# Patient Record
Sex: FEMALE | Race: WHITE | NOT HISPANIC OR LATINO | Employment: FULL TIME | URBAN - METROPOLITAN AREA
[De-identification: names, ages, dates, MRNs, and addresses within clinical notes are randomized per-mention and may not be internally consistent; named-entity substitution may affect disease eponyms.]

---

## 2017-01-26 ENCOUNTER — TELEPHONE (OUTPATIENT)
Dept: UROLOGY | Facility: CLINIC | Age: 37
End: 2017-01-26

## 2017-01-26 NOTE — TELEPHONE ENCOUNTER
Patient states symptoms started approximately two days ago. States she just finished a round of Bactrim DS.    Appointment given for 1.27.17 at 9:30am.

## 2017-01-26 NOTE — TELEPHONE ENCOUNTER
----- Message from Naila Valentino sent at 1/26/2017 11:43 AM CST -----  Contact: Self [phone]  Patient is calling to see if she can see doctor today she is having blood in urine and pain. Please advice

## 2017-01-27 ENCOUNTER — OFFICE VISIT (OUTPATIENT)
Dept: UROLOGY | Facility: CLINIC | Age: 37
End: 2017-01-27
Payer: COMMERCIAL

## 2017-01-27 VITALS
SYSTOLIC BLOOD PRESSURE: 115 MMHG | HEART RATE: 101 BPM | BODY MASS INDEX: 37.56 KG/M2 | DIASTOLIC BLOOD PRESSURE: 74 MMHG | WEIGHT: 220 LBS | TEMPERATURE: 98 F | OXYGEN SATURATION: 99 % | HEIGHT: 64 IN

## 2017-01-27 DIAGNOSIS — R35.0 URINARY FREQUENCY: ICD-10-CM

## 2017-01-27 DIAGNOSIS — N13.1 HYDRONEPHROSIS CONCURRENT WITH AND DUE TO URETERAL STRICTURE: ICD-10-CM

## 2017-01-27 DIAGNOSIS — N30.01 ACUTE CYSTITIS WITH HEMATURIA: ICD-10-CM

## 2017-01-27 DIAGNOSIS — R30.0 DYSURIA: ICD-10-CM

## 2017-01-27 DIAGNOSIS — N30.10 INTERSTITIAL CYSTITIS: ICD-10-CM

## 2017-01-27 DIAGNOSIS — R10.9 FLANK PAIN: Primary | ICD-10-CM

## 2017-01-27 DIAGNOSIS — N13.5 URETERAL STRICTURE, LEFT: ICD-10-CM

## 2017-01-27 LAB
BACTERIA #/AREA URNS AUTO: ABNORMAL /HPF
BILIRUB UR QL STRIP: NEGATIVE
CLARITY UR REFRACT.AUTO: ABNORMAL
COLOR UR AUTO: ABNORMAL
GLUCOSE UR QL STRIP: NEGATIVE
HGB UR QL STRIP: ABNORMAL
HYALINE CASTS UR QL AUTO: 0 /LPF
KETONES UR QL STRIP: NEGATIVE
LEUKOCYTE ESTERASE UR QL STRIP: ABNORMAL
MICROSCOPIC COMMENT: ABNORMAL
NITRITE UR QL STRIP: NEGATIVE
NON-SQ EPI CELLS #/AREA URNS AUTO: 2 /HPF
PH UR STRIP: 7 [PH] (ref 5–8)
PROT UR QL STRIP: ABNORMAL
RBC #/AREA URNS AUTO: >100 /HPF (ref 0–4)
SP GR UR STRIP: 1.01 (ref 1–1.03)
SQUAMOUS #/AREA URNS AUTO: 8 /HPF
URN SPEC COLLECT METH UR: ABNORMAL
UROBILINOGEN UR STRIP-ACNC: NEGATIVE EU/DL
WBC #/AREA URNS AUTO: >100 /HPF (ref 0–5)
WBC CLUMPS UR QL AUTO: ABNORMAL

## 2017-01-27 PROCEDURE — 87086 URINE CULTURE/COLONY COUNT: CPT

## 2017-01-27 PROCEDURE — 99214 OFFICE O/P EST MOD 30 MIN: CPT | Mod: S$GLB,,, | Performed by: UROLOGY

## 2017-01-27 PROCEDURE — 81001 URINALYSIS AUTO W/SCOPE: CPT

## 2017-01-27 PROCEDURE — 1159F MED LIST DOCD IN RCRD: CPT | Mod: S$GLB,,, | Performed by: UROLOGY

## 2017-01-27 PROCEDURE — 99999 PR PBB SHADOW E&M-EST. PATIENT-LVL IV: CPT | Mod: PBBFAC,,, | Performed by: UROLOGY

## 2017-01-27 RX ORDER — OXYCODONE AND ACETAMINOPHEN 10; 325 MG/1; MG/1
1 TABLET ORAL EVERY 4 HOURS PRN
Qty: 50 TABLET | Refills: 0 | Status: SHIPPED | OUTPATIENT
Start: 2017-01-27 | End: 2017-02-17 | Stop reason: SDUPTHER

## 2017-01-27 RX ORDER — SULFAMETHOXAZOLE AND TRIMETHOPRIM 800; 160 MG/1; MG/1
1 TABLET ORAL 2 TIMES DAILY
Qty: 28 TABLET | Refills: 1 | Status: SHIPPED | OUTPATIENT
Start: 2017-01-27 | End: 2017-02-10

## 2017-01-27 NOTE — PROGRESS NOTES
Subjective:       Patient ID: Lesa Leyva is a 36 y.o. female.    Chief Complaint: Hematuria    HPI Comments: 37 yo WF with history of L ureteral stricture at level of pelvic brim from prior kidney stone requiring long term stenting.    Hematuria   This is a new problem. The current episode started 1 to 4 weeks ago. The problem has been gradually improving since onset. She describes the hematuria as gross hematuria. The hematuria occurs during the initial portion of her urinary stream. She reports clotting at the beginning of her urine stream. Her pain is at a severity of 7/10. The pain is moderate. She describes her urine color as dark red. Irritative symptoms include frequency (x10-12), nocturia (x3) and urgency. Associated symptoms include abdominal pain (Bladder pain / Spasm), chills, dysuria, flank pain (L>R), hesitancy and nausea. Pertinent negatives include no facial swelling, fever, genital pain, inability to urinate, vomiting or sore throat. She is sexually active. Her past medical history is significant for kidney stones and UTI. There is no history of hypertension,  trauma, recent infection, sickle cell disease, STDs or tobacco use.   Flank Pain   Associated symptoms include abdominal pain (Bladder pain / Spasm) and dysuria. Pertinent negatives include no chest pain, fever, headaches, pelvic pain or weakness.     Review of Systems   Constitutional: Positive for chills. Negative for activity change, appetite change, fatigue and fever.   HENT: Negative for congestion, ear pain, facial swelling, hearing loss, nosebleeds, sinus pressure, sore throat and trouble swallowing.    Eyes: Negative for pain and visual disturbance.   Respiratory: Negative for apnea, cough and shortness of breath.    Cardiovascular: Negative for chest pain and leg swelling.   Gastrointestinal: Positive for abdominal pain (Bladder pain / Spasm) and nausea. Negative for abdominal distention, anal bleeding, blood in stool,  constipation, diarrhea, rectal pain and vomiting.   Endocrine: Negative for cold intolerance, heat intolerance, polydipsia, polyphagia and polyuria.   Genitourinary: Positive for dysuria, flank pain (L>R), frequency (x10-12), hematuria, hesitancy, nocturia (x3) and urgency. Negative for decreased urine volume, difficulty urinating, dyspareunia, enuresis, genital sores, menstrual problem, pelvic pain, vaginal bleeding, vaginal discharge and vaginal pain.   Musculoskeletal: Negative for arthralgias and back pain.   Skin: Negative for color change, pallor and rash.   Allergic/Immunologic: Negative for environmental allergies, food allergies and immunocompromised state.   Neurological: Negative for dizziness, speech difficulty, weakness and headaches.   Hematological: Negative for adenopathy. Does not bruise/bleed easily.   Psychiatric/Behavioral: Negative.        Objective:      Physical Exam   Nursing note and vitals reviewed.  Constitutional: She is oriented to person, place, and time. She appears well-developed and well-nourished.   HENT:   Head: Normocephalic.   Nose: Nose normal.   Mouth/Throat: Oropharynx is clear and moist.   Eyes: Conjunctivae and EOM are normal. Pupils are equal, round, and reactive to light.   Neck: Normal range of motion. Neck supple.   Cardiovascular: Normal rate, regular rhythm, normal heart sounds and intact distal pulses.    Pulmonary/Chest: Effort normal and breath sounds normal.   Abdominal: Soft. Bowel sounds are normal.   Genitourinary: Vagina normal.   Musculoskeletal: Normal range of motion.   Neurological: She is alert and oriented to person, place, and time. She has normal reflexes.   Skin: Skin is warm and dry.     Psychiatric: She has a normal mood and affect. Her behavior is normal. Judgment and thought content normal.       Assessment:       1. Flank pain    2. Urinary frequency    3. Ureteral stricture, left    4. Interstitial cystitis    5. Acute cystitis with hematuria     6. Hydronephrosis concurrent with and due to ureteral stricture        Plan:       Patient Instructions   Bactrim DS  Twice daily for two weeks  U/A and Urine C+S  F/U 1 month

## 2017-01-27 NOTE — MR AVS SNAPSHOT
[facility]  [address] [place] [address] [address]  [place] [address]  Phone: [phone]  Fax: [phone]                  Lesa Leyva   2017 9:30 AM   Office Visit    Description:  Female : 1980   Provider:  Angel Hernandez MD   Department:  [facility]           Reason for Visit     Hematuria           Diagnoses this Visit        Comments    Flank pain    -  Primary     Urinary frequency         Ureteral stricture, left         Interstitial cystitis         Acute cystitis with hematuria         Hydronephrosis concurrent with and due to ureteral stricture         Dysuria                To Do List           Goals (5 Years of Data)     None      Follow-Up and Disposition     Return in about 4 weeks (around 2017).    Follow-up and Disposition History       These Medications        Disp Refills Start End    sulfamethoxazole-trimethoprim 800-160mg (BACTRIM DS) 800-160 mg Tab 28 tablet 1 2017 2/10/2017    Take 1 tablet by mouth 2 (two) times daily. - Oral    Pharmacy: [facility]/pharmacy #[id number] - [place], [place] - [address] [facility] Ph #: [phone]       oxycodone-acetaminophen (PERCOCET)  mg per tablet 50 tablet 0 2017     Take 1 tablet by mouth every 4 (four) hours as needed for Pain. - Oral    Pharmacy: [facility]/pharmacy #[id number] - [place], [place] - [address] [facility] Ph #: [phone]         [facility] On Call     [facility] On Call Nurse Care Line -  Assistance  Registered nurses in the [facility] provide clinical advisement, health education, appointment booking, and other advisory services.  Call for this free service at [phone].             Medications           Message regarding Medications     Verify the changes and/or additions to your medication regime listed below are the same as discussed with your clinician today.  If any of these changes or additions are incorrect, please notify your healthcare provider.        START taking these NEW  "medications        Refills    sulfamethoxazole-trimethoprim 800-160mg (BACTRIM DS) 800-160 mg Tab 1    Sig: Take 1 tablet by mouth 2 (two) times daily.    Class: Normal    Route: Oral           Verify that the below list of medications is an accurate representation of the medications you are currently taking.  If none reported, the list may be blank. If incorrect, please contact your healthcare provider. Carry this list with you in case of emergency.           Current Medications     amitriptyline (ELAVIL) 10 MG tablet Take 1 tablet by mouth every evening.    CELEBREX 200 mg capsule Take 1 capsule by mouth once daily.    duloxetine (CYMBALTA) 60 MG capsule Take 60 mg by mouth once daily.    hydrocodone-acetaminophen 10-325mg (NORCO)  mg Tab Take 1 tablet by mouth every 6 (six) hours as needed.    LYRICA 200 mg Cap Take 200 mg by mouth once daily.    ondansetron (ZOFRAN-ODT) 4 MG TbDL Take 1 tablet (4 mg total) by mouth every 6 (six) hours as needed (nausea or vomiting).    oxycodone-acetaminophen (PERCOCET)  mg per tablet Take 1 tablet by mouth every 4 (four) hours as needed for Pain.    URIBEL 118-10-40.8-36 mg Cap Take 1 capsule by mouth 4 (four) times daily as needed.    UROGESIC-BLUE 81.6-40.8-0.12 mg Tab Take 1 tablet by mouth every 6 (six) hours as needed.    hyoscyamine (LEVSIN/SL) 0.125 mg Subl Place 1 tablet (0.125 mg total) under the tongue every 6 (six) hours as needed (Bladder Spasm).    sulfamethoxazole-trimethoprim 800-160mg (BACTRIM DS) 800-160 mg Tab Take 1 tablet by mouth 2 (two) times daily.           Clinical Reference Information           Vital Signs - Last Recorded  Most recent update: 1/27/2017  9:55 AM by Lamberto Gonzalez MA    BP Pulse Temp Ht Wt LMP    115/74 101 98.3 °F (36.8 °C) 5' 4" (1.626 m) 99.8 kg (220 lb) 05/08/2015    SpO2 BMI             99% 37.76 kg/m2         Blood Pressure          Most Recent Value    BP  115/74      Allergies as of 1/27/2017     No Known Allergies "      Immunizations Administered on Date of Encounter - 1/27/2017     None      Orders Placed During Today's Visit      Normal Orders This Visit    Urinalysis     Urinalysis     Urine culture     Future Labs/Procedures Expected by Expires    Urine culture  1/27/2017 3/28/2018    Urinalysis  As directed 5/29/2017      [facility] Sign-Up     Activating your [facility] account is as easy as 1-2-3!     1) Visit [email or web address], select Sign Up Now, enter this activation code and your date of birth, then select Next.  [email or web address]  Expires: 3/13/2017 10:47 AM      2) Create a username and password to use when you visit [facility] in the future and select a security question in case you lose your password and select Next.    3) Enter your e-mail address and click Sign Up!    Additional Information  If you have questions, please e-mail [email or web address] or call [phone] to talk to our [facility] staff. Remember, [facility] is NOT to be used for urgent needs. For medical emergencies, dial 911.         Instructions    Bactrim DS  Twice daily for two weeks  U/A and Urine C+S  F/U 1 month

## 2017-01-28 LAB — BACTERIA UR CULT: NORMAL

## 2017-01-30 ENCOUNTER — TELEPHONE (OUTPATIENT)
Dept: UROLOGY | Facility: CLINIC | Age: 37
End: 2017-01-30

## 2017-01-30 NOTE — TELEPHONE ENCOUNTER
----- Message from Angel Hernandez MD sent at 1/30/2017  8:59 AM CST -----  Urine culture is negative, finish medicines.

## 2017-02-17 RX ORDER — OXYCODONE AND ACETAMINOPHEN 10; 325 MG/1; MG/1
1 TABLET ORAL EVERY 4 HOURS PRN
Qty: 50 TABLET | Refills: 0 | Status: SHIPPED | OUTPATIENT
Start: 2017-02-17 | End: 2017-03-17 | Stop reason: SDUPTHER

## 2017-02-17 NOTE — TELEPHONE ENCOUNTER
Patient's mother called requesting a refill for her Percocet. Last filled on 1.27.17, 50 dispensed, with no refills.

## 2017-02-21 ENCOUNTER — OFFICE VISIT (OUTPATIENT)
Dept: UROLOGY | Facility: CLINIC | Age: 37
End: 2017-02-21
Payer: COMMERCIAL

## 2017-02-21 VITALS
HEIGHT: 64 IN | TEMPERATURE: 98 F | WEIGHT: 228.5 LBS | SYSTOLIC BLOOD PRESSURE: 110 MMHG | RESPIRATION RATE: 20 BRPM | BODY MASS INDEX: 39.01 KG/M2 | DIASTOLIC BLOOD PRESSURE: 72 MMHG | HEART RATE: 88 BPM

## 2017-02-21 DIAGNOSIS — R10.9 FLANK PAIN: ICD-10-CM

## 2017-02-21 DIAGNOSIS — R31.9 HEMATURIA: ICD-10-CM

## 2017-02-21 DIAGNOSIS — R10.2 PELVIC PAIN IN FEMALE: ICD-10-CM

## 2017-02-21 DIAGNOSIS — R30.1 PAINFUL BLADDER SPASM: Primary | ICD-10-CM

## 2017-02-21 LAB
BACTERIA #/AREA URNS AUTO: ABNORMAL /HPF
BILIRUB UR QL STRIP: NEGATIVE
CLARITY UR REFRACT.AUTO: ABNORMAL
COLOR UR AUTO: ABNORMAL
GLUCOSE UR QL STRIP: NEGATIVE
HGB UR QL STRIP: ABNORMAL
HYALINE CASTS UR QL AUTO: 0 /LPF
KETONES UR QL STRIP: NEGATIVE
LEUKOCYTE ESTERASE UR QL STRIP: ABNORMAL
MICROSCOPIC COMMENT: ABNORMAL
NITRITE UR QL STRIP: NEGATIVE
PH UR STRIP: 6 [PH] (ref 5–8)
PROT UR QL STRIP: ABNORMAL
RBC #/AREA URNS AUTO: 28 /HPF (ref 0–4)
SP GR UR STRIP: 1.01 (ref 1–1.03)
SQUAMOUS #/AREA URNS AUTO: 1 /HPF
URN SPEC COLLECT METH UR: ABNORMAL
UROBILINOGEN UR STRIP-ACNC: NEGATIVE EU/DL
WBC #/AREA URNS AUTO: >100 /HPF (ref 0–5)
WBC CLUMPS UR QL AUTO: ABNORMAL

## 2017-02-21 PROCEDURE — 1160F RVW MEDS BY RX/DR IN RCRD: CPT | Mod: S$GLB,,, | Performed by: UROLOGY

## 2017-02-21 PROCEDURE — 87086 URINE CULTURE/COLONY COUNT: CPT

## 2017-02-21 PROCEDURE — 81001 URINALYSIS AUTO W/SCOPE: CPT

## 2017-02-21 PROCEDURE — 99999 PR PBB SHADOW E&M-EST. PATIENT-LVL III: CPT | Mod: PBBFAC,,, | Performed by: UROLOGY

## 2017-02-21 PROCEDURE — 99214 OFFICE O/P EST MOD 30 MIN: CPT | Mod: S$GLB,,, | Performed by: UROLOGY

## 2017-02-21 RX ORDER — SULFAMETHOXAZOLE AND TRIMETHOPRIM 800; 160 MG/1; MG/1
1 TABLET ORAL 2 TIMES DAILY
Qty: 28 TABLET | Refills: 1 | Status: SHIPPED | OUTPATIENT
Start: 2017-02-21 | End: 2017-03-21

## 2017-02-21 RX ORDER — HYOSCYAMINE SULFATE 0.12 MG/1
0.12 TABLET SUBLINGUAL EVERY 6 HOURS PRN
Qty: 120 TABLET | Refills: 11 | Status: SHIPPED | OUTPATIENT
Start: 2017-02-21 | End: 2020-01-15

## 2017-02-21 NOTE — MR AVS SNAPSHOT
[facility]  [address] [place] [address] [address]  [place] [address]  Phone: [phone]  Fax: [phone]                  Lesa Leyva   2017 1:00 PM   Office Visit    Description:  Female : 1980   Provider:  Angel Hernandez MD   Department:  [facility]           Reason for Visit     Follow-up           Diagnoses this Visit        Comments    Painful bladder spasm    -  Primary     Pelvic pain in female         Flank pain         Hematuria                To Do List           Goals (5 Years of Data)     None      Follow-Up and Disposition     Return in about 6 weeks (around 2017).    Follow-up and Disposition History       These Medications        Disp Refills Start End    sulfamethoxazole-trimethoprim 800-160mg (BACTRIM DS) 800-160 mg Tab 28 tablet 1 2017 3/21/2017    Take 1 tablet by mouth 2 (two) times daily. - Oral    Pharmacy: [facility]/pharmacy #[id number] - [place] - [address] [facility] Ph #: [phone]       hyoscyamine (LEVSIN/SL) 0.125 mg Subl 120 tablet 11 2017 3/23/2017    Place 1 tablet (0.125 mg total) under the tongue every 6 (six) hours as needed (Bladder Spasm). - Sublingual    Pharmacy: [facility]/pharmacy #[id number] - [place] - [address] [facility] Ph #: [phone]         [facility] On Call     [facility] On Call Nurse Care Line -  Assistance  Registered nurses in the [facility] provide clinical advisement, health education, appointment booking, and other advisory services.  Call for this free service at [phone].             Medications           Message regarding Medications     Verify the changes and/or additions to your medication regime listed below are the same as discussed with your clinician today.  If any of these changes or additions are incorrect, please notify your healthcare provider.        START taking these NEW medications        Refills    sulfamethoxazole-trimethoprim 800-160mg (BACTRIM DS) 800-160 mg Tab 1    Sig:  "Take 1 tablet by mouth 2 (two) times daily.    Class: Normal    Route: Oral      STOP taking these medications     ondansetron (ZOFRAN-ODT) 4 MG TbDL Take 1 tablet (4 mg total) by mouth every 6 (six) hours as needed (nausea or vomiting).    hydrocodone-acetaminophen 10-325mg (NORCO)  mg Tab Take 1 tablet by mouth every 6 (six) hours as needed.           Verify that the below list of medications is an accurate representation of the medications you are currently taking.  If none reported, the list may be blank. If incorrect, please contact your healthcare provider. Carry this list with you in case of emergency.           Current Medications     amitriptyline (ELAVIL) 10 MG tablet Take 1 tablet by mouth every evening.    CELEBREX 200 mg capsule Take 1 capsule by mouth once daily.    duloxetine (CYMBALTA) 60 MG capsule Take 60 mg by mouth once daily.    LYRICA 200 mg Cap Take 200 mg by mouth once daily.    oxycodone-acetaminophen (PERCOCET)  mg per tablet Take 1 tablet by mouth every 4 (four) hours as needed for Pain.    URIBEL 118-10-40.8-36 mg Cap Take 1 capsule by mouth 4 (four) times daily as needed.    hyoscyamine (LEVSIN/SL) 0.125 mg Subl Place 1 tablet (0.125 mg total) under the tongue every 6 (six) hours as needed (Bladder Spasm).    sulfamethoxazole-trimethoprim 800-160mg (BACTRIM DS) 800-160 mg Tab Take 1 tablet by mouth 2 (two) times daily.    UROGESIC-BLUE 81.6-40.8-0.12 mg Tab Take 1 tablet by mouth every 6 (six) hours as needed.           Clinical Reference Information           Your Vitals Were     BP Pulse Temp Resp Height Weight    110/72 88 98.3 °F (36.8 °C) 20 5' 4" (1.626 m) 103.7 kg (228 lb 8.1 oz)    Last Period BMI             05/08/2015 39.22 kg/m2         Blood Pressure          Most Recent Value    BP  110/72      Allergies as of 2/21/2017     No Known Allergies      Immunizations Administered on Date of Encounter - 2/21/2017     None      Orders Placed During Today's Visit      " Normal Orders This Visit    Urinalysis     Urine culture     X-Ray Abdomen AP 1 View     Future Labs/Procedures Expected by Expires    Urine culture  2/21/2017 4/22/2018    X-Ray Abdomen AP 1 View  2/21/2017 2/22/2018    Urinalysis  As directed 6/23/2017      [facility] Sign-Up     Activating your [facility] account is as easy as 1-2-3!     1) Visit [email or web address], select Sign Up Now, enter this activation code and your date of birth, then select Next.  [email or web address]  Expires: 3/13/2017 10:47 AM      2) Create a username and password to use when you visit [facility] in the future and select a security question in case you lose your password and select Next.    3) Enter your e-mail address and click Sign Up!    Additional Information  If you have questions, please e-mail [email or web address] or call [phone] to talk to our [facility] staff. Remember, [facility] is NOT to be used for urgent needs. For medical emergencies, dial 911.         Instructions    U/A, U C+S.  Bactrim DS twice daily for 4 weeks.  F/U 6 wks  Continue Levsin and Uribel.         Language Assistance Services     ATTENTION: Language assistance services are available, free of charge. Please call [phone].      ATENCIÓN: Si habla español, tiene a miramontes disposición servicios gratuitos de asistencia lingüística. Llame al [phone].     CHÚ Ý: N?u b?n nói Ti?ng Vi?t, có các d?ch v? h? tr? ngôn ng? mi?n phí dành cho b?n. G?i s? [phone].         [facility] complies with applicable Federal civil rights laws and does not discriminate on the basis of race, color, national origin, age, disability, or sex.

## 2017-02-21 NOTE — PROGRESS NOTES
Subjective:       Patient ID: Lesa Leyva is a 37 y.o. female.    Chief Complaint: Follow-up (Flank pain)    HPI Comments: 36 yo WF with left ureteral stricture. Now with bladder spasms and bilateral CVAT L>R.  Last U C+S was negative.  Gross hematuria 1-2 x daily.      Flank Pain   This is a new problem. The current episode started 1 to 4 weeks ago. The problem occurs constantly. The problem has been gradually worsening since onset. The pain is present in the costovertebral angle (Bilateral L>R). The quality of the pain is described as stabbing. The pain does not radiate. The pain is at a severity of 6/10. The pain is moderate. The pain is worse during the day. The symptoms are aggravated by urinating, bending, lying down and position. Associated symptoms include dysuria and pelvic pain. Pertinent negatives include no abdominal pain, bladder incontinence, bowel incontinence, chest pain, fever, headaches, leg pain, numbness, paresis, paresthesias, perianal numbness, tingling, weakness or weight loss. Risk factors include lack of exercise and sedentary lifestyle. She has tried analgesics and heat (Anibiotics, warm baths, hydration) for the symptoms. The treatment provided mild relief.   Pelvic Pain   The patient's primary symptoms include pelvic pain. The patient's pertinent negatives include no vaginal discharge. This is a recurrent problem. The current episode started 1 to 4 weeks ago. The problem occurs constantly. The problem has been gradually worsening. The pain is moderate. Affected Side: Central. She is not pregnant. Associated symptoms include back pain, discolored urine, dysuria, flank pain, frequency, hematuria and urgency. Pertinent negatives include no abdominal pain, anorexia, chills, constipation, diarrhea, fever, headaches, joint pain, joint swelling, nausea, painful intercourse, rash, sore throat or vomiting. The symptoms are aggravated by tactile pressure, activity and urinating. She has tried  antibiotics, oral narcotics and warm baths for the symptoms. The treatment provided mild relief. She is sexually active. No, her partner does not have an STD. She uses hysterectomy for contraception. Her past medical history is significant for a gynecological surgery. There is no history of an abdominal surgery, a  section, an ectopic pregnancy, endometriosis, herpes simplex, menorrhagia, metrorrhagia, miscarriage, ovarian cysts, perineal abscess, PID, an STD, a terminated pregnancy or vaginosis.   Urinary Frequency    This is a chronic problem. The current episode started 1 to 4 weeks ago. The problem occurs every urination. The problem has been waxing and waning. The quality of the pain is described as burning. The pain is at a severity of 9/10. The pain is severe. There has been no fever. She is sexually active. There is no history of pyelonephritis. Associated symptoms include flank pain, frequency, hematuria, hesitancy (Anxiety over voiding due to expected discomfort.), sweats and urgency. Pertinent negatives include no behavior changes, chills, discharge, nausea, possible pregnancy, vomiting, weight loss, bubble bath use, constipation, rash or withholding. She has tried antibiotics for the symptoms. The treatment provided moderate relief. Her past medical history is significant for recurrent UTIs and a urological procedure. There is no history of catheterization, diabetes insipidus, diabetes mellitus, genitourinary reflux, hypertension, kidney stones, a single kidney, STD or urinary stasis.     Review of Systems   Constitutional: Negative for activity change, appetite change, chills, fatigue, fever and weight loss.   HENT: Negative for congestion, ear pain, hearing loss, nosebleeds, sinus pressure, sore throat and trouble swallowing.    Eyes: Negative for pain and visual disturbance.   Respiratory: Negative for apnea, cough and shortness of breath.    Cardiovascular: Negative for chest pain and leg  swelling.   Gastrointestinal: Negative for abdominal distention, abdominal pain, anal bleeding, anorexia, blood in stool, bowel incontinence, constipation, diarrhea, nausea, rectal pain and vomiting.   Endocrine: Negative for cold intolerance, heat intolerance, polydipsia, polyphagia and polyuria.   Genitourinary: Positive for dysuria, flank pain, frequency, hematuria, hesitancy (Anxiety over voiding due to expected discomfort.), pelvic pain and urgency. Negative for bladder incontinence, decreased urine volume, difficulty urinating, dyspareunia, enuresis, genital sores, menorrhagia, menstrual problem, vaginal bleeding, vaginal discharge and vaginal pain.   Musculoskeletal: Positive for back pain. Negative for arthralgias and joint pain.   Skin: Negative for color change, pallor and rash.   Allergic/Immunologic: Negative for environmental allergies, food allergies and immunocompromised state.   Neurological: Negative for dizziness, tingling, speech difficulty, weakness, numbness, headaches and paresthesias.   Hematological: Negative for adenopathy. Does not bruise/bleed easily.   Psychiatric/Behavioral: Negative.        Objective:      Physical Exam   Nursing note and vitals reviewed.  Constitutional: She is oriented to person, place, and time. She appears well-developed and well-nourished.   HENT:   Head: Normocephalic.   Nose: Nose normal.   Mouth/Throat: Oropharynx is clear and moist.   Eyes: Conjunctivae and EOM are normal. Pupils are equal, round, and reactive to light.   Neck: Normal range of motion. Neck supple.   Cardiovascular: Normal rate, regular rhythm, normal heart sounds and intact distal pulses.    Pulmonary/Chest: Effort normal and breath sounds normal.   Abdominal: Soft. Bowel sounds are normal.   Genitourinary:   Genitourinary Comments: Bilateral CVAT Left>Right, Supra-pubic,Bladder tenderness.   Musculoskeletal: Normal range of motion.   Neurological: She is alert and oriented to person, place,  and time. She has normal reflexes.   Skin: Skin is warm and dry.     Psychiatric: She has a normal mood and affect. Her behavior is normal. Judgment and thought content normal.       Assessment:       1. Painful bladder spasm    2. Pelvic pain in female    3. Flank pain    4. Hematuria        Plan:       Patient Instructions   U/A, U C+S.  Bactrim DS twice daily for 4 weeks.  F/U 6 wks  Continue Levsin and Uribel.

## 2017-02-23 ENCOUNTER — TELEPHONE (OUTPATIENT)
Dept: UROLOGY | Facility: CLINIC | Age: 37
End: 2017-02-23

## 2017-02-23 LAB — BACTERIA UR CULT: NO GROWTH

## 2017-02-23 NOTE — TELEPHONE ENCOUNTER
----- Message from Naila Valentino sent at 2/23/2017  3:18 PM CST -----  Contact: Self [phone]  Patient is calling to talk to nurse concerning personal questions. Please advice

## 2017-02-23 NOTE — TELEPHONE ENCOUNTER
Dr. Hernandez,    Patient has gone over her alotted 10 day sick leave. She works in [employer]. Needs a letter to state length of care, reason for care, that has had a stent in place for last three years, that has a history of kidney stones and IC.  Patient would like this faxed to [phone]    Thanks,   Karrie

## 2017-02-24 NOTE — TELEPHONE ENCOUNTER
----- Message from Angel Hernandez MD sent at 2/23/2017  1:23 PM CST -----  Notify pt that urine shows possible UTI. Await C+S. Continue antibiotics.

## 2017-03-03 NOTE — TELEPHONE ENCOUNTER
Patient will need to send in a written document requesting what information to be put in her letter to her employer regarding her missed days from work or supply something in witting from her employer.

## 2017-03-09 ENCOUNTER — TELEPHONE (OUTPATIENT)
Dept: UROLOGY | Facility: CLINIC | Age: 37
End: 2017-03-09

## 2017-03-09 NOTE — TELEPHONE ENCOUNTER
----- Message from Alicia Israel sent at 3/9/2017  8:03 AM CST -----  No. [phone]   Did you receive the fax regarding why patient sees Dr. Hernandez yesterday.  What is the status of it.   Please call.

## 2017-03-09 NOTE — LETTER
March 9, 2017      Lesa Leyva   [address] [place] [address] [address] [place] [address]             [place] [facility]  [address] [place] [address] [address]  [place] [address]  Phone: [phone]  Fax: [phone] To [facility]:    Lesa Leyva is a longstanding patient in my Urology clinic. She has a history of Interstitial Cystitis, Kidney stones, Urinary Tract Infections and Kidney infections.     She has had surgical insertion of Ureteral stents for kidney stones. These stents do require frequent exchange.This began in 2009.     She is also treated for a Ureteral stricture. Surgical treatment includes dilation of the ureter.     I am considering other treatments over this summer to relieve some of her symptoms.                 Angel Hernandez MD

## 2017-03-17 ENCOUNTER — TELEPHONE (OUTPATIENT)
Dept: UROLOGY | Facility: CLINIC | Age: 37
End: 2017-03-17

## 2017-03-17 DIAGNOSIS — N13.5 URETERAL STRICTURE, LEFT: ICD-10-CM

## 2017-03-17 DIAGNOSIS — R10.9 FLANK PAIN: ICD-10-CM

## 2017-03-17 DIAGNOSIS — R35.0 URINARY FREQUENCY: ICD-10-CM

## 2017-03-17 DIAGNOSIS — R10.2 PELVIC PAIN IN FEMALE: Primary | ICD-10-CM

## 2017-03-17 RX ORDER — OXYCODONE AND ACETAMINOPHEN 10; 325 MG/1; MG/1
1 TABLET ORAL EVERY 4 HOURS PRN
Qty: 50 TABLET | Refills: 0 | Status: SHIPPED | OUTPATIENT
Start: 2017-03-17 | End: 2017-04-17 | Stop reason: SDUPTHER

## 2017-03-17 RX ORDER — METHENAMINE, SODIUM PHOSPHATE, MONOBASIC, MONOHYDRATE, PHENYL SALICYLATE, METHYLENE BLUE, AND HYOSCYAMINE SULFATE 118; 40.8; 36; 10; .12 MG/1; MG/1; MG/1; MG/1; MG/1
1 CAPSULE ORAL 4 TIMES DAILY PRN
Qty: 40 CAPSULE | Refills: 12 | Status: SHIPPED | OUTPATIENT
Start: 2017-03-17 | End: 2017-04-17 | Stop reason: SDUPTHER

## 2017-03-17 NOTE — TELEPHONE ENCOUNTER
----- Message from Diane Solorio sent at 3/17/2017  8:26 AM CDT -----  Contact: [phone]  Patient is requesting to  an order for urabell and pain meds. Patient will  the rx today

## 2017-04-17 NOTE — TELEPHONE ENCOUNTER
----- Message from Grayson Becker sent at 4/17/2017  8:50 AM CDT -----  Contact: SELF/[phone]  Patient called to advise that she needs a refill on her URIBEL 118-10-40.8-36 mg Cap and  oxycodone-acetaminophen (PERCOCET)  mg per tablet.  Please advise.

## 2017-04-18 RX ORDER — OXYCODONE AND ACETAMINOPHEN 10; 325 MG/1; MG/1
1 TABLET ORAL EVERY 4 HOURS PRN
Qty: 50 TABLET | Refills: 0 | Status: SHIPPED | OUTPATIENT
Start: 2017-04-18 | End: 2017-06-09 | Stop reason: SDUPTHER

## 2017-04-18 RX ORDER — METHENAMINE, SODIUM PHOSPHATE, MONOBASIC, MONOHYDRATE, PHENYL SALICYLATE, METHYLENE BLUE, AND HYOSCYAMINE SULFATE 118; 40.8; 36; 10; .12 MG/1; MG/1; MG/1; MG/1; MG/1
1 CAPSULE ORAL 4 TIMES DAILY PRN
Qty: 40 CAPSULE | Refills: 12 | Status: SHIPPED | OUTPATIENT
Start: 2017-04-18 | End: 2017-08-15 | Stop reason: SDUPTHER

## 2017-04-18 NOTE — TELEPHONE ENCOUNTER
----- Message from Naila Valentino sent at 4/18/2017 10:48 AM CDT -----  Contact: Self [phone]  Patient is calling to talk to nurse concerning her status of her prescription refill. Please advice

## 2017-05-04 ENCOUNTER — OFFICE VISIT (OUTPATIENT)
Dept: FAMILY MEDICINE | Facility: CLINIC | Age: 37
End: 2017-05-04
Payer: COMMERCIAL

## 2017-05-04 VITALS
HEIGHT: 64 IN | DIASTOLIC BLOOD PRESSURE: 74 MMHG | WEIGHT: 230.81 LBS | SYSTOLIC BLOOD PRESSURE: 120 MMHG | TEMPERATURE: 98 F | BODY MASS INDEX: 39.4 KG/M2 | HEART RATE: 106 BPM | OXYGEN SATURATION: 99 %

## 2017-05-04 DIAGNOSIS — I49.3 PVCS (PREMATURE VENTRICULAR CONTRACTIONS): ICD-10-CM

## 2017-05-04 DIAGNOSIS — F41.9 ANXIOUSNESS: ICD-10-CM

## 2017-05-04 DIAGNOSIS — R51.9 HEADACHE, UNSPECIFIED HEADACHE TYPE: ICD-10-CM

## 2017-05-04 DIAGNOSIS — R31.9 URINARY TRACT INFECTION WITH HEMATURIA, SITE UNSPECIFIED: ICD-10-CM

## 2017-05-04 DIAGNOSIS — R42 DIZZINESS: Primary | ICD-10-CM

## 2017-05-04 DIAGNOSIS — N39.0 URINARY TRACT INFECTION WITH HEMATURIA, SITE UNSPECIFIED: ICD-10-CM

## 2017-05-04 DIAGNOSIS — R53.83 FATIGUE, UNSPECIFIED TYPE: ICD-10-CM

## 2017-05-04 DIAGNOSIS — R00.0 TACHYCARDIA: ICD-10-CM

## 2017-05-04 LAB
BILIRUB SERPL-MCNC: NEGATIVE MG/DL
BLOOD URINE, POC: ABNORMAL
COLOR, POC UA: ABNORMAL
GLUCOSE UR QL STRIP: NEGATIVE
KETONES UR QL STRIP: NEGATIVE
LEUKOCYTE ESTERASE URINE, POC: ABNORMAL
NITRITE, POC UA: POSITIVE
PH, POC UA: 8
PROTEIN, POC: 100
SPECIFIC GRAVITY, POC UA: 1.02
UROBILINOGEN, POC UA: 0.2

## 2017-05-04 PROCEDURE — 99214 OFFICE O/P EST MOD 30 MIN: CPT | Mod: 25,S$GLB,, | Performed by: NURSE PRACTITIONER

## 2017-05-04 PROCEDURE — 93005 ELECTROCARDIOGRAM TRACING: CPT | Mod: S$GLB,,, | Performed by: NURSE PRACTITIONER

## 2017-05-04 PROCEDURE — 87086 URINE CULTURE/COLONY COUNT: CPT

## 2017-05-04 PROCEDURE — 81001 URINALYSIS AUTO W/SCOPE: CPT | Mod: S$GLB,,, | Performed by: NURSE PRACTITIONER

## 2017-05-04 PROCEDURE — 93010 ELECTROCARDIOGRAM REPORT: CPT | Mod: S$GLB,,, | Performed by: INTERNAL MEDICINE

## 2017-05-04 PROCEDURE — 1160F RVW MEDS BY RX/DR IN RCRD: CPT | Mod: S$GLB,,, | Performed by: NURSE PRACTITIONER

## 2017-05-04 RX ORDER — SULFAMETHOXAZOLE AND TRIMETHOPRIM 800; 160 MG/1; MG/1
1 TABLET ORAL 2 TIMES DAILY
Qty: 20 TABLET | Refills: 0 | Status: SHIPPED | OUTPATIENT
Start: 2017-05-04 | End: 2017-06-09 | Stop reason: ALTCHOICE

## 2017-05-04 NOTE — MR AVS SNAPSHOT
[facility]  [address]  [place] [address]  Phone: [phone]  Fax: [phone]                  Lesa Leyva   2017 3:00 PM   Office Visit    Description:  Female : 1980   Provider:  Miranda New NP   Department:  [facility]           Reason for Visit     Dizziness           Diagnoses this Visit        Comments    Dizziness    -  Primary     Headache, unspecified headache type         Fatigue, unspecified type         Tachycardia         Anxiousness         Urinary tract infection with hematuria, site unspecified         PVCs (premature ventricular contractions)                To Do List           Future Appointments        Provider Department Dept Phone    2017 3:30 PM Angel Hernandez MD [facility] [phone]      Goals (5 Years of Data)     None      Follow-Up and Disposition     Return in about 10 days (around 2017) for recheck.       These Medications        Disp Refills Start End    sulfamethoxazole-trimethoprim 800-160mg (BACTRIM DS) 800-160 mg Tab 20 tablet 0 2017     Take 1 tablet by mouth 2 (two) times daily. - Oral    Pharmacy: [facility]/pharmacy #[id number] - [place] - [address] [facility] #: [phone]         [facility]     [facility] -  Assistance  Unless otherwise directed by your provider, please contact [facility], our nurse care line that is available for  assistance.     Registered nurses in the [facility] provide: appointment scheduling, clinical advisement, health education, and other advisory services.  Call: [phone] (toll free)               Medications           Message regarding Medications     Verify the changes and/or additions to your medication regime listed below are the same as discussed with your clinician today.  If any of these changes or additions are incorrect, please notify your healthcare provider.        START taking these NEW  "medications        Refills    sulfamethoxazole-trimethoprim 800-160mg (BACTRIM DS) 800-160 mg Tab 0    Sig: Take 1 tablet by mouth 2 (two) times daily.    Class: Normal    Route: Oral      STOP taking these medications     UROGESIC-BLUE 81.6-40.8-0.12 mg Tab Take 1 tablet by mouth every 6 (six) hours as needed.           Verify that the below list of medications is an accurate representation of the medications you are currently taking.  If none reported, the list may be blank. If incorrect, please contact your healthcare provider. Carry this list with you in case of emergency.           Current Medications     amitriptyline (ELAVIL) 10 MG tablet Take 1 tablet by mouth every evening.    CELEBREX 200 mg capsule Take 1 capsule by mouth once daily.    duloxetine (CYMBALTA) 60 MG capsule Take 60 mg by mouth once daily.    hyoscyamine (LEVSIN/SL) 0.125 mg Subl Place 1 tablet (0.125 mg total) under the tongue every 6 (six) hours as needed (Bladder Spasm).    LYRICA 200 mg Cap Take 200 mg by mouth once daily.    oxycodone-acetaminophen (PERCOCET)  mg per tablet Take 1 tablet by mouth every 4 (four) hours as needed for Pain.    URIBEL 118-10-40.8-36 mg Cap Take 1 capsule by mouth 4 (four) times daily as needed.    sulfamethoxazole-trimethoprim 800-160mg (BACTRIM DS) 800-160 mg Tab Take 1 tablet by mouth 2 (two) times daily.           Clinical Reference Information           Your Vitals Were     BP Pulse Temp Height Weight Last Period    120/74 (BP Location: Left arm, Patient Position: Sitting, BP Method: Manual) 106 98.1 °F (36.7 °C) (Oral) 5' 4" (1.626 m) 104.7 kg (230 lb 13.2 oz) 05/08/2015    SpO2 BMI             99% 39.62 kg/m2         Blood Pressure          Most Recent Value    BP  120/74      Allergies as of 5/4/2017     No Known Allergies      Immunizations Administered on Date of Encounter - 5/4/2017     None      Orders Placed During Today's Visit      Normal Orders This Visit    IN OFFICE EKG 12-LEAD (to " Muse)     POCT urinalysis, dipstick or tablet reag     Urine culture     Future Labs/Procedures Expected by Expires    CBC auto differential  5/4/2017 7/3/2018    Comprehensive metabolic panel  5/4/2017 7/3/2018    TSH  5/4/2017 7/3/2018         5/4/2017  4:10 PM - Hoda Becker MA      Component Results     Component    Color, UA    DK YELLOW    Spec Grav UA    1.020    pH, UA    8.0    WBC, UA    LARGE    Nitrite, UA    POSITIVE    Protein    100    Glucose, UA    NEGATIVE    Ketones, UA    NEGATIVE    Urobilinogen, UA    0.2    Bilirubin    NEGATIVE    Blood, UA    LARGE            [facility] Sign-Up     Activating your [facility] account is as easy as 1-2-3!     1) Visit [email or web address], select Sign Up Now, enter this activation code and your date of birth, then select Next.  [email or web address]  Expires: 6/18/2017  4:49 PM      2) Create a username and password to use when you visit [facility] in the future and select a security question in case you lose your password and select Next.    3) Enter your e-mail address and click Sign Up!    Additional Information  If you have questions, please e-mail [email or web address] or call [phone] to talk to our [facility] staff. Remember, [facility] is NOT to be used for urgent needs. For medical emergencies, dial 911.         Instructions      Premature Ventricular Contractions  Premature ventricular contractions (PVCs) are a type of arrhythmia (irregular heartbeat). They are common and can affect people of all ages. PVCs are almost never dangerous. But if other heart problems are present, PVCs can cause serious health issues. This sheet tells you more about PVCs and how they are treated.  Understanding Your Hearts Electrical System     During a normal heartbeat, electrical signals start at the SA node and are sent through the walls of the hearts chambers.   To understand how PVCs occur, it helps to first understand how your heart works. Your heart is a muscle that  pumps blood throughout the body. It is made up of 4 chambers: 2 atria and 2 ventricles. Electrical signals are sent to these chambers, making them contract (squeeze) in a certain order. This rhythm, which pumps blood through and out of your heart, is your heartbeat. The process begins in the sinoatrial (SA) or sinus node. This is the heart's natural pacemaker:  · The SA node. This group of cells in the right atrium sends a signal to both atria, telling them to contract. When the atria contract, blood is pumped into the ventricles.   · The AV node. This is another group of cells in the right atrium. It receives the signal from the SA node after it passes through the atria. The AV node transmits the electrical signal from the atria to the ventricles.   What Happens During a PVC?  During a PVC, an abnormal signal disrupts the normal heartbeat. This signal comes from the ventricle instead of the SA node. The signal causes the ventricles to contract too soon, and the heart skips the next normal beat. This results in an irregular heartbeat.  What Are the Symptoms of PVCs?  Sometimes PVCs cause no symptoms at all. Other times, a patient may feel palpitations (irregular heartbeats). These can feel like skipped beats, or flopping in the chest. If PVCs are frequent, other symptoms can occur. These include tiredness, feeling faint, or shortness of breath. They also include fullness or pressure in the neck, and chest pain. These symptoms occur because less oxygen is delivered to the body. This is because PVCs make the heart pump blood less effectively.  What Causes PVCs?  In some cases, no cause of PVCs is found. When a cause is found, it is either chemical or structural:  · Chemical. Changes in the bodys chemistry can prompt PVCs. For instance, raised levels of certain hormones, such as adrenaline or thyroid, can cause PVCs. Consuming substances such as alcohol and caffeine can also cause them.  · Structural. This  involves existing problems in the heart and/or cardiovascular system. Coronary artery disease (CAD) is 1 type of problem that can be related to PVCs. Others are heart failure and heart valve problems.   How Are PVCs Diagnosed?  The doctor will take your medical history and ask you to describe your symptoms. Youll also have a physical exam. And certain tests may be done. These include:  · Electrocardiogram (ECG or EKG). This test records the electrical activity of your heart. During an ECG, small pads (electrodes) are placed on your chest, arms, and legs. Wires connect the pads to a machine, which records your hearts electrical signals.  · Heart monitor. There are 2 types of external heart monitors:  ¨ Holter monitor. This monitor can be worn for 1 to 7 days. It provides a constant recording of heart activity. After the test is done, your health care provider analyzes the recording.  ¨ Event monitors. These monitors can be used for 3 to 4 weeks. One kind is a memory loop recorder. This monitor records constantly, but stores the recording only when you press a button. The other kind is a credit card-sized recorder. This monitor is turned on only during an episode. With both types, you send the recordings of symptoms to your health care provider over the phone.  How Are PVCs Treated?  Treatment depends on whether structural heart problems are present. It is also determined by the severity of symptoms:  · If you have no other heart problems and your symptoms are not bothersome, treatment may not be needed. If it is needed, treatment can involve:  ¨ Lifestyle changes. Your doctor may suggest that you exercise and limit caffeine and alcohol. If you smoke, youll be advised to quit.  ¨ Medications. Two types of medication can help with PVCs. Beta-blockers and calcium channel blockers both can lower the heart rate and reduce blood pressure.  · If you have structural heart problems, youll likely be referred to a doctor  who specializes in heart rhythm problems. This may be a cardiologist or an electrophysiologist. An electrophysiologist is a cardiologist who specializes in the electrical system of the heart. You will need a referral because for you, PVCs can be a bigger threat to your health. Depending on the nature of your heart disease, you may need treatment for an underlying heart problem. In severe cases, an ICD (implantable cardioverter defibrillator) may be implanted. This is done to treat the underlying heart disease. It can help normalize the heart rhythm.  Date Last Reviewed: 3/7/2014  © 7569-5841 [employer]. [address] [place] [address], [place] [address]. All rights reserved. This information is not intended as a substitute for professional medical care. Always follow your healthcare professional's instructions.      Understanding Premature Ventricular Contractions (PVCs)  Premature ventricular contractions (PVCs) are a type of abnormal heartbeat (arrhythmia). They are very common. They can occur in people of all ages from time to time. They usually cause only mild symptoms.  How PVCs happen    Your heart has 4 chambers: 2 upper atria and 2 lower ventricles. Normally, a special group of cells begins the signal to start your heartbeat. These cells are in the sinoatrial (SA) node in the right atrium. The signal quickly travels down your hearts conducting system. It travels to the left and right ventricle. As it travels, the signal triggers nearby parts of your heart to contract. This allows your heart to squeeze in a coordinated way.  During a premature ventricular contraction, the signal to start your heartbeat instead comes from one of the ventricles. This signal is premature, meaning it happens before the SA node has had a chance to fire. The signal spreads through the rest of your heart, causing a heartbeat. If this happens very soon after the previous heartbeat, your heart will push out very little blood.  This causes a feeling of a pause between beats. If it happens a little later, your heart pushes out an almost normal amount of blood. This leads to a feeling of an extra heartbeat. So, the heart has a premature heartbeat in between normal heartbeats.  What causes PVCs?  Certain things can help set off a premature signal in the ventricles. These include:  · Reduced blood flow to your heart  · Scarring after a heart attack (myocardial infarction)  · Electrolyte problems, such as low sodium or potassium levels  · Increased adrenaline, such as with anxiety  · Certain medicines, like digoxin  Many heart conditions raise the risk for PVCs. These include:  · Mitral valve prolapse  · High blood pressure  · Heart attack  · Coronary heart disease  · cardiomyopathy  · Hypertrophic cardiomyopathy  · Congenital heart disease  They often happen in people without any heart disease. However, PVCs are somewhat more common in people with some kind of heart disease.  Symptoms of PVCs  Most people with occasional PVCs dont have symptoms. You are also more likely to have symptoms if you have PVCs often. When symptoms do happen, they are usually minor. Symptoms may include:  · An awareness of the heart beating  · A fluttering or flip-flop feeling in your chest  · Feeling of a skipped or extra heartbeat  · Dizziness and near-fainting  · A pulsing sensation in the neck  PVCs may cause more severe symptoms if you have another heart problem, such as heart failure.  Diagnosing PVCs  Your healthcare provider will ask about your health history and give you a physical exam. An electrocardiogram (ECG) is the main test for diagnosis. This test allows your provider to look at the signal of your heartbeat for a brief time. Any PVCs that occur during this time will show up on the ECG. In some cases, your healthcare provider might advise ECG monitoring over a day or more, up to 30 days. This can help to catch PVCs that dont happen often. This  is done with a monitor you wear night and day for the test period.  These may be the only tests your healthcare provider will need. You may need more testing if you have PVCs often, or many in a row. Your provider may look at other causes, including possible heart problems. These tests might include:  · Echocardiography, to look at your hearts structure and function  · Cardiac stress testing, to see how your heart responds to exercise and to evaluate blood flow through your heart  · Blood tests, to check potassium and thyroid levels  Date Last Reviewed: 2/17/2015  © 3624-8021 [employer]. [address] [place] [address]. All rights reserved. This information is not intended as a substitute for professional medical care. Always follow your healthcare professional's instructions.        Treatment for Premature Ventricular Contractions (PVCs)  Premature ventricular contractions (PVCs) are a type of abnormal heartbeat (arrhythmia). They are very common. They can occur in people of all ages from time to time. They usually cause only mild symptoms.  Types of treatment   Most people with PVCs dont need any treatment. If you are treated for another problem with your heart, your PVCs may decrease. For example, you might take a medicine to lower your blood pressure. This may lower your rate of PVCs.  In some cases, specific treatment may be done to help prevent PVCs. These are used only if you have symptoms from PVCs. Choices include:  · Medicines called beta-blockers  · Other medicines to help prevent arrhythmias  · Catheter ablation, a procedure to destroy the cells in the heart causing the abnormal beats  Living with PVCs  Your healthcare provider may give your more instructions about how to manage your PVCs, such as:  · Eating a heart-healthy diet  · Getting enough exercise  · Maintaining a healthy weight  · Not consuming too much alcohol or caffeine, which can trigger PVCs  · Avoiding too much  stress and fatigue, which can also trigger PVCs  · Getting treatment for your other health conditions, such as high blood pressure  · Making sure to keep all your medical appointments  When to call your healthcare provider  Call your healthcare provider right away if you have any of these:  · Symptoms that get worse over time  · Severe symptoms such as chest pain, near-fainting, or sudden shortness of breath   Date Last Reviewed: 8/10/2015  © 4535-9347 [employer]. [address] [place] [address], [place] [address]. All rights reserved. This information is not intended as a substitute for professional medical care. Always follow your healthcare professional's instructions.               Language Assistance Services     ATTENTION: Language assistance services are available, free of charge. Please call [phone].      ATENCIÓN: Si lissa blankenship, tiene a miramontes disposición servicios gratuitos de asistencia lingüística. Llame al [phone].     CHÚ Ý: N?u b?n nói Ti?ng Vi?t, có các d?ch v? h? tr? ngôn ng? mi?n phí dành cho b?n. G?i s? [phone].         [facility] complies with applicable Federal civil rights laws and does not discriminate on the basis of race, color, national origin, age, disability, or sex.

## 2017-05-04 NOTE — PATIENT INSTRUCTIONS
Premature Ventricular Contractions  Premature ventricular contractions (PVCs) are a type of arrhythmia (irregular heartbeat). They are common and can affect people of all ages. PVCs are almost never dangerous. But if other heart problems are present, PVCs can cause serious health issues. This sheet tells you more about PVCs and how they are treated.  Understanding Your Hearts Electrical System     During a normal heartbeat, electrical signals start at the SA node and are sent through the walls of the hearts chambers.   To understand how PVCs occur, it helps to first understand how your heart works. Your heart is a muscle that pumps blood throughout the body. It is made up of 4 chambers: 2 atria and 2 ventricles. Electrical signals are sent to these chambers, making them contract (squeeze) in a certain order. This rhythm, which pumps blood through and out of your heart, is your heartbeat. The process begins in the sinoatrial (SA) or sinus node. This is the heart's natural pacemaker:  · The SA node. This group of cells in the right atrium sends a signal to both atria, telling them to contract. When the atria contract, blood is pumped into the ventricles.   · The AV node. This is another group of cells in the right atrium. It receives the signal from the SA node after it passes through the atria. The AV node transmits the electrical signal from the atria to the ventricles.   What Happens During a PVC?  During a PVC, an abnormal signal disrupts the normal heartbeat. This signal comes from the ventricle instead of the SA node. The signal causes the ventricles to contract too soon, and the heart skips the next normal beat. This results in an irregular heartbeat.  What Are the Symptoms of PVCs?  Sometimes PVCs cause no symptoms at all. Other times, a patient may feel palpitations (irregular heartbeats). These can feel like skipped beats, or flopping in the chest. If PVCs are frequent, other symptoms can occur.  These include tiredness, feeling faint, or shortness of breath. They also include fullness or pressure in the neck, and chest pain. These symptoms occur because less oxygen is delivered to the body. This is because PVCs make the heart pump blood less effectively.  What Causes PVCs?  In some cases, no cause of PVCs is found. When a cause is found, it is either chemical or structural:  · Chemical. Changes in the bodys chemistry can prompt PVCs. For instance, raised levels of certain hormones, such as adrenaline or thyroid, can cause PVCs. Consuming substances such as alcohol and caffeine can also cause them.  · Structural. This involves existing problems in the heart and/or cardiovascular system. Coronary artery disease (CAD) is 1 type of problem that can be related to PVCs. Others are heart failure and heart valve problems.   How Are PVCs Diagnosed?  The doctor will take your medical history and ask you to describe your symptoms. Youll also have a physical exam. And certain tests may be done. These include:  · Electrocardiogram (ECG or EKG). This test records the electrical activity of your heart. During an ECG, small pads (electrodes) are placed on your chest, arms, and legs. Wires connect the pads to a machine, which records your hearts electrical signals.  · Heart monitor. There are 2 types of external heart monitors:  ¨ Holter monitor. This monitor can be worn for 1 to 7 days. It provides a constant recording of heart activity. After the test is done, your health care provider analyzes the recording.  ¨ Event monitors. These monitors can be used for 3 to 4 weeks. One kind is a memory loop recorder. This monitor records constantly, but stores the recording only when you press a button. The other kind is a credit card-sized recorder. This monitor is turned on only during an episode. With both types, you send the recordings of symptoms to your health care provider over the phone.  How Are PVCs Treated?  Treatment  depends on whether structural heart problems are present. It is also determined by the severity of symptoms:  · If you have no other heart problems and your symptoms are not bothersome, treatment may not be needed. If it is needed, treatment can involve:  ¨ Lifestyle changes. Your doctor may suggest that you exercise and limit caffeine and alcohol. If you smoke, youll be advised to quit.  ¨ Medications. Two types of medication can help with PVCs. Beta-blockers and calcium channel blockers both can lower the heart rate and reduce blood pressure.  · If you have structural heart problems, youll likely be referred to a doctor who specializes in heart rhythm problems. This may be a cardiologist or an electrophysiologist. An electrophysiologist is a cardiologist who specializes in the electrical system of the heart. You will need a referral because for you, PVCs can be a bigger threat to your health. Depending on the nature of your heart disease, you may need treatment for an underlying heart problem. In severe cases, an ICD (implantable cardioverter defibrillator) may be implanted. This is done to treat the underlying heart disease. It can help normalize the heart rhythm.  Date Last Reviewed: 3/7/2014  © 0076-0224 [employer]. [address] [place] [address]. All rights reserved. This information is not intended as a substitute for professional medical care. Always follow your healthcare professional's instructions.      Understanding Premature Ventricular Contractions (PVCs)  Premature ventricular contractions (PVCs) are a type of abnormal heartbeat (arrhythmia). They are very common. They can occur in people of all ages from time to time. They usually cause only mild symptoms.  How PVCs happen    Your heart has 4 chambers: 2 upper atria and 2 lower ventricles. Normally, a special group of cells begins the signal to start your heartbeat. These cells are in the sinoatrial (SA) node in the right  atrium. The signal quickly travels down your hearts conducting system. It travels to the left and right ventricle. As it travels, the signal triggers nearby parts of your heart to contract. This allows your heart to squeeze in a coordinated way.  During a premature ventricular contraction, the signal to start your heartbeat instead comes from one of the ventricles. This signal is premature, meaning it happens before the SA node has had a chance to fire. The signal spreads through the rest of your heart, causing a heartbeat. If this happens very soon after the previous heartbeat, your heart will push out very little blood. This causes a feeling of a pause between beats. If it happens a little later, your heart pushes out an almost normal amount of blood. This leads to a feeling of an extra heartbeat. So, the heart has a premature heartbeat in between normal heartbeats.  What causes PVCs?  Certain things can help set off a premature signal in the ventricles. These include:  · Reduced blood flow to your heart  · Scarring after a heart attack (myocardial infarction)  · Electrolyte problems, such as low sodium or potassium levels  · Increased adrenaline, such as with anxiety  · Certain medicines, like digoxin  Many heart conditions raise the risk for PVCs. These include:  · Mitral valve prolapse  · High blood pressure  · Heart attack  · Coronary heart disease  · cardiomyopathy  · Hypertrophic cardiomyopathy  · Congenital heart disease  They often happen in people without any heart disease. However, PVCs are somewhat more common in people with some kind of heart disease.  Symptoms of PVCs  Most people with occasional PVCs dont have symptoms. You are also more likely to have symptoms if you have PVCs often. When symptoms do happen, they are usually minor. Symptoms may include:  · An awareness of the heart beating  · A fluttering or flip-flop feeling in your chest  · Feeling of a skipped or extra  heartbeat  · Dizziness and near-fainting  · A pulsing sensation in the neck  PVCs may cause more severe symptoms if you have another heart problem, such as heart failure.  Diagnosing PVCs  Your healthcare provider will ask about your health history and give you a physical exam. An electrocardiogram (ECG) is the main test for diagnosis. This test allows your provider to look at the signal of your heartbeat for a brief time. Any PVCs that occur during this time will show up on the ECG. In some cases, your healthcare provider might advise ECG monitoring over a day or more, up to 30 days. This can help to catch PVCs that dont happen often. This is done with a monitor you wear night and day for the test period.  These may be the only tests your healthcare provider will need. You may need more testing if you have PVCs often, or many in a row. Your provider may look at other causes, including possible heart problems. These tests might include:  · Echocardiography, to look at your hearts structure and function  · Cardiac stress testing, to see how your heart responds to exercise and to evaluate blood flow through your heart  · Blood tests, to check potassium and thyroid levels  Date Last Reviewed: 2/17/2015  © 0341-4919 [employer]. [address] [place] [address], [place] [address]. All rights reserved. This information is not intended as a substitute for professional medical care. Always follow your healthcare professional's instructions.        Treatment for Premature Ventricular Contractions (PVCs)  Premature ventricular contractions (PVCs) are a type of abnormal heartbeat (arrhythmia). They are very common. They can occur in people of all ages from time to time. They usually cause only mild symptoms.  Types of treatment   Most people with PVCs dont need any treatment. If you are treated for another problem with your heart, your PVCs may decrease. For example, you might take a medicine to lower your blood  pressure. This may lower your rate of PVCs.  In some cases, specific treatment may be done to help prevent PVCs. These are used only if you have symptoms from PVCs. Choices include:  · Medicines called beta-blockers  · Other medicines to help prevent arrhythmias  · Catheter ablation, a procedure to destroy the cells in the heart causing the abnormal beats  Living with PVCs  Your healthcare provider may give your more instructions about how to manage your PVCs, such as:  · Eating a heart-healthy diet  · Getting enough exercise  · Maintaining a healthy weight  · Not consuming too much alcohol or caffeine, which can trigger PVCs  · Avoiding too much stress and fatigue, which can also trigger PVCs  · Getting treatment for your other health conditions, such as high blood pressure  · Making sure to keep all your medical appointments  When to call your healthcare provider  Call your healthcare provider right away if you have any of these:  · Symptoms that get worse over time  · Severe symptoms such as chest pain, near-fainting, or sudden shortness of breath   Date Last Reviewed: 8/10/2015  © 8213-8966 [employer]. [address] [place] [address], [place] [address]. All rights reserved. This information is not intended as a substitute for professional medical care. Always follow your healthcare professional's instructions.

## 2017-05-04 NOTE — PROGRESS NOTES
"Subjective:       Patient ID: Lesa Leyva is a 37 y.o. female.    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  After 10 minutes, the symptoms did not resolve so she went see the school nurse.  She reports her blood pressure was 140/100 and she just doesn't feel right.  She has no history of Hypertension and her blood pressure is stable now.  /74 (BP Location: Left arm, Patient Position: Sitting, BP Method: Manual)  Pulse 106  Temp 98.1 °F (36.7 °C) (Oral)   Ht 5' 4" (1.626 m)  Wt 104.7 kg (230 lb 13.2 oz)  LMP 05/08/2015  SpO2 99%  BMI 39.62 kg/m2    She reports the dizziness lasted around 20 minutes and then resolved but then had a throbbing headache 7/10 to frontal region that lasted an hour and has now resolved down to a dull ache 3/10.  Took Vanquish around 11AM.      She reports she did eat around 6:30 AM - a blueberry muffin.  She denies any other symptoms.  No fever, no sore throat, no URI s/s, no urinary symptoms.  Patient does have a history of frequent UTIs and interstitial cystitic but denies urinary symptoms.  She is mildly tachycardic 104 during exam.  She has not had any dizziness or black spots since this morning.      When patient asks what she thinks this may be from, patient asked me if this could be caused by stress.  Reports under a lot of stress due to being around end of school year.  Reports feeling anxious.          Previous Medications    AMITRIPTYLINE (ELAVIL) 10 MG TABLET    Take 1 tablet by mouth every evening.    CELEBREX 200 MG CAPSULE "    Take 1 capsule by mouth once daily.    DULOXETINE (CYMBALTA) 60 MG CAPSULE    Take 60 mg by mouth once daily.    HYOSCYAMINE (LEVSIN/SL) 0.125 MG SUBL    Place 1 tablet (0.125 mg total) under the tongue every 6 (six) hours as needed (Bladder Spasm).    LYRICA 200 MG CAP    Take 200 mg by mouth once daily.    OXYCODONE-ACETAMINOPHEN (PERCOCET)  MG PER TABLET    Take 1 tablet by mouth every 4 (four) hours as needed for Pain.    URIBEL 118-10-40.8-36 MG CAP    Take 1 capsule by mouth 4 (four) times daily as needed.       Past Medical History:   Diagnosis Date    Anxiety     Cystitis     Depression     Neuropathy of both feet     Neuropathy of hand     Personal history of kidney stones 4/2012    has had several kidney stones and has bladder spasms       Past Surgical History:   Procedure Laterality Date    BLADDER SUSPENSION  2013    bladder lifts    CYSTOSCOPY W/ URETERAL STENT PLACEMENT Left 3/2015    HYSTERECTOMY      Partial - still has ovaries    KNEE SURGERY Left     lithotripsy  4/2012    OVARIAN CYST SURGERY  2006       Family History   Problem Relation Age of Onset    No Known Problems Father     Hypertension Mother     Seizures Mother     Fibromyalgia Mother     No Known Problems Sister     Seizures Brother     Seizures Brother     No Known Problems Sister     No Known Problems Sister     Cancer Neg Hx     Heart disease Neg Hx        Social History     Social History    Marital status:      Spouse name: N/A    Number of children: N/A    Years of education: N/A     Occupational History    teacher [employer]     Social History Main Topics    Smoking status: Never Smoker    Smokeless tobacco: None    Alcohol use No    Drug use: No    Sexual activity: Yes     Partners: Male     Other Topics Concern    None     Social History Narrative    Lives with her  and 3 boys in [place]. 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   "      Review of Systems   Constitutional: Negative for appetite change, chills, fatigue, fever and unexpected weight change.   HENT: Negative for congestion, ear pain, mouth sores, nosebleeds, postnasal drip, rhinorrhea, sinus pressure, sneezing, sore throat, trouble swallowing and voice change.    Eyes: Negative for photophobia, pain, discharge, redness, itching and visual disturbance.   Respiratory: Negative for cough, chest tightness and shortness of breath.    Cardiovascular: Negative for chest pain, palpitations and leg swelling.   Gastrointestinal: Negative for abdominal pain, blood in stool, constipation, diarrhea, nausea and vomiting.   Genitourinary: Negative for dysuria, frequency, hematuria and urgency.   Musculoskeletal: Negative for arthralgias, back pain, joint swelling and myalgias.   Skin: Negative for color change and rash.   Allergic/Immunologic: Negative for immunocompromised state.   Neurological: Positive for dizziness, light-headedness and headaches. Negative for seizures, syncope and weakness.   Hematological: Negative for adenopathy. Does not bruise/bleed easily.   Psychiatric/Behavioral: Negative for agitation, dysphoric mood, sleep disturbance and suicidal ideas. The patient is nervous/anxious.          Objective:     Vitals:    05/04/17 1513   BP: 120/74   BP Location: Left arm   Patient Position: Sitting   BP Method: Manual   Pulse: 106   Temp: 98.1 °F (36.7 °C)   TempSrc: Oral   SpO2: 99%   Weight: 104.7 kg (230 lb 13.2 oz)   Height: 5' 4" (1.626 m)          Physical Exam   Constitutional: She is oriented to person, place, and time. She appears well-developed. No distress.   + obesity with Body mass index is 39.62 kg/(m^2).     HENT:   Head: Normocephalic and atraumatic.   Right Ear: External ear normal.   Left Ear: External ear normal.   Nose: Nose normal.   Mouth/Throat: Oropharynx is clear and moist. No oropharyngeal exudate.   Negative Rajani Hallpike   Eyes: EOM are normal. Pupils are " equal, round, and reactive to light.   Neck: Normal range of motion. Neck supple. No tracheal deviation present. No thyromegaly present.   Cardiovascular: Regular rhythm and normal heart sounds.  Tachycardia present.    No murmur heard.  Intermittent premature beat   Pulmonary/Chest: Effort normal and breath sounds normal. No respiratory distress.   Abdominal: Soft. She exhibits no distension.   Musculoskeletal: Normal range of motion. She exhibits no edema.   Lymphadenopathy:     She has no cervical adenopathy.   Neurological: She is alert and oriented to person, place, and time. She has normal strength. She is not disoriented. No cranial nerve deficit or sensory deficit. She displays a negative Romberg sign. Coordination and gait normal.   Skin: Skin is warm and dry. No rash noted. She is not diaphoretic.   Psychiatric: She has a normal mood and affect.       Office Visit on 05/04/2017   Component Date Value Ref Range Status    Color, UA 05/04/2017 DK YELLOW   Final    Spec Grav UA 05/04/2017 1.020   Final    pH, UA 05/04/2017 8.0   Final    WBC, UA 05/04/2017 LARGE   Final    Nitrite, UA 05/04/2017 POSITIVE   Final    Protein 05/04/2017 100   Final    Glucose, UA 05/04/2017 NEGATIVE   Final    Ketones, UA 05/04/2017 NEGATIVE   Final    Urobilinogen, UA 05/04/2017 0.2   Final    Bilirubin 05/04/2017 NEGATIVE   Final    Blood, UA 05/04/2017 LARGE   Final       Assessment:         ICD-10-CM ICD-9-CM   1. Dizziness R42 780.4   2. Headache, unspecified headache type R51 784.0   3. Fatigue, unspecified type R53.83 780.79   4. Tachycardia R00.0 785.0   5. Anxiousness F41.9 300.00   6. Urinary tract infection with hematuria, site unspecified N39.0 599.0    R31.9    7. PVCs (premature ventricular contractions) I49.3 427.69       Plan:       Dizziness  -  Mild tachycardia with premature beat:  EKG shows normal sinus rhythm with PVCs.  -  + UTI  -  Will get labs - CBC, CMP and TSH tomorrow morning.  -     IN  OFFICE EKG 12-LEAD (to [facility])  -     POCT urinalysis, dipstick or tablet reag  -     CBC auto differential; Future; Expected date: 5/4/17  -     Comprehensive metabolic panel; Future; Expected date: 5/4/17  -     TSH; Future; Expected date: 5/4/17    Headache, unspecified headache type  -  Mostly resolved with Vanquish    Fatigue, unspecified type  -     POCT urinalysis, dipstick or tablet reag    Tachycardia  -  Labs in AM  -     CBC auto differential; Future; Expected date: 5/4/17  -     Comprehensive metabolic panel; Future; Expected date: 5/4/17  -     TSH; Future; Expected date: 5/4/17    Anxiousness    Urinary tract infection with hematuria, site unspecified  -  Urine culture and start Bactrim as prescribed.  -     Urine culture  -     sulfamethoxazole-trimethoprim 800-160mg (BACTRIM DS) 800-160 mg Tab; Take 1 tablet by mouth 2 (two) times daily.  Dispense: 20 tablet; Refill: 0    PVCs (premature ventricular contractions)    Return in about 10 days (around 5/14/2017) for recheck.     Patient's Medications   New Prescriptions    SULFAMETHOXAZOLE-TRIMETHOPRIM 800-160MG (BACTRIM DS) 800-160 MG TAB    Take 1 tablet by mouth 2 (two) times daily.   Previous Medications    AMITRIPTYLINE (ELAVIL) 10 MG TABLET    Take 1 tablet by mouth every evening.    CELEBREX 200 MG CAPSULE    Take 1 capsule by mouth once daily.    DULOXETINE (CYMBALTA) 60 MG CAPSULE    Take 60 mg by mouth once daily.    HYOSCYAMINE (LEVSIN/SL) 0.125 MG SUBL    Place 1 tablet (0.125 mg total) under the tongue every 6 (six) hours as needed (Bladder Spasm).    LYRICA 200 MG CAP    Take 200 mg by mouth once daily.    OXYCODONE-ACETAMINOPHEN (PERCOCET)  MG PER TABLET    Take 1 tablet by mouth every 4 (four) hours as needed for Pain.    URIBEL 118-10-40.8-36 MG CAP    Take 1 capsule by mouth 4 (four) times daily as needed.   Modified Medications    No medications on file   Discontinued Medications    UROGESIC-BLUE 81.6-40.8-0.12 MG TAB    Take 1  tablet by mouth every 6 (six) hours as needed.

## 2017-05-06 LAB
BACTERIA UR CULT: NORMAL
BACTERIA UR CULT: NORMAL

## 2017-06-09 ENCOUNTER — OFFICE VISIT (OUTPATIENT)
Dept: UROLOGY | Facility: CLINIC | Age: 37
End: 2017-06-09
Payer: COMMERCIAL

## 2017-06-09 VITALS
SYSTOLIC BLOOD PRESSURE: 120 MMHG | TEMPERATURE: 99 F | HEIGHT: 64 IN | BODY MASS INDEX: 37.92 KG/M2 | HEART RATE: 88 BPM | WEIGHT: 222.13 LBS | DIASTOLIC BLOOD PRESSURE: 78 MMHG

## 2017-06-09 DIAGNOSIS — N13.1 HYDRONEPHROSIS CONCURRENT WITH AND DUE TO URETERAL STRICTURE: ICD-10-CM

## 2017-06-09 DIAGNOSIS — R35.0 URINARY FREQUENCY: ICD-10-CM

## 2017-06-09 DIAGNOSIS — N13.5 URETERAL STRICTURE, LEFT: Primary | ICD-10-CM

## 2017-06-09 DIAGNOSIS — N30.01 ACUTE CYSTITIS WITH HEMATURIA: ICD-10-CM

## 2017-06-09 PROCEDURE — 99214 OFFICE O/P EST MOD 30 MIN: CPT | Mod: S$GLB,,, | Performed by: UROLOGY

## 2017-06-09 PROCEDURE — 99999 PR PBB SHADOW E&M-EST. PATIENT-LVL III: CPT | Mod: PBBFAC,,, | Performed by: UROLOGY

## 2017-06-09 RX ORDER — CIPROFLOXACIN 500 MG/1
500 TABLET ORAL 2 TIMES DAILY
Qty: 28 TABLET | Refills: 0 | Status: SHIPPED | OUTPATIENT
Start: 2017-06-09 | End: 2017-06-23

## 2017-06-09 RX ORDER — OXYCODONE AND ACETAMINOPHEN 10; 325 MG/1; MG/1
1 TABLET ORAL EVERY 4 HOURS PRN
Qty: 50 TABLET | Refills: 0 | Status: SHIPPED | OUTPATIENT
Start: 2017-06-09 | End: 2017-08-15 | Stop reason: SDUPTHER

## 2017-06-09 RX ORDER — DULOXETIN HYDROCHLORIDE 30 MG/1
30 CAPSULE, DELAYED RELEASE ORAL 2 TIMES DAILY
Refills: 5 | COMMUNITY
Start: 2017-05-05 | End: 2017-06-09 | Stop reason: SDUPTHER

## 2017-06-09 RX ORDER — HYDROCODONE BITARTRATE AND ACETAMINOPHEN 10; 325 MG/1; MG/1
1 TABLET ORAL EVERY 6 HOURS PRN
Refills: 0 | COMMUNITY
Start: 2017-05-31 | End: 2017-10-24 | Stop reason: SDUPTHER

## 2017-06-09 NOTE — PATIENT INSTRUCTIONS
Cystoscopy and ureteroscopy at [facility] on 6/19/17.  Lab work at [facility] prior to procedure.

## 2017-06-09 NOTE — PROGRESS NOTES
Subjective:       Patient ID: Lesa Leyva is a 37 y.o. female.    Chief Complaint: Follow-up    36 yo WF with 7 year history of left ureteral stricture due to IUP. Pt has been treated with chronic stenting but wishes to have stent out because of recurrent UTI and Chronic pain/discomfort.      Other   This is a chronic problem. The current episode started more than 1 year ago. The problem occurs constantly. The problem has been unchanged. Associated symptoms include abdominal pain (left flank and LLQ pain with bladder spasms) and urinary symptoms (frequency, urgency, dysuria and bladder spasms.). Pertinent negatives include no anorexia, arthralgias, change in bowel habit, chest pain, chills, congestion, coughing, diaphoresis, fatigue, fever, headaches, joint swelling, myalgias, nausea, neck pain, numbness, rash, sore throat, swollen glands, vertigo, visual change, vomiting or weakness. The symptoms are aggravated by twisting and exertion (laying flat). She has tried oral narcotics, position changes and NSAIDs (stent changes) for the symptoms. The treatment provided mild relief.     Review of Systems   Constitutional: Negative for activity change, appetite change, chills, diaphoresis, fatigue and fever.   HENT: Negative for congestion, ear pain, hearing loss, nosebleeds, sinus pressure, sore throat and trouble swallowing.    Eyes: Negative for pain and visual disturbance.   Respiratory: Negative for apnea, cough and shortness of breath.    Cardiovascular: Negative for chest pain and leg swelling.   Gastrointestinal: Positive for abdominal pain (left flank and LLQ pain with bladder spasms). Negative for abdominal distention, anal bleeding, anorexia, blood in stool, change in bowel habit, constipation, diarrhea, nausea, rectal pain and vomiting.   Endocrine: Negative for cold intolerance, heat intolerance, polydipsia, polyphagia and polyuria.   Genitourinary: Positive for dyspareunia, dysuria, flank pain,  hematuria, pelvic pain and urgency. Negative for decreased urine volume, difficulty urinating, enuresis, frequency, genital sores, menstrual problem, vaginal bleeding, vaginal discharge and vaginal pain.   Musculoskeletal: Positive for back pain. Negative for arthralgias, joint swelling, myalgias and neck pain.   Skin: Negative for color change, pallor and rash.   Allergic/Immunologic: Negative for environmental allergies, food allergies and immunocompromised state.   Neurological: Negative for dizziness, vertigo, speech difficulty, weakness, numbness and headaches.   Hematological: Negative for adenopathy. Does not bruise/bleed easily.   Psychiatric/Behavioral: Negative.        Objective:      Physical Exam   Nursing note and vitals reviewed.  Constitutional: She is oriented to person, place, and time. She appears well-developed and well-nourished.   HENT:   Head: Normocephalic.   Nose: Nose normal.   Mouth/Throat: Oropharynx is clear and moist.   Eyes: Conjunctivae and EOM are normal. Pupils are equal, round, and reactive to light.   Neck: Normal range of motion. Neck supple.   Cardiovascular: Normal rate, regular rhythm, normal heart sounds and intact distal pulses.    Pulmonary/Chest: Effort normal and breath sounds normal.   Abdominal: Soft. Bowel sounds are normal. She exhibits no distension and no mass. There is tenderness (LLQ). There is no rebound and no guarding.   Genitourinary:   Genitourinary Comments: Left CVAT   Musculoskeletal: Normal range of motion.   Neurological: She is alert and oriented to person, place, and time. She has normal reflexes.   Skin: Skin is warm and dry.     Psychiatric: She has a normal mood and affect. Her behavior is normal. Judgment and thought content normal.       Assessment:       1. Ureteral stricture, left    2. Hydronephrosis concurrent with and due to ureteral stricture    3. Urinary frequency    4. Acute cystitis with hematuria        Plan:       Patient Instructions    Cystoscopy and ureteroscopy at hospitals on 6/19/17.  Lab work at [facility] prior to procedure.

## 2017-06-09 NOTE — ADDENDUM NOTE
Encounter addended by: Angel Hernandez MD on: 6/9/2017  4:52 PM<BR>    Actions taken:  activity accessed

## 2017-06-14 ENCOUNTER — TELEPHONE (OUTPATIENT)
Dept: INTERNAL MEDICINE | Facility: CLINIC | Age: 37
End: 2017-06-14

## 2017-06-14 ENCOUNTER — LAB VISIT (OUTPATIENT)
Dept: LAB | Facility: HOSPITAL | Age: 37
End: 2017-06-14
Attending: UROLOGY
Payer: COMMERCIAL

## 2017-06-14 DIAGNOSIS — N13.5 URETERAL STRICTURE, LEFT: ICD-10-CM

## 2017-06-14 DIAGNOSIS — N13.1 HYDRONEPHROSIS CONCURRENT WITH AND DUE TO URETERAL STRICTURE: ICD-10-CM

## 2017-06-14 DIAGNOSIS — R35.0 URINARY FREQUENCY: ICD-10-CM

## 2017-06-14 DIAGNOSIS — N30.01 ACUTE CYSTITIS WITH HEMATURIA: ICD-10-CM

## 2017-06-14 LAB
ANION GAP SERPL CALC-SCNC: 11 MMOL/L
BASOPHILS # BLD AUTO: 0.03 K/UL
BASOPHILS NFR BLD: 0.4 %
BUN SERPL-MCNC: 15 MG/DL
CALCIUM SERPL-MCNC: 9.6 MG/DL
CHLORIDE SERPL-SCNC: 103 MMOL/L
CO2 SERPL-SCNC: 30 MMOL/L
CREAT SERPL-MCNC: 0.67 MG/DL
DIFFERENTIAL METHOD: ABNORMAL
EOSINOPHIL # BLD AUTO: 0.1 K/UL
EOSINOPHIL NFR BLD: 1.1 %
ERYTHROCYTE [DISTWIDTH] IN BLOOD BY AUTOMATED COUNT: 14.9 %
EST. GFR  (AFRICAN AMERICAN): >60 ML/MIN/1.73 M^2
EST. GFR  (NON AFRICAN AMERICAN): >60 ML/MIN/1.73 M^2
GLUCOSE SERPL-MCNC: 87 MG/DL
HCT VFR BLD AUTO: 38.1 %
HGB BLD-MCNC: 12.1 G/DL
LYMPHOCYTES # BLD AUTO: 2.2 K/UL
LYMPHOCYTES NFR BLD: 29.2 %
MCH RBC QN AUTO: 26.4 PG
MCHC RBC AUTO-ENTMCNC: 31.8 %
MCV RBC AUTO: 83 FL
MONOCYTES # BLD AUTO: 0.8 K/UL
MONOCYTES NFR BLD: 10.9 %
NEUTROPHILS # BLD AUTO: 4.4 K/UL
NEUTROPHILS NFR BLD: 57.5 %
PLATELET # BLD AUTO: 274 K/UL
PMV BLD AUTO: 12.7 FL
POTASSIUM SERPL-SCNC: 3.9 MMOL/L
RBC # BLD AUTO: 4.59 M/UL
SODIUM SERPL-SCNC: 144 MMOL/L
WBC # BLD AUTO: 7.58 K/UL

## 2017-06-14 PROCEDURE — 80048 BASIC METABOLIC PNL TOTAL CA: CPT | Mod: PO

## 2017-06-14 PROCEDURE — 36415 COLL VENOUS BLD VENIPUNCTURE: CPT | Mod: PO

## 2017-06-14 PROCEDURE — 85025 COMPLETE CBC W/AUTO DIFF WBC: CPT | Mod: PO

## 2017-06-14 NOTE — TELEPHONE ENCOUNTER
----- Message from Sherron Garcia sent at 6/14/2017 11:49 AM CDT -----  Contact: Miracle/[phone]  He thinks he might of pulled a muscle in his chest and he would like to be seen today.  He has had the chest pain for about 3 weeks; she does not believe it is his heart; so she doesn't think he should go to the ER.

## 2017-07-18 ENCOUNTER — OFFICE VISIT (OUTPATIENT)
Dept: UROLOGY | Facility: CLINIC | Age: 37
End: 2017-07-18
Payer: COMMERCIAL

## 2017-07-18 VITALS
RESPIRATION RATE: 18 BRPM | DIASTOLIC BLOOD PRESSURE: 78 MMHG | HEIGHT: 64 IN | OXYGEN SATURATION: 98 % | WEIGHT: 226.5 LBS | TEMPERATURE: 98 F | BODY MASS INDEX: 38.67 KG/M2 | SYSTOLIC BLOOD PRESSURE: 124 MMHG | HEART RATE: 123 BPM

## 2017-07-18 DIAGNOSIS — R11.0 NAUSEA: ICD-10-CM

## 2017-07-18 DIAGNOSIS — R10.9 LEFT FLANK PAIN: Primary | ICD-10-CM

## 2017-07-18 PROCEDURE — 99213 OFFICE O/P EST LOW 20 MIN: CPT | Mod: S$GLB,,, | Performed by: UROLOGY

## 2017-07-18 PROCEDURE — 99999 PR PBB SHADOW E&M-EST. PATIENT-LVL III: CPT | Mod: PBBFAC,,, | Performed by: UROLOGY

## 2017-07-18 NOTE — PATIENT INSTRUCTIONS
Report to Dr. Lemus today for admission and evaluation.  Plan to admit for intractable pain and get CT Urogram and then treat as necessary.

## 2017-07-18 NOTE — PROGRESS NOTES
Subjective:       Patient ID: Lesa Leyva is a 37 y.o. female.    Chief Complaint: Follow-up (stent removed)    36 yo WF with left flank pain since stent removed on 7/14/17.      Flank Pain   This is a chronic problem. The current episode started more than 1 year ago. The problem occurs constantly. The problem is unchanged. The pain is present in the costovertebral angle. The pain is at a severity of 10/10. The pain is severe. The pain is the same all the time. The symptoms are aggravated by urinating. Associated symptoms include dysuria. Pertinent negatives include no abdominal pain, bladder incontinence, bowel incontinence, chest pain, fever, headaches, leg pain, numbness, paresis, paresthesias, pelvic pain, perianal numbness, tingling, weakness or weight loss. Risk factors include lack of exercise, obesity and sedentary lifestyle (history of left ureteral stricture). She has tried NSAIDs (long-term stenting in past) for the symptoms. The treatment provided significant relief.     Review of Systems   Constitutional: Negative for activity change, appetite change, chills, fatigue, fever and weight loss.   HENT: Negative for congestion, ear pain, hearing loss, nosebleeds, sinus pressure, sore throat and trouble swallowing.    Eyes: Negative for pain and visual disturbance.   Respiratory: Negative for apnea, cough and shortness of breath.    Cardiovascular: Negative for chest pain and leg swelling.   Gastrointestinal: Negative for abdominal distention, abdominal pain, anal bleeding, blood in stool, bowel incontinence, constipation, diarrhea, nausea, rectal pain and vomiting.   Endocrine: Negative for cold intolerance, heat intolerance, polydipsia, polyphagia and polyuria.   Genitourinary: Positive for dysuria and flank pain. Negative for bladder incontinence, decreased urine volume, difficulty urinating, dyspareunia, enuresis, frequency, genital sores, hematuria, menstrual problem, pelvic pain, urgency, vaginal  bleeding, vaginal discharge and vaginal pain.   Musculoskeletal: Negative for arthralgias and back pain.   Skin: Negative for color change, pallor and rash.   Allergic/Immunologic: Negative for environmental allergies, food allergies and immunocompromised state.   Neurological: Negative for dizziness, tingling, speech difficulty, weakness, numbness, headaches and paresthesias.   Hematological: Negative for adenopathy. Does not bruise/bleed easily.   Psychiatric/Behavioral: Negative.        Objective:      Physical Exam   Nursing note and vitals reviewed.  Constitutional: She is oriented to person, place, and time. She appears well-developed and well-nourished.   HENT:   Head: Normocephalic.   Nose: Nose normal.   Mouth/Throat: Oropharynx is clear and moist.   Eyes: Conjunctivae and EOM are normal. Pupils are equal, round, and reactive to light.   Neck: Normal range of motion. Neck supple.   Cardiovascular: Normal rate, regular rhythm, normal heart sounds and intact distal pulses.    Pulmonary/Chest: Effort normal and breath sounds normal.   Abdominal: Soft. Bowel sounds are normal.   Genitourinary:   Genitourinary Comments: Severe Left CVAT   Musculoskeletal: Normal range of motion.   Neurological: She is alert and oriented to person, place, and time. She has normal reflexes.   Skin: Skin is warm and dry.     Psychiatric: She has a normal mood and affect. Her behavior is normal. Judgment and thought content normal.       Assessment:       1. Left flank pain    2. Nausea        Plan:       Patient Instructions   Report to Dr. Lemus today for admission and evaluation.  Plan to admit for intractable pain and get CT Urogram and then treat as necessary.

## 2017-07-31 ENCOUNTER — TELEPHONE (OUTPATIENT)
Dept: UROLOGY | Facility: CLINIC | Age: 37
End: 2017-07-31

## 2017-08-15 ENCOUNTER — TELEPHONE (OUTPATIENT)
Dept: UROLOGY | Facility: CLINIC | Age: 37
End: 2017-08-15

## 2017-08-15 ENCOUNTER — OFFICE VISIT (OUTPATIENT)
Dept: UROLOGY | Facility: CLINIC | Age: 37
End: 2017-08-15
Payer: COMMERCIAL

## 2017-08-15 VITALS
HEART RATE: 69 BPM | BODY MASS INDEX: 38.58 KG/M2 | SYSTOLIC BLOOD PRESSURE: 138 MMHG | HEIGHT: 64 IN | DIASTOLIC BLOOD PRESSURE: 69 MMHG | WEIGHT: 226 LBS

## 2017-08-15 DIAGNOSIS — N12 PYELONEPHRITIS: ICD-10-CM

## 2017-08-15 DIAGNOSIS — N39.0 URINARY TRACT INFECTION WITHOUT HEMATURIA, SITE UNSPECIFIED: Primary | ICD-10-CM

## 2017-08-15 DIAGNOSIS — R50.9 FEVER AND CHILLS: ICD-10-CM

## 2017-08-15 DIAGNOSIS — R10.9 FLANK PAIN: ICD-10-CM

## 2017-08-15 DIAGNOSIS — R35.0 URINARY FREQUENCY: ICD-10-CM

## 2017-08-15 DIAGNOSIS — R11.0 NAUSEA: ICD-10-CM

## 2017-08-15 DIAGNOSIS — R30.0 DYSURIA: ICD-10-CM

## 2017-08-15 LAB
BACTERIA #/AREA URNS AUTO: ABNORMAL /HPF
BILIRUB UR QL STRIP: NEGATIVE
CLARITY UR REFRACT.AUTO: ABNORMAL
COLOR UR AUTO: YELLOW
GLUCOSE UR QL STRIP: NEGATIVE
HGB UR QL STRIP: ABNORMAL
HYALINE CASTS UR QL AUTO: 0 /LPF
KETONES UR QL STRIP: ABNORMAL
LEUKOCYTE ESTERASE UR QL STRIP: ABNORMAL
MICROSCOPIC COMMENT: ABNORMAL
NITRITE UR QL STRIP: POSITIVE
PH UR STRIP: 7 [PH] (ref 5–8)
PROT UR QL STRIP: ABNORMAL
RBC #/AREA URNS AUTO: 51 /HPF (ref 0–4)
SP GR UR STRIP: 1.01 (ref 1–1.03)
SQUAMOUS #/AREA URNS AUTO: 2 /HPF
URN SPEC COLLECT METH UR: ABNORMAL
UROBILINOGEN UR STRIP-ACNC: NEGATIVE EU/DL
WBC #/AREA URNS AUTO: >100 /HPF (ref 0–5)
WBC CLUMPS UR QL AUTO: ABNORMAL

## 2017-08-15 PROCEDURE — 81001 URINALYSIS AUTO W/SCOPE: CPT

## 2017-08-15 PROCEDURE — 3008F BODY MASS INDEX DOCD: CPT | Mod: S$GLB,,, | Performed by: UROLOGY

## 2017-08-15 PROCEDURE — 87088 URINE BACTERIA CULTURE: CPT

## 2017-08-15 PROCEDURE — 99214 OFFICE O/P EST MOD 30 MIN: CPT | Mod: S$GLB,,, | Performed by: UROLOGY

## 2017-08-15 PROCEDURE — 87186 SC STD MICRODIL/AGAR DIL: CPT

## 2017-08-15 PROCEDURE — 87086 URINE CULTURE/COLONY COUNT: CPT

## 2017-08-15 PROCEDURE — 87077 CULTURE AEROBIC IDENTIFY: CPT

## 2017-08-15 PROCEDURE — 99999 PR PBB SHADOW E&M-EST. PATIENT-LVL IV: CPT | Mod: PBBFAC,,, | Performed by: UROLOGY

## 2017-08-15 RX ORDER — CIPROFLOXACIN 250 MG/1
250 TABLET, FILM COATED ORAL 2 TIMES DAILY
Refills: 0 | COMMUNITY
Start: 2017-08-04 | End: 2017-09-12 | Stop reason: HOSPADM

## 2017-08-15 RX ORDER — PHENAZOPYRIDINE HYDROCHLORIDE 200 MG/1
200 TABLET, FILM COATED ORAL 3 TIMES DAILY
Refills: 0 | COMMUNITY
Start: 2017-08-04 | End: 2017-12-05 | Stop reason: HOSPADM

## 2017-08-15 RX ORDER — TAMSULOSIN HYDROCHLORIDE 0.4 MG/1
1 CAPSULE ORAL NIGHTLY
Refills: 3 | COMMUNITY
Start: 2017-06-30 | End: 2017-12-05 | Stop reason: HOSPADM

## 2017-08-15 RX ORDER — OXYCODONE HYDROCHLORIDE 5 MG/1
5 TABLET ORAL
Refills: 0 | COMMUNITY
Start: 2017-07-14 | End: 2017-12-05 | Stop reason: HOSPADM

## 2017-08-15 RX ORDER — OXYCODONE AND ACETAMINOPHEN 10; 325 MG/1; MG/1
1 TABLET ORAL EVERY 4 HOURS PRN
Qty: 50 TABLET | Refills: 0 | Status: SHIPPED | OUTPATIENT
Start: 2017-08-15 | End: 2017-09-05 | Stop reason: SDUPTHER

## 2017-08-15 RX ORDER — CIPROFLOXACIN 500 MG/1
500 TABLET ORAL 2 TIMES DAILY
Qty: 14 TABLET | Refills: 1 | Status: SHIPPED | OUTPATIENT
Start: 2017-08-15 | End: 2017-08-22

## 2017-08-15 RX ORDER — DULOXETIN HYDROCHLORIDE 30 MG/1
30 CAPSULE, DELAYED RELEASE ORAL 2 TIMES DAILY
Refills: 5 | COMMUNITY
Start: 2017-08-08 | End: 2017-09-12 | Stop reason: HOSPADM

## 2017-08-15 RX ORDER — METHENAMINE, SODIUM PHOSPHATE, MONOBASIC, MONOHYDRATE, PHENYL SALICYLATE, METHYLENE BLUE, AND HYOSCYAMINE SULFATE 118; 40.8; 36; 10; .12 MG/1; MG/1; MG/1; MG/1; MG/1
1 CAPSULE ORAL 4 TIMES DAILY PRN
Qty: 40 CAPSULE | Refills: 12 | Status: SHIPPED | OUTPATIENT
Start: 2017-08-15 | End: 2018-03-26 | Stop reason: SDUPTHER

## 2017-08-15 NOTE — TELEPHONE ENCOUNTER
----- Message from Eulalia Cole sent at 8/15/2017  8:03 AM CDT -----  Contact: [phone]  Pt called stating she has a fever of 102 and she has a bladder infection . Pt its requesting an appointment for today . Please advise

## 2017-08-15 NOTE — PATIENT INSTRUCTIONS
Treat new onset UTI symptoms and symptoms of pyelonephritis with antibiotics and pain meds as needed.  Check U/A and Urine C+S.  F/U 1 month to arrange stent change ([employer]) at [facility]

## 2017-08-15 NOTE — PROGRESS NOTES
Subjective:       Patient ID: Lesa Leyva is a 37 y.o. female.    Chief Complaint: Urinary Tract Infection    37 year old WF with history of Left Ureteral stricture recently evaluated under a second opinion by Dr. Nobles. Stent replaced 11 days ago. Pt developed bilateral flank pain and fever of 102.1 degrees and shaking chills over night. Present for emergent evaluation and treatment of possible bilateral pyelonephritis.      Urinary Tract Infection    This is a new problem. The current episode started acute onset. The problem occurs every urination. The problem has been rapidly worsening. The quality of the pain is described as burning and stabbing. The pain is at a severity of 8/10. The pain is severe. The maximum temperature recorded prior to her arrival was 102 - 102.9 F. The fever has been present for less than 1 day. She is sexually active. There is a history of pyelonephritis. Associated symptoms include chills, flank pain (bilateral), frequency, hematuria, sweats and urgency. Pertinent negatives include no behavior changes, discharge, hesitancy, nausea, possible pregnancy, vomiting, weight loss, bubble bath use, constipation, rash or withholding. She has tried nothing for the symptoms. Her past medical history is significant for kidney stones and recurrent UTIs. There is no history of catheterization, diabetes insipidus, diabetes mellitus, genitourinary reflux, hypertension, a single kidney, STD, urinary stasis or a urological procedure.     Review of Systems   Constitutional: Positive for chills. Negative for activity change, appetite change, fatigue, fever and weight loss.   HENT: Negative for congestion, ear pain, hearing loss, nosebleeds, sinus pressure, sore throat and trouble swallowing.    Eyes: Negative for pain and visual disturbance.   Respiratory: Negative for apnea, cough and shortness of breath.    Cardiovascular: Negative for chest pain and leg swelling.   Gastrointestinal:  Negative for abdominal distention, abdominal pain, anal bleeding, blood in stool, constipation, diarrhea, nausea, rectal pain and vomiting.   Endocrine: Negative for cold intolerance, heat intolerance, polydipsia, polyphagia and polyuria.   Genitourinary: Positive for flank pain (bilateral), frequency, hematuria and urgency. Negative for decreased urine volume, difficulty urinating, dyspareunia, dysuria, enuresis, genital sores, hesitancy, menstrual problem, pelvic pain, vaginal bleeding, vaginal discharge and vaginal pain.   Musculoskeletal: Negative for arthralgias and back pain.   Skin: Negative for color change, pallor and rash.   Allergic/Immunologic: Negative for environmental allergies, food allergies and immunocompromised state.   Neurological: Negative for dizziness, speech difficulty, weakness and headaches.   Hematological: Negative for adenopathy. Does not bruise/bleed easily.   Psychiatric/Behavioral: Negative.        Objective:      Physical Exam   Nursing note and vitals reviewed.  Constitutional: She is oriented to person, place, and time. She appears well-developed and well-nourished.   HENT:   Head: Normocephalic.   Nose: Nose normal.   Mouth/Throat: Oropharynx is clear and moist.   Eyes: Conjunctivae and EOM are normal. Pupils are equal, round, and reactive to light.   Neck: Normal range of motion. Neck supple.   Cardiovascular: Normal rate, regular rhythm, normal heart sounds and intact distal pulses.    Pulmonary/Chest: Effort normal and breath sounds normal.   Abdominal: Soft. Bowel sounds are normal.   Genitourinary: Vagina normal.   Genitourinary Comments: Bilateral CVAT and bladder tenderness.   Musculoskeletal: Normal range of motion.   Neurological: She is alert and oriented to person, place, and time. She has normal reflexes.   Skin: Skin is warm and dry.     Psychiatric: She has a normal mood and affect. Her behavior is normal. Judgment and thought content normal.       Assessment:        1. Urinary tract infection without hematuria, site unspecified    2. Flank pain    3. Urinary frequency    4. Dysuria    5. Fever and chills    6. Nausea        Plan:       Patient Instructions   Treat new onset UTI symptoms and symptoms of pyelonephritis with antibiotics and pain meds as needed.  Check U/A and Urine C+S.  F/U 1 month to arrange stent change ([employer]) at [facility]

## 2017-08-18 LAB — BACTERIA UR CULT: NORMAL

## 2017-08-22 ENCOUNTER — TELEPHONE (OUTPATIENT)
Dept: UROLOGY | Facility: CLINIC | Age: 37
End: 2017-08-22

## 2017-08-22 NOTE — TELEPHONE ENCOUNTER
----- Message from Ronna Holcomb sent at 8/22/2017  9:13 AM CDT -----  Contact: [phone]/self  Pt states she's returning your call.  Please advise

## 2017-09-05 RX ORDER — OXYCODONE AND ACETAMINOPHEN 10; 325 MG/1; MG/1
1 TABLET ORAL EVERY 4 HOURS PRN
Qty: 50 TABLET | Refills: 0 | Status: SHIPPED | OUTPATIENT
Start: 2017-09-05 | End: 2017-09-29 | Stop reason: SDUPTHER

## 2017-09-05 NOTE — TELEPHONE ENCOUNTER
----- Message from Grayson Becker sent at 9/5/2017  8:16 AM CDT -----  Contact: self/[phone]  Patient called to advise that her pain is getting worse and she needs a prescription for her pain medication.  Also, she needs to know if you will be scheduling her stent replacement sooner.    Please call and advise.    oxycodone-acetaminophen (PERCOCET)  mg per tablet

## 2017-09-06 ENCOUNTER — TELEPHONE (OUTPATIENT)
Dept: UROLOGY | Facility: CLINIC | Age: 37
End: 2017-09-06

## 2017-09-06 NOTE — TELEPHONE ENCOUNTER
----- Message from Grayson Becker sent at 9/6/2017 12:06 PM CDT -----  Contact: self/[phone]  Patient states this is her 2nd call in regard to her current pain and she has not received a call back.    Please call and advise.

## 2017-09-12 ENCOUNTER — OFFICE VISIT (OUTPATIENT)
Dept: UROLOGY | Facility: CLINIC | Age: 37
End: 2017-09-12
Payer: COMMERCIAL

## 2017-09-12 VITALS — HEIGHT: 64 IN | BODY MASS INDEX: 38.58 KG/M2 | WEIGHT: 226 LBS

## 2017-09-12 DIAGNOSIS — N39.0 URINARY TRACT INFECTION WITHOUT HEMATURIA, SITE UNSPECIFIED: Primary | ICD-10-CM

## 2017-09-12 DIAGNOSIS — R35.0 URINARY FREQUENCY: ICD-10-CM

## 2017-09-12 DIAGNOSIS — N13.5 URETERAL STRICTURE, LEFT: ICD-10-CM

## 2017-09-12 DIAGNOSIS — R10.9 LEFT FLANK PAIN: ICD-10-CM

## 2017-09-12 LAB
BACTERIA #/AREA URNS AUTO: ABNORMAL /HPF
BILIRUB UR QL STRIP: NEGATIVE
CLARITY UR REFRACT.AUTO: ABNORMAL
COLOR UR AUTO: YELLOW
GLUCOSE UR QL STRIP: NEGATIVE
HGB UR QL STRIP: ABNORMAL
HYALINE CASTS UR QL AUTO: 0 /LPF
KETONES UR QL STRIP: NEGATIVE
LEUKOCYTE ESTERASE UR QL STRIP: ABNORMAL
MICROSCOPIC COMMENT: ABNORMAL
NITRITE UR QL STRIP: POSITIVE
PH UR STRIP: 5 [PH] (ref 5–8)
PROT UR QL STRIP: ABNORMAL
RBC #/AREA URNS AUTO: >100 /HPF (ref 0–4)
SP GR UR STRIP: 1.01 (ref 1–1.03)
SQUAMOUS #/AREA URNS AUTO: 1 /HPF
URN SPEC COLLECT METH UR: ABNORMAL
UROBILINOGEN UR STRIP-ACNC: NEGATIVE EU/DL
WBC #/AREA URNS AUTO: >100 /HPF (ref 0–5)

## 2017-09-12 PROCEDURE — 3008F BODY MASS INDEX DOCD: CPT | Mod: S$GLB,,, | Performed by: UROLOGY

## 2017-09-12 PROCEDURE — 99999 PR PBB SHADOW E&M-EST. PATIENT-LVL III: CPT | Mod: PBBFAC,,, | Performed by: UROLOGY

## 2017-09-12 PROCEDURE — 87186 SC STD MICRODIL/AGAR DIL: CPT

## 2017-09-12 PROCEDURE — 99214 OFFICE O/P EST MOD 30 MIN: CPT | Mod: S$GLB,,, | Performed by: UROLOGY

## 2017-09-12 PROCEDURE — 87086 URINE CULTURE/COLONY COUNT: CPT

## 2017-09-12 PROCEDURE — 87088 URINE BACTERIA CULTURE: CPT

## 2017-09-12 PROCEDURE — 87077 CULTURE AEROBIC IDENTIFY: CPT

## 2017-09-12 PROCEDURE — 81001 URINALYSIS AUTO W/SCOPE: CPT

## 2017-09-12 RX ORDER — AMOXICILLIN AND CLAVULANATE POTASSIUM 875; 125 MG/1; MG/1
1 TABLET, FILM COATED ORAL EVERY 12 HOURS
Qty: 20 TABLET | Refills: 0 | Status: SHIPPED | OUTPATIENT
Start: 2017-09-12 | End: 2017-09-22

## 2017-09-12 NOTE — PROGRESS NOTES
Subjective:       Patient ID: Lesa Leyva is a 37 y.o. female.    Chief Complaint: No chief complaint on file.    38 yo WF with chronic left flank pain due to hydronephrosis and left ureteral stricture. Requires Stent Change. UTI symptoms.      Urinary Tract Infection    This is a recurrent problem. The current episode started in the past 7 days. The problem occurs every urination. The problem has been gradually worsening. The quality of the pain is described as burning. The pain is at a severity of 8/10. The pain is severe. The maximum temperature recorded prior to her arrival was 100 - 100.9 F. The fever has been present for 5 days or more. She is sexually active. There is no history of pyelonephritis. Associated symptoms include chills, flank pain, frequency and hematuria. Pertinent negatives include no behavior changes, discharge, hesitancy, nausea, possible pregnancy, sweats, urgency, vomiting, weight loss, bubble bath use, constipation, rash or withholding. She has tried antibiotics for the symptoms. The treatment provided mild relief. Her past medical history is significant for kidney stones, recurrent UTIs and a urological procedure. There is no history of catheterization, diabetes insipidus, diabetes mellitus, genitourinary reflux, hypertension, a single kidney, STD or urinary stasis.   Flank Pain   This is a chronic problem. The current episode started more than 1 year ago. The problem occurs constantly. The problem has been waxing and waning since onset. The pain is present in the costovertebral angle. The quality of the pain is described as stabbing. The pain does not radiate. The pain is at a severity of 7/10. The pain is moderate. The pain is the same all the time. The symptoms are aggravated by twisting and position. Stiffness is present in the morning. Associated symptoms include abdominal pain (left side), dysuria and pelvic pain. Pertinent negatives include no bladder incontinence, bowel  incontinence, chest pain, fever, headaches, leg pain, numbness, paresis, paresthesias, perianal numbness, tingling, weakness or weight loss. Risk factors include renal stones, sedentary lifestyle, obesity and lack of exercise. She has tried analgesics, NSAIDs and heat for the symptoms. The treatment provided moderate relief.     Review of Systems   Constitutional: Positive for chills. Negative for activity change, appetite change, fatigue, fever and weight loss.   HENT: Negative for congestion, ear pain, hearing loss, nosebleeds, sinus pressure, sore throat and trouble swallowing.    Eyes: Negative for pain and visual disturbance.   Respiratory: Negative for apnea, cough and shortness of breath.    Cardiovascular: Negative for chest pain and leg swelling.   Gastrointestinal: Positive for abdominal pain (left side). Negative for abdominal distention, anal bleeding, blood in stool, bowel incontinence, constipation, diarrhea, nausea, rectal pain and vomiting.   Endocrine: Negative for cold intolerance, heat intolerance, polydipsia, polyphagia and polyuria.   Genitourinary: Positive for dysuria, flank pain, frequency, hematuria and pelvic pain. Negative for bladder incontinence, decreased urine volume, difficulty urinating, dyspareunia, enuresis, genital sores, hesitancy, menstrual problem, urgency, vaginal bleeding, vaginal discharge and vaginal pain.   Musculoskeletal: Negative for arthralgias and back pain.   Skin: Negative for color change, pallor and rash.   Allergic/Immunologic: Negative for environmental allergies, food allergies and immunocompromised state.   Neurological: Negative for dizziness, tingling, speech difficulty, weakness, numbness, headaches and paresthesias.   Hematological: Negative for adenopathy. Does not bruise/bleed easily.   Psychiatric/Behavioral: Negative.        Objective:      Physical Exam   Nursing note and vitals reviewed.  Constitutional: She is oriented to person, place, and time.  She appears well-developed and well-nourished.   HENT:   Head: Normocephalic.   Nose: Nose normal.   Mouth/Throat: Oropharynx is clear and moist.   Eyes: Conjunctivae and EOM are normal. Pupils are equal, round, and reactive to light.   Neck: Normal range of motion. Neck supple.   Cardiovascular: Normal rate, regular rhythm, normal heart sounds and intact distal pulses.    Pulmonary/Chest: Effort normal and breath sounds normal.   Abdominal: Soft. Bowel sounds are normal.   Musculoskeletal: Normal range of motion.   Neurological: She is alert and oriented to person, place, and time. She has normal reflexes.   Skin: Skin is warm and dry.     Psychiatric: She has a normal mood and affect. Her behavior is normal. Judgment and thought content normal.       Assessment:       1. Urinary tract infection without hematuria, site unspecified    2. Urinary frequency    3. Left flank pain    4. Ureteral stricture, left        Plan:       Patient Instructions   Schedule Cystoscopy and left ureteral stent change at [facility] 9/18/17  F/U PRN.  Augmentin, percocet  U/A and Urine Culture.

## 2017-09-12 NOTE — PATIENT INSTRUCTIONS
Schedule Cystoscopy and left ureteral stent change at [facility] 9/18/17  F/U PRN.  Augmentin, percocet  U/A and Urine Culture.

## 2017-09-14 ENCOUNTER — TELEPHONE (OUTPATIENT)
Dept: UROLOGY | Facility: CLINIC | Age: 37
End: 2017-09-14

## 2017-09-14 LAB — BACTERIA UR CULT: NORMAL

## 2017-09-29 ENCOUNTER — TELEPHONE (OUTPATIENT)
Dept: UROLOGY | Facility: CLINIC | Age: 37
End: 2017-09-29

## 2017-09-29 RX ORDER — OXYCODONE AND ACETAMINOPHEN 10; 325 MG/1; MG/1
1 TABLET ORAL EVERY 4 HOURS PRN
Qty: 50 TABLET | Refills: 0 | Status: SHIPPED | OUTPATIENT
Start: 2017-09-29 | End: 2017-10-24 | Stop reason: SDUPTHER

## 2017-09-29 NOTE — TELEPHONE ENCOUNTER
----- Message from Grayson Becker sent at 9/29/2017  8:13 AM CDT -----  Contact: self/[phone]  Patient is requesting a refill on their medication.  Please call and advise when sent to pharmacy.    oxycodone-acetaminophen (PERCOCET)  mg per tablet    [id number] - [place], [place] - [id number] [facility]

## 2017-10-16 ENCOUNTER — TELEPHONE (OUTPATIENT)
Dept: UROLOGY | Facility: CLINIC | Age: 37
End: 2017-10-16

## 2017-10-16 DIAGNOSIS — N39.0 URINARY TRACT INFECTION WITHOUT HEMATURIA, SITE UNSPECIFIED: Primary | ICD-10-CM

## 2017-10-16 NOTE — TELEPHONE ENCOUNTER
----- Message from Diane Solorio sent at 10/16/2017  7:57 AM CDT -----  Contact: [phone]  Patient states she has a kidney infection and would like to get an antibiotic and pain meds

## 2017-10-16 NOTE — TELEPHONE ENCOUNTER
----- Message from Ronna Holcomb sent at 10/16/2017  1:17 PM CDT -----  Contact: [phone]/self  Pt states she's returning your call.  Please call and advise

## 2017-10-18 ENCOUNTER — TELEPHONE (OUTPATIENT)
Dept: UROLOGY | Facility: CLINIC | Age: 37
End: 2017-10-18

## 2017-10-18 NOTE — TELEPHONE ENCOUNTER
----- Message from Gretchen Kumar sent at 10/18/2017  8:48 AM CDT -----  Contact: Self/ [phone]  Patient would like to speak with you about getting test results. Patient would like to let you know she is running fever. Please advise.

## 2017-10-20 RX ORDER — NITROFURANTOIN (MACROCRYSTALS) 100 MG/1
100 CAPSULE ORAL EVERY 6 HOURS
Qty: 28 CAPSULE | Status: CANCELLED | OUTPATIENT
Start: 2017-10-20

## 2017-10-20 NOTE — TELEPHONE ENCOUNTER
----- Message from Sherron Garcia sent at 10/20/2017  9:02 AM CDT -----  Contact: self/[phone]  TEST RESULTS:  Patient would like to get test results.  Name of test: urinalysis  Date of test:10-17-17

## 2017-10-23 RX ORDER — NITROFURANTOIN (MACROCRYSTALS) 100 MG/1
100 CAPSULE ORAL EVERY 6 HOURS
Qty: 28 CAPSULE | Refills: 0 | Status: SHIPPED | OUTPATIENT
Start: 2017-10-23 | End: 2017-12-05 | Stop reason: HOSPADM

## 2017-10-24 RX ORDER — OXYCODONE AND ACETAMINOPHEN 10; 325 MG/1; MG/1
1 TABLET ORAL EVERY 4 HOURS PRN
Qty: 50 TABLET | Refills: 0 | Status: SHIPPED | OUTPATIENT
Start: 2017-10-24 | End: 2017-12-05 | Stop reason: HOSPADM

## 2017-10-24 RX ORDER — HYDROCODONE BITARTRATE AND ACETAMINOPHEN 10; 325 MG/1; MG/1
1 TABLET ORAL EVERY 6 HOURS PRN
Qty: 50 TABLET | Refills: 0 | Status: SHIPPED | OUTPATIENT
Start: 2017-10-24 | End: 2017-11-20 | Stop reason: SDUPTHER

## 2017-11-20 ENCOUNTER — TELEPHONE (OUTPATIENT)
Dept: UROLOGY | Facility: CLINIC | Age: 37
End: 2017-11-20

## 2017-11-20 RX ORDER — HYDROCODONE BITARTRATE AND ACETAMINOPHEN 10; 325 MG/1; MG/1
1 TABLET ORAL EVERY 6 HOURS PRN
Qty: 50 TABLET | Refills: 0 | Status: SHIPPED | OUTPATIENT
Start: 2017-11-20 | End: 2017-12-05 | Stop reason: HOSPADM

## 2017-11-20 NOTE — TELEPHONE ENCOUNTER
----- Message from Gretchen Kumar sent at 11/20/2017  8:18 AM CST -----  Contact: Self/ [phone]  Patient would like a refill for her pain medication. Please advise.

## 2017-12-04 ENCOUNTER — TELEPHONE (OUTPATIENT)
Dept: UROLOGY | Facility: CLINIC | Age: 37
End: 2017-12-04

## 2017-12-04 NOTE — TELEPHONE ENCOUNTER
----- Message from Gretchen Kumar sent at 12/4/2017 12:41 PM CST -----  Contact: Self/ [phone]  Patient would like to speak with you about being seen today because she has blood in her urine and is running a fever. Please advise.

## 2017-12-05 ENCOUNTER — OFFICE VISIT (OUTPATIENT)
Dept: UROLOGY | Facility: CLINIC | Age: 37
End: 2017-12-05
Payer: COMMERCIAL

## 2017-12-05 VITALS — BODY MASS INDEX: 38.58 KG/M2 | WEIGHT: 226 LBS | HEIGHT: 64 IN

## 2017-12-05 DIAGNOSIS — R10.9 BILATERAL FLANK PAIN: ICD-10-CM

## 2017-12-05 DIAGNOSIS — N12 PYELONEPHRITIS: ICD-10-CM

## 2017-12-05 DIAGNOSIS — N30.01 ACUTE CYSTITIS WITH HEMATURIA: Primary | ICD-10-CM

## 2017-12-05 DIAGNOSIS — R30.0 DYSURIA: ICD-10-CM

## 2017-12-05 DIAGNOSIS — R11.0 NAUSEA: ICD-10-CM

## 2017-12-05 PROCEDURE — 87077 CULTURE AEROBIC IDENTIFY: CPT

## 2017-12-05 PROCEDURE — 99999 PR PBB SHADOW E&M-EST. PATIENT-LVL IV: CPT | Mod: PBBFAC,,, | Performed by: NURSE PRACTITIONER

## 2017-12-05 PROCEDURE — 87088 URINE BACTERIA CULTURE: CPT

## 2017-12-05 PROCEDURE — 99214 OFFICE O/P EST MOD 30 MIN: CPT | Mod: S$GLB,,, | Performed by: NURSE PRACTITIONER

## 2017-12-05 PROCEDURE — 87186 SC STD MICRODIL/AGAR DIL: CPT

## 2017-12-05 PROCEDURE — 87086 URINE CULTURE/COLONY COUNT: CPT

## 2017-12-05 RX ORDER — TRIPROLIDINE/PSEUDOEPHEDRINE 2.5MG-60MG
TABLET ORAL EVERY 6 HOURS PRN
COMMUNITY
End: 2018-05-28

## 2017-12-05 RX ORDER — PHENAZOPYRIDINE HYDROCHLORIDE 200 MG/1
200 TABLET, FILM COATED ORAL 3 TIMES DAILY PRN
Qty: 15 TABLET | Refills: 0 | Status: CANCELLED | OUTPATIENT
Start: 2017-12-05 | End: 2017-12-10

## 2017-12-05 RX ORDER — PHENAZOPYRIDINE HYDROCHLORIDE 100 MG/1
100 TABLET, FILM COATED ORAL 3 TIMES DAILY PRN
Qty: 15 TABLET | Refills: 0 | Status: CANCELLED | OUTPATIENT
Start: 2017-12-05 | End: 2017-12-10

## 2017-12-05 RX ORDER — SULFAMETHOXAZOLE AND TRIMETHOPRIM 800; 160 MG/1; MG/1
1 TABLET ORAL 2 TIMES DAILY
Qty: 28 TABLET | Refills: 0 | Status: SHIPPED | OUTPATIENT
Start: 2017-12-05 | End: 2017-12-11 | Stop reason: ALTCHOICE

## 2017-12-05 RX ORDER — PHENAZOPYRIDINE HYDROCHLORIDE 200 MG/1
200 TABLET, FILM COATED ORAL 3 TIMES DAILY PRN
Qty: 15 TABLET | Refills: 0 | Status: SHIPPED | OUTPATIENT
Start: 2017-12-05 | End: 2017-12-10

## 2017-12-05 RX ORDER — NITROFURANTOIN 25; 75 MG/1; MG/1
100 CAPSULE ORAL 2 TIMES DAILY
Qty: 14 CAPSULE | Refills: 0 | Status: CANCELLED | OUTPATIENT
Start: 2017-12-05 | End: 2017-12-12

## 2017-12-05 RX ORDER — ONDANSETRON HYDROCHLORIDE 8 MG/1
8 TABLET, FILM COATED ORAL EVERY 8 HOURS PRN
Qty: 15 TABLET | Refills: 0 | Status: SHIPPED | OUTPATIENT
Start: 2017-12-05 | End: 2017-12-10

## 2017-12-05 RX ORDER — AMOXICILLIN AND CLAVULANATE POTASSIUM 875; 125 MG/1; MG/1
1 TABLET, FILM COATED ORAL EVERY 12 HOURS
Qty: 20 TABLET | Refills: 0 | Status: CANCELLED | OUTPATIENT
Start: 2017-12-05 | End: 2017-12-15

## 2017-12-05 NOTE — PROGRESS NOTES
Subjective:       Patient ID: Lesa Leyva is a 37 y.o. female.    Chief Complaint: Back Pain; Urinary Tract Infection (bladder infection); and Hematuria    36 yo WF here today with c/o a urinary tract infection. Patient was treated for a UTI with Augmentin on 9/12/17.  Patient had a left ureteral stent exchange on 9/18/2017.       Back Pain   This is a recurrent problem. Episode onset: 5 days ago. The problem occurs constantly. The problem is unchanged. The pain is present in the costovertebral angle. The quality of the pain is described as burning and stabbing. The pain does not radiate. The pain is at a severity of 6/10. The pain is moderate. The symptoms are aggravated by urinating and lying down. Associated symptoms include dysuria, a fever and pelvic pain. Pertinent negatives include no abdominal pain. Risk factors include renal stones and obesity. She has tried NSAIDs (Advil) for the symptoms. The treatment provided no relief.   Urinary Tract Infection    This is a new problem. Episode onset: 5 days ago. The problem occurs every urination. The problem has been unchanged. The quality of the pain is described as burning. The pain is at a severity of 6/10. The pain is moderate. Maximum temperature: 101; this morning temp 99. The fever has been present for 3 - 4 days. She is sexually active. There is a history of pyelonephritis. Associated symptoms include chills, flank pain, frequency, hematuria, nausea and urgency. Pertinent negatives include no behavior changes, hesitancy, possible pregnancy, vomiting, bubble bath use or constipation. She has tried nothing for the symptoms. Her past medical history is significant for kidney stones.   Hematuria   This is a new problem. The current episode started yesterday. The problem has been rapidly improving since onset. She describes the hematuria as gross hematuria. She reports no clotting in her urine stream. She describes her urine color as tea colored. Irritative  symptoms include frequency, nocturia (4-5x) and urgency. Associated symptoms include chills, dysuria, fever, flank pain and nausea. Pertinent negatives include no abdominal pain, hesitancy, inability to urinate or vomiting. She is sexually active. Her past medical history is significant for  trauma, kidney stones and UTI. There is no history of hypertension or tobacco use.     Review of Systems   Constitutional: Positive for chills and fever.   Gastrointestinal: Positive for nausea. Negative for abdominal pain, constipation and vomiting.   Genitourinary: Positive for dysuria, flank pain, frequency, hematuria, nocturia (4-5x), pelvic pain and urgency. Negative for hesitancy.   Musculoskeletal: Positive for back pain.       Objective:      Physical Exam   Constitutional: She is oriented to person, place, and time. She appears well-developed and well-nourished.   HENT:   Head: Normocephalic and atraumatic.   Eyes: EOM are normal. Pupils are equal, round, and reactive to light.   Neck: Normal range of motion.   Cardiovascular: Normal rate.    Pulmonary/Chest: Effort normal. No respiratory distress.   Abdominal: Soft. There is tenderness.   Musculoskeletal: Normal range of motion. She exhibits no edema.   Bilateral CVA tenderness.   Neurological: She is alert and oriented to person, place, and time. Coordination normal.   Skin: Skin is warm and dry.   Psychiatric: She has a normal mood and affect. Her behavior is normal. Judgment and thought content normal.   Nursing note and vitals reviewed.      Assessment:       1. Acute cystitis with hematuria    2. Dysuria    3. Bilateral flank pain    4. Nausea    5. Pyelonephritis        Plan:       Lesa was seen today for back pain, urinary tract infection and hematuria.    Diagnoses and all orders for this visit:    Acute cystitis with hematuria  -     POCT urine dipstick without microscope  -     Urine culture  -     sulfamethoxazole-trimethoprim 800-160mg (BACTRIM DS)  800-160 mg Tab; Take 1 tablet by mouth 2 (two) times daily.    Dysuria  -     POCT urine dipstick without microscope  -     Urine culture  -     CT Renal Stone Study ABD Pelvis WO; Future  -     phenazopyridine (PYRIDIUM) 200 MG tablet; Take 1 tablet (200 mg total) by mouth 3 (three) times daily as needed for Pain.    Bilateral flank pain  -     CT Renal Stone Study ABD Pelvis WO; Future    Nausea  -     CT Renal Stone Study ABD Pelvis WO; Future  -     ondansetron (ZOFRAN) 8 MG tablet; Take 1 tablet (8 mg total) by mouth every 8 (eight) hours as needed for Nausea.    Pyleonephritis    Other orders   - Discontinue Uribel while taking Bactrim Ds and Pyridium.     Follow-up in 2 week for urine recheck.    Soren Macrh NP

## 2017-12-05 NOTE — PATIENT INSTRUCTIONS
CT renal stone study  POCT urine dipstick and urine C+S.  Start on Bactrim DS while awaiting urine culture results.  Start Pyridium for bladder discomfort (may turn your urine orange).   Start Zofran as needed for nausea.   Discontinue Uribel while taking Bactrim Ds and Pyridium.   Follow-up in 2 week for urine recheck.

## 2017-12-07 ENCOUNTER — TELEPHONE (OUTPATIENT)
Dept: UROLOGY | Facility: CLINIC | Age: 37
End: 2017-12-07

## 2017-12-07 RX ORDER — OXYCODONE AND ACETAMINOPHEN 10; 325 MG/1; MG/1
TABLET ORAL
Refills: 0 | Status: CANCELLED | OUTPATIENT
Start: 2017-12-07

## 2017-12-07 RX ORDER — OXYCODONE AND ACETAMINOPHEN 10; 325 MG/1; MG/1
TABLET ORAL
Refills: 0 | COMMUNITY
Start: 2017-09-30 | End: 2017-12-08 | Stop reason: SDUPTHER

## 2017-12-07 NOTE — TELEPHONE ENCOUNTER
----- Message from Grayson Becker sent at 12/7/2017  2:21 PM CST -----  Contact: self/[phone]  Patient called to advise she needs a refill of the medication below.  Please call and advise when she can come pick it up.    oxyCODONE-acetaminophen (PERCOCET)  mg per tablet

## 2017-12-07 NOTE — TELEPHONE ENCOUNTER
----- Message from Eulalia Cole sent at 12/7/2017  8:19 AM CST -----  Contact: [phone]/ self   Pt want to known if her surgery its schedule already . Please advise

## 2017-12-08 LAB — BACTERIA UR CULT: NORMAL

## 2017-12-08 RX ORDER — OXYCODONE AND ACETAMINOPHEN 10; 325 MG/1; MG/1
TABLET ORAL
Qty: 40 TABLET | Refills: 0 | Status: SHIPPED | OUTPATIENT
Start: 2017-12-08 | End: 2018-01-09 | Stop reason: SDUPTHER

## 2017-12-11 DIAGNOSIS — N30.01 ACUTE CYSTITIS WITH HEMATURIA: Primary | ICD-10-CM

## 2017-12-11 RX ORDER — NITROFURANTOIN 25; 75 MG/1; MG/1
100 CAPSULE ORAL 2 TIMES DAILY
Qty: 14 CAPSULE | Refills: 0 | Status: SHIPPED | OUTPATIENT
Start: 2017-12-11 | End: 2017-12-18

## 2018-01-03 ENCOUNTER — TELEPHONE (OUTPATIENT)
Dept: UROLOGY | Facility: CLINIC | Age: 38
End: 2018-01-03

## 2018-01-03 NOTE — TELEPHONE ENCOUNTER
----- Message from Jessee Rosales sent at 1/3/2018 12:44 PM CST -----  Contact: [phone] self  Patient is requesting to have a new prescription written for Hydrocodone. Please call patient when it is ready for pickup. Please advise.

## 2018-01-09 ENCOUNTER — TELEPHONE (OUTPATIENT)
Dept: UROLOGY | Facility: CLINIC | Age: 38
End: 2018-01-09

## 2018-01-09 RX ORDER — OXYCODONE AND ACETAMINOPHEN 10; 325 MG/1; MG/1
TABLET ORAL
Qty: 40 TABLET | Refills: 0 | Status: SHIPPED | OUTPATIENT
Start: 2018-01-09 | End: 2018-02-12 | Stop reason: SDUPTHER

## 2018-01-09 NOTE — TELEPHONE ENCOUNTER
----- Message from Jessee Rosales sent at 1/9/2018 12:45 PM CST -----  Contact: [phone] self  Patient is requesting to have a new prescription written for Hydrocodone. Please call patient when it is ready for pickup. Please advise.

## 2018-01-23 ENCOUNTER — TELEPHONE (OUTPATIENT)
Dept: UROLOGY | Facility: CLINIC | Age: 38
End: 2018-01-23

## 2018-01-23 NOTE — TELEPHONE ENCOUNTER
----- Message from Nilam Mays sent at 1/23/2018  4:42 PM CST -----  Contact: [phone]/self  Patient would like to be seen sooner than the next available appointment. Please advise.

## 2018-01-25 ENCOUNTER — PATIENT MESSAGE (OUTPATIENT)
Dept: UROLOGY | Facility: CLINIC | Age: 38
End: 2018-01-25

## 2018-01-25 ENCOUNTER — TELEPHONE (OUTPATIENT)
Dept: UROLOGY | Facility: CLINIC | Age: 38
End: 2018-01-25

## 2018-01-25 NOTE — TELEPHONE ENCOUNTER
----- Message from Jocelyn Sharpe sent at 1/24/2018 10:55 AM CST -----  Contact: Self/ [phone]  Patient called in returning your call.     Please call and advise.

## 2018-01-31 ENCOUNTER — OFFICE VISIT (OUTPATIENT)
Dept: UROLOGY | Facility: CLINIC | Age: 38
End: 2018-01-31
Payer: COMMERCIAL

## 2018-01-31 VITALS — WEIGHT: 226 LBS | BODY MASS INDEX: 38.58 KG/M2 | HEIGHT: 64 IN

## 2018-01-31 DIAGNOSIS — N30.01 ACUTE CYSTITIS WITH HEMATURIA: ICD-10-CM

## 2018-01-31 DIAGNOSIS — R50.9 FEVER AND CHILLS: ICD-10-CM

## 2018-01-31 DIAGNOSIS — N13.5 URETERAL STRICTURE, LEFT: Primary | ICD-10-CM

## 2018-01-31 DIAGNOSIS — R10.9 LEFT FLANK PAIN: ICD-10-CM

## 2018-01-31 DIAGNOSIS — R35.0 URINARY FREQUENCY: ICD-10-CM

## 2018-01-31 DIAGNOSIS — N30.10 INTERSTITIAL CYSTITIS: ICD-10-CM

## 2018-01-31 DIAGNOSIS — R30.0 DYSURIA: ICD-10-CM

## 2018-01-31 PROCEDURE — 87088 URINE BACTERIA CULTURE: CPT

## 2018-01-31 PROCEDURE — 99999 PR PBB SHADOW E&M-EST. PATIENT-LVL III: CPT | Mod: PBBFAC,,, | Performed by: UROLOGY

## 2018-01-31 PROCEDURE — 3008F BODY MASS INDEX DOCD: CPT | Mod: S$GLB,,, | Performed by: UROLOGY

## 2018-01-31 PROCEDURE — 87186 SC STD MICRODIL/AGAR DIL: CPT

## 2018-01-31 PROCEDURE — 87086 URINE CULTURE/COLONY COUNT: CPT

## 2018-01-31 PROCEDURE — 99214 OFFICE O/P EST MOD 30 MIN: CPT | Mod: S$GLB,,, | Performed by: UROLOGY

## 2018-01-31 PROCEDURE — 87077 CULTURE AEROBIC IDENTIFY: CPT

## 2018-01-31 RX ORDER — AMOXICILLIN AND CLAVULANATE POTASSIUM 500; 125 MG/1; MG/1
1 TABLET, FILM COATED ORAL 3 TIMES DAILY
Qty: 42 TABLET | Refills: 0 | Status: SHIPPED | OUTPATIENT
Start: 2018-01-31 | End: 2018-02-14 | Stop reason: SDUPTHER

## 2018-01-31 RX ORDER — SODIUM CHLORIDE 9 MG/ML
INJECTION, SOLUTION INTRAVENOUS CONTINUOUS
Status: CANCELLED | OUTPATIENT
Start: 2018-01-31

## 2018-01-31 RX ORDER — OXYCODONE AND ACETAMINOPHEN 10; 325 MG/1; MG/1
1 TABLET ORAL EVERY 6 HOURS PRN
Qty: 30 TABLET | Refills: 0 | Status: SHIPPED | OUTPATIENT
Start: 2018-01-31 | End: 2018-02-23 | Stop reason: SDUPTHER

## 2018-01-31 NOTE — PROGRESS NOTES
Subjective:       Patient ID: Lesa Leyva is a 37 y.o. female.    Chief Complaint: Urinary Tract Infection and Other (stent problems)    36 yo WF with  History of IC, Left ureteral stricture. Chronic worsening Left Flank Pain radiating to side and Abdomen. Fever and chills with nausea and no vomitting x 4 days.      Urinary Tract Infection    This is a recurrent problem. The current episode started more than 1 year ago. The problem occurs every urination. The problem has been gradually worsening. The quality of the pain is described as burning and stabbing. The pain is at a severity of 7/10. The pain is moderate. The maximum temperature recorded prior to her arrival was 100 - 100.9 F. The fever has been present for 3 - 4 days. She is sexually active. There is a history of pyelonephritis. Associated symptoms include chills, frequency, hematuria, hesitancy, nausea, sweats and urgency. Pertinent negatives include no behavior changes, discharge, flank pain, possible pregnancy, vomiting, weight loss, bubble bath use, constipation, rash or withholding. She has tried NSAIDs for the symptoms. The treatment provided no relief. Her past medical history is significant for recurrent UTIs and a urological procedure. There is no history of catheterization, diabetes insipidus, diabetes mellitus, genitourinary reflux, hypertension, kidney stones, a single kidney, STD or urinary stasis.   Flank Pain   This is a chronic (left) problem. The current episode started more than 1 year ago. The problem occurs constantly. The problem has been gradually worsening since onset. The pain is present in the costovertebral angle. The quality of the pain is described as stabbing. Radiates to: Left side and lower abdomen. The pain is at a severity of 7/10. The pain is moderate. The pain is the same all the time. The symptoms are aggravated by sitting, standing and urinating. Associated symptoms include abdominal pain and dysuria. Pertinent  negatives include no bladder incontinence, bowel incontinence, chest pain, fever, headaches, leg pain, numbness, paresis, paresthesias, pelvic pain, perianal numbness, tingling, weakness or weight loss. Risk factors include sedentary lifestyle, lack of exercise and obesity. She has tried NSAIDs and heat for the symptoms. The treatment provided no relief.     Review of Systems   Constitutional: Positive for chills. Negative for activity change, appetite change, fatigue, fever and weight loss.   HENT: Negative for congestion, ear pain, hearing loss, nosebleeds, sinus pressure, sore throat and trouble swallowing.    Eyes: Negative for pain and visual disturbance.   Respiratory: Negative for apnea, cough and shortness of breath.    Cardiovascular: Negative for chest pain and leg swelling.   Gastrointestinal: Positive for abdominal pain and nausea. Negative for abdominal distention, anal bleeding, blood in stool, bowel incontinence, constipation, diarrhea, rectal pain and vomiting.   Endocrine: Negative for cold intolerance, heat intolerance, polydipsia, polyphagia and polyuria.   Genitourinary: Positive for dysuria, frequency, hematuria, hesitancy and urgency. Negative for bladder incontinence, decreased urine volume, difficulty urinating, dyspareunia, enuresis, flank pain, genital sores, menstrual problem, pelvic pain, vaginal bleeding, vaginal discharge and vaginal pain.   Musculoskeletal: Negative for arthralgias and back pain.   Skin: Negative for color change, pallor and rash.   Allergic/Immunologic: Negative for environmental allergies, food allergies and immunocompromised state.   Neurological: Negative for dizziness, tingling, speech difficulty, weakness, numbness, headaches and paresthesias.   Hematological: Negative for adenopathy. Does not bruise/bleed easily.   Psychiatric/Behavioral: Negative.        Objective:      Physical Exam   Nursing note and vitals reviewed.  Constitutional: She is oriented to  person, place, and time. She appears well-developed and well-nourished.   HENT:   Head: Normocephalic.   Nose: Nose normal.   Mouth/Throat: Oropharynx is clear and moist.   Eyes: Conjunctivae and EOM are normal. Pupils are equal, round, and reactive to light.   Neck: Normal range of motion. Neck supple.   Cardiovascular: Normal rate, regular rhythm, normal heart sounds and intact distal pulses.    Pulmonary/Chest: Effort normal and breath sounds normal.   Abdominal: Soft. Bowel sounds are normal.   Genitourinary:   Genitourinary Comments: Left CVAT   Musculoskeletal: Normal range of motion.   Neurological: She is alert and oriented to person, place, and time. She has normal reflexes.   Skin: Skin is warm and dry.     Psychiatric: She has a normal mood and affect. Her behavior is normal. Judgment and thought content normal.       Assessment:       1. Ureteral stricture, left    2. Dysuria    3. Acute cystitis with hematuria    4. Interstitial cystitis    5. Urinary frequency    6. Left flank pain    7. Fever and chills        Plan:       Patient Instructions   Augmentin 3 times daily for 2 weeks  Cysto and Stent Change on 2/15/18  F/U 6 weeks  Percocet for pain

## 2018-01-31 NOTE — PATIENT INSTRUCTIONS
Augmentin 3 times daily for 2 weeks  Cysto and Stent Change on 2/15/18  F/U 6 weeks  Percocet for pain

## 2018-02-02 ENCOUNTER — TELEPHONE (OUTPATIENT)
Dept: UROLOGY | Facility: CLINIC | Age: 38
End: 2018-02-02

## 2018-02-02 NOTE — TELEPHONE ENCOUNTER
----- Message from Angel Hernandez MD sent at 2/2/2018 11:49 AM CST -----  You have a UTI, waiting on culture. Take antibiotics as described.

## 2018-02-03 LAB — BACTERIA UR CULT: NORMAL

## 2018-02-14 RX ORDER — AMOXICILLIN AND CLAVULANATE POTASSIUM 500; 125 MG/1; MG/1
1 TABLET, FILM COATED ORAL 3 TIMES DAILY
Qty: 42 TABLET | Refills: 0 | Status: SHIPPED | OUTPATIENT
Start: 2018-02-14 | End: 2018-02-28

## 2018-02-19 NOTE — TELEPHONE ENCOUNTER
----- Message from Angel Hernandez MD sent at 9/14/2017  2:32 PM CDT -----  UTI present, Awaiting culture. Continue antibiotics.   No

## 2018-02-22 ENCOUNTER — PATIENT MESSAGE (OUTPATIENT)
Dept: UROLOGY | Facility: CLINIC | Age: 38
End: 2018-02-22

## 2018-02-23 ENCOUNTER — CLINICAL SUPPORT (OUTPATIENT)
Dept: UROLOGY | Facility: CLINIC | Age: 38
End: 2018-02-23
Payer: COMMERCIAL

## 2018-02-23 DIAGNOSIS — N39.0 URINARY TRACT INFECTION WITHOUT HEMATURIA, SITE UNSPECIFIED: Primary | ICD-10-CM

## 2018-02-23 PROCEDURE — 87086 URINE CULTURE/COLONY COUNT: CPT

## 2018-02-23 PROCEDURE — 81001 URINALYSIS AUTO W/SCOPE: CPT

## 2018-02-23 PROCEDURE — 87186 SC STD MICRODIL/AGAR DIL: CPT | Mod: 59

## 2018-02-23 PROCEDURE — 87088 URINE BACTERIA CULTURE: CPT

## 2018-02-23 PROCEDURE — 87077 CULTURE AEROBIC IDENTIFY: CPT

## 2018-02-23 PROCEDURE — 99999 PR PBB SHADOW E&M-EST. PATIENT-LVL I: CPT | Mod: PBBFAC,,,

## 2018-02-23 RX ORDER — OXYCODONE AND ACETAMINOPHEN 10; 325 MG/1; MG/1
1 TABLET ORAL EVERY 6 HOURS PRN
Qty: 30 TABLET | Refills: 0 | Status: SHIPPED | OUTPATIENT
Start: 2018-02-23 | End: 2018-03-12 | Stop reason: SDUPTHER

## 2018-02-24 LAB
BACTERIA #/AREA URNS AUTO: ABNORMAL /HPF
BILIRUB UR QL STRIP: NEGATIVE
CLARITY UR REFRACT.AUTO: ABNORMAL
COLOR UR AUTO: YELLOW
GLUCOSE UR QL STRIP: NEGATIVE
HGB UR QL STRIP: ABNORMAL
HYALINE CASTS UR QL AUTO: 0 /LPF
KETONES UR QL STRIP: NEGATIVE
LEUKOCYTE ESTERASE UR QL STRIP: ABNORMAL
MICROSCOPIC COMMENT: ABNORMAL
NITRITE UR QL STRIP: POSITIVE
PH UR STRIP: 6 [PH] (ref 5–8)
PROT UR QL STRIP: ABNORMAL
RBC #/AREA URNS AUTO: 47 /HPF (ref 0–4)
SP GR UR STRIP: 1.01 (ref 1–1.03)
SQUAMOUS #/AREA URNS AUTO: 5 /HPF
URN SPEC COLLECT METH UR: ABNORMAL
UROBILINOGEN UR STRIP-ACNC: NEGATIVE EU/DL
WBC #/AREA URNS AUTO: >100 /HPF (ref 0–5)
WBC CLUMPS UR QL AUTO: ABNORMAL

## 2018-02-28 ENCOUNTER — TELEPHONE (OUTPATIENT)
Dept: UROLOGY | Facility: CLINIC | Age: 38
End: 2018-02-28

## 2018-02-28 LAB
BACTERIA UR CULT: NORMAL
BACTERIA UR CULT: NORMAL

## 2018-02-28 NOTE — TELEPHONE ENCOUNTER
----- Message from Angel Hernandez MD sent at 2/28/2018 11:39 AM CST -----  UTI is sensitive to Augmentin so continue meds till finished.

## 2018-03-13 RX ORDER — OXYCODONE AND ACETAMINOPHEN 10; 325 MG/1; MG/1
1 TABLET ORAL EVERY 6 HOURS PRN
Qty: 30 TABLET | Refills: 0 | Status: SHIPPED | OUTPATIENT
Start: 2018-03-13 | End: 2018-03-26 | Stop reason: SDUPTHER

## 2018-03-21 ENCOUNTER — HOSPITAL ENCOUNTER (EMERGENCY)
Facility: HOSPITAL | Age: 38
Discharge: HOME | End: 2018-03-21
Attending: EMERGENCY MEDICINE
Payer: COMMERCIAL

## 2018-03-21 VITALS
HEART RATE: 102 BPM | OXYGEN SATURATION: 100 % | RESPIRATION RATE: 18 BRPM | HEIGHT: 64 IN | TEMPERATURE: 98 F | DIASTOLIC BLOOD PRESSURE: 76 MMHG | BODY MASS INDEX: 35.85 KG/M2 | WEIGHT: 210 LBS | SYSTOLIC BLOOD PRESSURE: 118 MMHG

## 2018-03-21 DIAGNOSIS — N10 PYELONEPHRITIS, ACUTE: Primary | ICD-10-CM

## 2018-03-21 LAB
ALBUMIN SERPL BCP-MCNC: 3.7 G/DL
ALP SERPL-CCNC: 118 U/L
ALT SERPL W/O P-5'-P-CCNC: 11 U/L
ANION GAP SERPL CALC-SCNC: 10 MMOL/L
AST SERPL-CCNC: 15 U/L
B-HCG UR QL: NEGATIVE
BACTERIA #/AREA URNS HPF: ABNORMAL /HPF
BASOPHILS # BLD AUTO: 0.03 K/UL
BASOPHILS NFR BLD: 0.2 %
BILIRUB SERPL-MCNC: 0.4 MG/DL
BILIRUB UR QL STRIP: NEGATIVE
BUN SERPL-MCNC: 10 MG/DL
CALCIUM SERPL-MCNC: 9.9 MG/DL
CHLORIDE SERPL-SCNC: 100 MMOL/L
CLARITY UR: CLEAR
CO2 SERPL-SCNC: 24 MMOL/L
COLOR UR: YELLOW
CREAT SERPL-MCNC: 0.8 MG/DL
CTP QC/QA: YES
DIFFERENTIAL METHOD: ABNORMAL
EOSINOPHIL # BLD AUTO: 0 K/UL
EOSINOPHIL NFR BLD: 0.1 %
ERYTHROCYTE [DISTWIDTH] IN BLOOD BY AUTOMATED COUNT: 16.2 %
EST. GFR  (AFRICAN AMERICAN): >60 ML/MIN/1.73 M^2
EST. GFR  (NON AFRICAN AMERICAN): >60 ML/MIN/1.73 M^2
GLUCOSE SERPL-MCNC: 96 MG/DL
GLUCOSE UR QL STRIP: NEGATIVE
HCT VFR BLD AUTO: 34.5 %
HGB BLD-MCNC: 11 G/DL
HGB UR QL STRIP: ABNORMAL
KETONES UR QL STRIP: ABNORMAL
LEUKOCYTE ESTERASE UR QL STRIP: ABNORMAL
LYMPHOCYTES # BLD AUTO: 2.2 K/UL
LYMPHOCYTES NFR BLD: 13.9 %
MCH RBC QN AUTO: 25.3 PG
MCHC RBC AUTO-ENTMCNC: 31.9 G/DL
MCV RBC AUTO: 79 FL
MICROSCOPIC COMMENT: ABNORMAL
MONOCYTES # BLD AUTO: 1.8 K/UL
MONOCYTES NFR BLD: 11.5 %
NEUTROPHILS # BLD AUTO: 11.5 K/UL
NEUTROPHILS NFR BLD: 74 %
NITRITE UR QL STRIP: NEGATIVE
PH UR STRIP: 6 [PH] (ref 5–8)
PLATELET # BLD AUTO: 273 K/UL
PMV BLD AUTO: 10.6 FL
POTASSIUM SERPL-SCNC: 3.5 MMOL/L
PROT SERPL-MCNC: 8.3 G/DL
PROT UR QL STRIP: ABNORMAL
RBC # BLD AUTO: 4.35 M/UL
RBC #/AREA URNS HPF: 6 /HPF (ref 0–4)
SODIUM SERPL-SCNC: 134 MMOL/L
SP GR UR STRIP: 1.01 (ref 1–1.03)
SQUAMOUS #/AREA URNS HPF: ABNORMAL /HPF
URN SPEC COLLECT METH UR: ABNORMAL
UROBILINOGEN UR STRIP-ACNC: NEGATIVE EU/DL
WBC # BLD AUTO: 15.59 K/UL
WBC #/AREA URNS HPF: 30 /HPF (ref 0–5)

## 2018-03-21 PROCEDURE — 99284 EMERGENCY DEPT VISIT MOD MDM: CPT | Mod: 25

## 2018-03-21 PROCEDURE — 80053 COMPREHEN METABOLIC PANEL: CPT

## 2018-03-21 PROCEDURE — 63600175 PHARM REV CODE 636 W HCPCS: Performed by: EMERGENCY MEDICINE

## 2018-03-21 PROCEDURE — 25000003 PHARM REV CODE 250: Performed by: EMERGENCY MEDICINE

## 2018-03-21 PROCEDURE — 96374 THER/PROPH/DIAG INJ IV PUSH: CPT

## 2018-03-21 PROCEDURE — 96372 THER/PROPH/DIAG INJ SC/IM: CPT

## 2018-03-21 PROCEDURE — 85025 COMPLETE CBC W/AUTO DIFF WBC: CPT

## 2018-03-21 PROCEDURE — 63600175 PHARM REV CODE 636 W HCPCS: Performed by: NURSE PRACTITIONER

## 2018-03-21 PROCEDURE — 96361 HYDRATE IV INFUSION ADD-ON: CPT

## 2018-03-21 PROCEDURE — 81000 URINALYSIS NONAUTO W/SCOPE: CPT

## 2018-03-21 RX ORDER — PHENAZOPYRIDINE HYDROCHLORIDE 100 MG/1
200 TABLET, FILM COATED ORAL
Status: COMPLETED | OUTPATIENT
Start: 2018-03-21 | End: 2018-03-21

## 2018-03-21 RX ORDER — PHENAZOPYRIDINE HYDROCHLORIDE 200 MG/1
200 TABLET, FILM COATED ORAL 3 TIMES DAILY
Qty: 6 TABLET | Refills: 0 | Status: SHIPPED | OUTPATIENT
Start: 2018-03-21 | End: 2018-03-21

## 2018-03-21 RX ORDER — CIPROFLOXACIN 500 MG/1
500 TABLET ORAL 2 TIMES DAILY
Qty: 14 TABLET | Refills: 0 | Status: SHIPPED | OUTPATIENT
Start: 2018-03-21 | End: 2018-03-31

## 2018-03-21 RX ORDER — ONDANSETRON 4 MG/1
4 TABLET, ORALLY DISINTEGRATING ORAL
Status: COMPLETED | OUTPATIENT
Start: 2018-03-21 | End: 2018-03-21

## 2018-03-21 RX ORDER — KETOROLAC TROMETHAMINE 30 MG/ML
30 INJECTION, SOLUTION INTRAMUSCULAR; INTRAVENOUS
Status: COMPLETED | OUTPATIENT
Start: 2018-03-21 | End: 2018-03-21

## 2018-03-21 RX ORDER — PHENAZOPYRIDINE HYDROCHLORIDE 200 MG/1
200 TABLET, FILM COATED ORAL 3 TIMES DAILY
Qty: 6 TABLET | Refills: 0 | Status: SHIPPED | OUTPATIENT
Start: 2018-03-21 | End: 2018-03-31

## 2018-03-21 RX ORDER — CEFTRIAXONE 1 G/1
1 INJECTION, POWDER, FOR SOLUTION INTRAMUSCULAR; INTRAVENOUS
Status: COMPLETED | OUTPATIENT
Start: 2018-03-21 | End: 2018-03-21

## 2018-03-21 RX ORDER — ONDANSETRON 4 MG/1
4 TABLET, ORALLY DISINTEGRATING ORAL EVERY 6 HOURS PRN
Qty: 10 TABLET | Refills: 0 | Status: SHIPPED | OUTPATIENT
Start: 2018-03-21 | End: 2019-01-04

## 2018-03-21 RX ORDER — DICYCLOMINE HYDROCHLORIDE 10 MG/ML
20 INJECTION INTRAMUSCULAR
Status: COMPLETED | OUTPATIENT
Start: 2018-03-21 | End: 2018-03-21

## 2018-03-21 RX ADMIN — DICYCLOMINE HYDROCHLORIDE 20 MG: 20 INJECTION, SOLUTION INTRAMUSCULAR at 08:03

## 2018-03-21 RX ADMIN — SODIUM CHLORIDE 1000 ML: 0.9 INJECTION, SOLUTION INTRAVENOUS at 06:03

## 2018-03-21 RX ADMIN — CEFTRIAXONE SODIUM 1 G: 1 INJECTION, POWDER, FOR SOLUTION INTRAMUSCULAR; INTRAVENOUS at 08:03

## 2018-03-21 RX ADMIN — PHENAZOPYRIDINE HYDROCHLORIDE 200 MG: 100 TABLET ORAL at 08:03

## 2018-03-21 RX ADMIN — KETOROLAC TROMETHAMINE 30 MG: 30 INJECTION, SOLUTION INTRAMUSCULAR at 04:03

## 2018-03-21 RX ADMIN — ONDANSETRON 4 MG: 4 TABLET, ORALLY DISINTEGRATING ORAL at 04:03

## 2018-03-21 NOTE — ED PROVIDER NOTES
Encounter Date: 3/21/2018       History     Chief Complaint   Patient presents with    Flank Pain     c/o bilateral flank pain, nausea, and vomiting since last night. Also c/o pain to right side and RLQ. History of kidney stones and stent in left kidney     Pt is a 38-year-old with pmhx of hysterectomy, anxiety, depression, left ureteral stent  ovarian cyst, neuropathy, and bladder lift who presents today with RLQ pain that radiates to her R lower back and L lower back pain that radiates to her LLQ since yesterday. She describes the pain as intermittent and sharp. Pt associates fever, chills, N/V, dysuria, and hematuria with her pain. Pt reports a fever as high as 101.7 this am. Pt reports that she has hx of kidney stones and UTI. Pt states that she last saw her urologist last month and they replaced her left ureteral stent. Pt reports that she took an oxycodone this am around 6:30 and it helped with her pain. Pt denies SOB and chest pain. Pt denies any other complaints at this time.       The history is provided by the patient.     Review of patient's allergies indicates:  No Known Allergies  Past Medical History:   Diagnosis Date    Anxiety     Cystitis     Depression     Neuropathy of both feet     Neuropathy of hand     Personal history of kidney stones 4/2012    has had several kidney stones and has bladder spasms    Urinary tract infection     Vaginal infection      Past Surgical History:   Procedure Laterality Date    BLADDER SUSPENSION  2013    bladder lifts    CYSTOSCOPY W/ URETERAL STENT PLACEMENT Left 3/2015    HYSTERECTOMY      Partial - still has ovaries    KNEE SURGERY Left     lithotripsy  4/2012    OVARIAN CYST SURGERY  2006     Family History   Problem Relation Age of Onset    No Known Problems Father     Hypertension Mother     Seizures Mother     Fibromyalgia Mother     No Known Problems Sister     Seizures Brother     Seizures Brother     No Known Problems Sister     No  Known Problems Sister     Cancer Neg Hx     Heart disease Neg Hx     Kidney disease Neg Hx      Social History   Substance Use Topics    Smoking status: Never Smoker    Smokeless tobacco: Never Used    Alcohol use No     Review of Systems   Constitutional: Positive for appetite change, chills and fever.   Respiratory: Negative for cough and shortness of breath.    Cardiovascular: Negative for chest pain.   Gastrointestinal: Positive for abdominal pain, nausea and vomiting. Negative for abdominal distention and diarrhea.   Genitourinary: Positive for dysuria and hematuria. Negative for flank pain, pelvic pain, urgency, vaginal discharge and vaginal pain.   Musculoskeletal: Positive for back pain.   Skin: Negative for rash.   Neurological: Negative for dizziness, weakness and headaches.   Hematological: Does not bruise/bleed easily.   All other systems reviewed and are negative.      Physical Exam     Initial Vitals   BP Pulse Resp Temp SpO2   03/21/18 1545 03/21/18 1545 03/21/18 1545 03/21/18 1801 03/21/18 1545   128/80 (!) 133 (!) 22 98.4 °F (36.9 °C) 100 %      MAP       03/21/18 1545       96         Physical Exam    Nursing note and vitals reviewed.  Constitutional: She appears well-developed and well-nourished. She is not diaphoretic.  Non-toxic appearance. She does not have a sickly appearance.   HENT:   Head: Normocephalic.   Eyes: Conjunctivae are normal.   Neck: Normal range of motion and phonation normal.   Cardiovascular: Regular rhythm and normal heart sounds. Tachycardia present.    Pulses:       Radial pulses are 2+ on the right side, and 2+ on the left side.   Pulmonary/Chest: Effort normal and breath sounds normal. No respiratory distress.   Abdominal: Soft. Bowel sounds are normal. She exhibits no distension and no mass. There is tenderness in the right lower quadrant and left lower quadrant. There is CVA tenderness and tenderness at McBurney's point. There is no rigidity, no rebound and no  guarding.   Musculoskeletal: Normal range of motion.   Neurological: She is alert and oriented to person, place, and time. Gait normal.   Skin: Skin is warm, dry and intact. Capillary refill takes less than 2 seconds. No rash noted.   Psychiatric: She has a normal mood and affect. Judgment normal. Cognition and memory are normal.         ED Course   Procedures  Labs Reviewed   CBC W/ AUTO DIFFERENTIAL - Abnormal; Notable for the following:        Result Value    WBC 15.59 (*)     Hemoglobin 11.0 (*)     Hematocrit 34.5 (*)     MCV 79 (*)     MCH 25.3 (*)     MCHC 31.9 (*)     RDW 16.2 (*)     Gran # (ANC) 11.5 (*)     Mono # 1.8 (*)     Gran% 74.0 (*)     Lymph% 13.9 (*)     All other components within normal limits   COMPREHENSIVE METABOLIC PANEL - Abnormal; Notable for the following:     Sodium 134 (*)     All other components within normal limits   URINALYSIS - Abnormal; Notable for the following:     Protein, UA Trace (*)     Ketones, UA 1+ (*)     Occult Blood UA 2+ (*)     Leukocytes, UA 1+ (*)     All other components within normal limits   URINALYSIS MICROSCOPIC - Abnormal; Notable for the following:     RBC, UA 6 (*)     WBC, UA 30 (*)     All other components within normal limits         Imaging Results          CT Renal Stone Study ABD Pelvis WO (Final result)  Result time 03/21/18 20:09:17    Final result by Nadeem Doss MD (03/21/18 20:09:17)                 Impression:      1. Bilateral nonobstructing nephrolithiasis, no acute CT abnormality to correlate with right lower quadrant pain.  2. Left ureteral stent noting mild left hydronephrosis.  There is left perinephric inflammation and left periaortic lymphadenopathy, worsened since the previous exam.  There is mild urothelial thickening of the left renal collecting system.  Findings may reflect infection, correlation with urinalysis is recommended.  3. Several additional findings above.      Electronically signed by: Nadeem Doss  MD  Date:    03/21/2018  Time:    20:09             Narrative:    EXAMINATION:  CT RENAL STONE STUDY ABD PELVIS WO    CLINICAL HISTORY:  Abdominal pain, unspecified;RLQ pain, r/o appendicitis versus kidney stone;    TECHNIQUE:  Low dose axial images, sagittal and coronal reformations were obtained from the lung bases to the pubic symphysis.  Contrast was not administered.    COMPARISON:  10/04/2016    FINDINGS:  Images of the lower thorax are remarkable for minimal dependent atelectasis.    The liver, spleen, pancreas, gallbladder and adrenal glands have a grossly unremarkable noncontrast appearance.  There is no biliary dilation or ascites.  Scattered shotty mildly prominent periaortic and paracaval lymph nodes are noted, particularly along the right periaortic region, worsened since the previous examination, largest short axis conglomeration of lymph nodes measures up to 1.2 cm.    There is right nonobstructing nephrolithiasis.  The right ureter is unremarkable without definite calculi seen.  There is left mild to moderate hydroureteronephrosis, improved since the previous exam noting ureteral stent catheter in place.  There is nonobstructive left nephrolithiasis.  There is urothelial thickening of the proximal left renal collecting system, correlation with urinalysis is advised to exclude infection.  No definite calculi along the course of the left ureteral stent.  The urinary bladder is grossly unremarkable.  The uterus is absent the adnexa is unremarkable.    There are a few scattered colonic diverticula without inflammation.  The terminal ileum is unremarkable.  The appendix is unremarkable.  The small bowel is grossly unremarkable.  No focal organized pelvic fluid collection.    Degenerative changes are noted of the lower lumbar spine without focal osseous destructive process.  No significant inguinal lymphadenopathy.                                   Medical Decision Making:   Initial Assessment:   Pt is a  38-year-old female with pmhx of hysterectomy, anxiety, depression, left ureteral stent, ovarian cyst, neuropathy, and bladder lift who presents today with RLQ pain that radiates to her R lower back and L lower back pain that radiates to her LLQ since yesterday. Pt appear well, non-toxic. Tenderness noted upon palpation to RLQ and LLQ without guarding. Bilateral CVA tenderness. Positive peritoneal signs. Normal bowel sounds. Based on this, CT ordered to r/o appendicitis versus kidney stone.   Differential Diagnosis:   UTI, pyelonephritis, kidney stone, appendicitis   Clinical Tests:   Lab Tests: Ordered and Reviewed  Radiological Study: Ordered and Reviewed  ED Management:  Labs, zofran, toradol, bentyl, rocephin, fluids, urinalysis, Ct, IV   CT without acute appendicitis or ureteral stones.  CBC revealed elevated WBC's and urinalysis revealed leukocytes and blood. Based on pt's elevated WBC's, leukocytes in urinalysis, pt's s/s of fever, chills, N/V, and CVA tenderness,  pt will be treated for pyelonephritis. Pt given rocephin in emergency department and will be d/c home with prescriptions for pyridium and cipro. Pt instructed to take all of prescribed antibiotic and to hydrate with oral fluids. Pt instructed to f/u with PCP within 2-3 days and to return if symptoms worsen or change. Pt verbalized d/c instructions and is in compliance and agreement with tx plan.    Rx: cipro and pyridium, zofran odt              Attending Attestation:     Physician Attestation Statement for NP/PA:   I have conducted a face to face encounter with this patient in addition to the NP/PA, due to NP/PA Request    Other NP/PA Attestation Additions:    History of Present Illness: 38F with bilateral flank and bilateral lower abdominal pain.  Hx of stones and ureteral stent.      Medical Decision Making: RLQ ttp on exam.  Labs with leukocytosis and UTI.  CT shows normal appendix, no ureteral stone, inflammation of ureter.  Will treat for  pyelo.                    Clinical Impression:   The encounter diagnosis was Pyelonephritis, acute.                           Yonathan Rust NP  03/21/18 2050       Emiliana Artis MD  03/21/18 2058

## 2018-03-22 NOTE — DISCHARGE INSTRUCTIONS
Please take all of your prescribed antibiotics. Hydrate with oral fluids. Follow-up with your PCP within 2-3 days. Return if symptoms worsen or change.

## 2018-03-22 NOTE — ED NOTES
Pt presents to the ED w/ c/o of having bilateral flank pain since yesterday that radiates to her RLQ abd. Pt reports pain as sharp. Pt reports n/v associated.

## 2018-03-28 ENCOUNTER — PATIENT MESSAGE (OUTPATIENT)
Dept: UROLOGY | Facility: CLINIC | Age: 38
End: 2018-03-28

## 2018-03-28 RX ORDER — METHENAMINE, SODIUM PHOSPHATE, MONOBASIC, MONOHYDRATE, PHENYL SALICYLATE, METHYLENE BLUE, AND HYOSCYAMINE SULFATE 118; 40.8; 36; 10; .12 MG/1; MG/1; MG/1; MG/1; MG/1
1 CAPSULE ORAL 4 TIMES DAILY PRN
Qty: 40 CAPSULE | Refills: 12 | Status: SHIPPED | OUTPATIENT
Start: 2018-03-28 | End: 2019-01-04

## 2018-03-28 RX ORDER — OXYCODONE AND ACETAMINOPHEN 10; 325 MG/1; MG/1
1 TABLET ORAL EVERY 6 HOURS PRN
Qty: 30 TABLET | Refills: 0 | Status: SHIPPED | OUTPATIENT
Start: 2018-03-28 | End: 2018-04-09 | Stop reason: SDUPTHER

## 2018-04-10 RX ORDER — OXYCODONE AND ACETAMINOPHEN 10; 325 MG/1; MG/1
1 TABLET ORAL EVERY 6 HOURS PRN
Qty: 30 TABLET | Refills: 0 | Status: SHIPPED | OUTPATIENT
Start: 2018-04-10 | End: 2018-04-24 | Stop reason: SDUPTHER

## 2018-04-19 ENCOUNTER — PATIENT MESSAGE (OUTPATIENT)
Dept: UROLOGY | Facility: CLINIC | Age: 38
End: 2018-04-19

## 2018-04-19 ENCOUNTER — TELEPHONE (OUTPATIENT)
Dept: UROLOGY | Facility: CLINIC | Age: 38
End: 2018-04-19

## 2018-04-19 DIAGNOSIS — N39.0 URINARY TRACT INFECTION WITHOUT HEMATURIA, SITE UNSPECIFIED: Primary | ICD-10-CM

## 2018-04-20 ENCOUNTER — LAB VISIT (OUTPATIENT)
Dept: LAB | Facility: HOSPITAL | Age: 38
End: 2018-04-20
Attending: UROLOGY
Payer: COMMERCIAL

## 2018-04-20 DIAGNOSIS — N39.0 URINARY TRACT INFECTION WITHOUT HEMATURIA, SITE UNSPECIFIED: ICD-10-CM

## 2018-04-20 LAB
BACTERIA #/AREA URNS AUTO: ABNORMAL /HPF
BILIRUB UR QL STRIP: NEGATIVE
CLARITY UR REFRACT.AUTO: ABNORMAL
COLOR UR AUTO: YELLOW
GLUCOSE UR QL STRIP: NEGATIVE
HGB UR QL STRIP: ABNORMAL
HYALINE CASTS UR QL AUTO: 2 /LPF
KETONES UR QL STRIP: NEGATIVE
LEUKOCYTE ESTERASE UR QL STRIP: ABNORMAL
MICROSCOPIC COMMENT: ABNORMAL
NITRITE UR QL STRIP: NEGATIVE
PH UR STRIP: 6 [PH] (ref 5–8)
PROT UR QL STRIP: ABNORMAL
RBC #/AREA URNS AUTO: 3 /HPF (ref 0–4)
SP GR UR STRIP: 1.02 (ref 1–1.03)
URN SPEC COLLECT METH UR: ABNORMAL
UROBILINOGEN UR STRIP-ACNC: NEGATIVE EU/DL
WBC #/AREA URNS AUTO: 100 /HPF (ref 0–5)

## 2018-04-20 PROCEDURE — 87086 URINE CULTURE/COLONY COUNT: CPT | Mod: PO

## 2018-04-20 PROCEDURE — 81000 URINALYSIS NONAUTO W/SCOPE: CPT | Mod: PO

## 2018-04-22 LAB
BACTERIA UR CULT: NORMAL
BACTERIA UR CULT: NORMAL

## 2018-04-23 ENCOUNTER — TELEPHONE (OUTPATIENT)
Dept: UROLOGY | Facility: CLINIC | Age: 38
End: 2018-04-23

## 2018-04-23 NOTE — TELEPHONE ENCOUNTER
----- Message from Ronna Holcomb sent at 4/23/2018  3:43 PM CDT -----  Contact: [phone]/self  Pt states she's returning your call  Please call and advise

## 2018-04-23 NOTE — TELEPHONE ENCOUNTER
----- Message from Angel Hernandez MD sent at 4/23/2018 11:58 AM CDT -----  Need a catheterized specimen labelled to work all organisms.

## 2018-04-24 ENCOUNTER — CLINICAL SUPPORT (OUTPATIENT)
Dept: UROLOGY | Facility: CLINIC | Age: 38
End: 2018-04-24
Payer: COMMERCIAL

## 2018-04-24 DIAGNOSIS — N39.0 URINARY TRACT INFECTION WITHOUT HEMATURIA, SITE UNSPECIFIED: Primary | ICD-10-CM

## 2018-04-24 LAB
BACTERIA #/AREA URNS AUTO: ABNORMAL /HPF
BILIRUB UR QL STRIP: NEGATIVE
CLARITY UR REFRACT.AUTO: ABNORMAL
COLOR UR AUTO: YELLOW
GLUCOSE UR QL STRIP: NEGATIVE
HGB UR QL STRIP: ABNORMAL
HYALINE CASTS UR QL AUTO: 0 /LPF
KETONES UR QL STRIP: NEGATIVE
LEUKOCYTE ESTERASE UR QL STRIP: ABNORMAL
MICROSCOPIC COMMENT: ABNORMAL
NITRITE UR QL STRIP: NEGATIVE
PH UR STRIP: 6 [PH] (ref 5–8)
PROT UR QL STRIP: ABNORMAL
RBC #/AREA URNS AUTO: 65 /HPF (ref 0–4)
SP GR UR STRIP: 1.01 (ref 1–1.03)
SQUAMOUS #/AREA URNS AUTO: 2 /HPF
URN SPEC COLLECT METH UR: ABNORMAL
UROBILINOGEN UR STRIP-ACNC: NEGATIVE EU/DL
WBC #/AREA URNS AUTO: >100 /HPF (ref 0–5)

## 2018-04-24 PROCEDURE — 81001 URINALYSIS AUTO W/SCOPE: CPT

## 2018-04-24 PROCEDURE — 87086 URINE CULTURE/COLONY COUNT: CPT

## 2018-04-24 RX ORDER — OXYCODONE AND ACETAMINOPHEN 10; 325 MG/1; MG/1
1 TABLET ORAL EVERY 6 HOURS PRN
Qty: 30 TABLET | Refills: 0 | Status: SHIPPED | OUTPATIENT
Start: 2018-04-24 | End: 2018-05-04 | Stop reason: SDUPTHER

## 2018-04-26 LAB — BACTERIA UR CULT: NO GROWTH

## 2018-04-29 ENCOUNTER — PATIENT MESSAGE (OUTPATIENT)
Dept: UROLOGY | Facility: CLINIC | Age: 38
End: 2018-04-29

## 2018-05-03 ENCOUNTER — PATIENT MESSAGE (OUTPATIENT)
Dept: UROLOGY | Facility: CLINIC | Age: 38
End: 2018-05-03

## 2018-05-04 RX ORDER — OXYCODONE AND ACETAMINOPHEN 10; 325 MG/1; MG/1
1 TABLET ORAL EVERY 6 HOURS PRN
Qty: 30 TABLET | Refills: 0 | Status: SHIPPED | OUTPATIENT
Start: 2018-05-04 | End: 2018-05-15 | Stop reason: ALTCHOICE

## 2018-05-15 ENCOUNTER — OFFICE VISIT (OUTPATIENT)
Dept: UROLOGY | Facility: CLINIC | Age: 38
End: 2018-05-15
Payer: COMMERCIAL

## 2018-05-15 VITALS
HEIGHT: 64 IN | RESPIRATION RATE: 18 BRPM | SYSTOLIC BLOOD PRESSURE: 120 MMHG | DIASTOLIC BLOOD PRESSURE: 83 MMHG | WEIGHT: 210 LBS | OXYGEN SATURATION: 98 % | HEART RATE: 112 BPM | BODY MASS INDEX: 35.85 KG/M2

## 2018-05-15 DIAGNOSIS — N13.5 URETERAL STRICTURE, LEFT: ICD-10-CM

## 2018-05-15 DIAGNOSIS — R35.0 URINARY FREQUENCY: ICD-10-CM

## 2018-05-15 DIAGNOSIS — N30.80 CYSTITIS CYSTICA: ICD-10-CM

## 2018-05-15 DIAGNOSIS — R30.0 DYSURIA: ICD-10-CM

## 2018-05-15 DIAGNOSIS — R10.9 LEFT FLANK PAIN: Primary | ICD-10-CM

## 2018-05-15 PROCEDURE — 3008F BODY MASS INDEX DOCD: CPT | Mod: CPTII,S$GLB,, | Performed by: UROLOGY

## 2018-05-15 PROCEDURE — 99999 PR PBB SHADOW E&M-EST. PATIENT-LVL IV: CPT | Mod: PBBFAC,,, | Performed by: UROLOGY

## 2018-05-15 PROCEDURE — 99215 OFFICE O/P EST HI 40 MIN: CPT | Mod: S$GLB,,, | Performed by: UROLOGY

## 2018-05-15 RX ORDER — TRAMADOL HYDROCHLORIDE 50 MG/1
50 TABLET ORAL EVERY 6 HOURS PRN
Qty: 28 TABLET | Refills: 0 | Status: SHIPPED | OUTPATIENT
Start: 2018-05-15 | End: 2018-05-16

## 2018-05-15 RX ORDER — LIDOCAINE HYDROCHLORIDE 20 MG/ML
JELLY TOPICAL ONCE
Status: CANCELLED | OUTPATIENT
Start: 2018-05-15 | End: 2018-05-15

## 2018-05-15 RX ORDER — SODIUM CHLORIDE 9 MG/ML
INJECTION, SOLUTION INTRAVENOUS CONTINUOUS
Status: CANCELLED | OUTPATIENT
Start: 2018-05-15

## 2018-05-15 RX ORDER — CIPROFLOXACIN 2 MG/ML
400 INJECTION, SOLUTION INTRAVENOUS
Status: CANCELLED | OUTPATIENT
Start: 2018-05-15

## 2018-05-15 NOTE — PROGRESS NOTES
Subjective:       Patient ID: Lesa Leyva is a 38 y.o. female.    Chief Complaint: Other (discuss removing stent)    39 yo WF with chronic Left hydronephrosis and ureteral stricture. Here to schedule bladder fulguration and stent removal. Pain worse recently.   Pain meds not helping as much.        Flank Pain   This is a chronic problem. The current episode started more than 1 year ago. The problem occurs constantly. The problem has been waxing and waning since onset. The pain is present in the costovertebral angle. The quality of the pain is described as aching and stabbing. Radiates to: Left lower quadrant. The pain is at a severity of 8/10. The pain is severe. The pain is worse during the night. The symptoms are aggravated by bending and twisting. Associated symptoms include abdominal pain and dysuria. Pertinent negatives include no bladder incontinence, bowel incontinence, chest pain, fever, headaches, leg pain, numbness, paresis, paresthesias, pelvic pain, perianal numbness, tingling, weakness or weight loss. Risk factors include sedentary lifestyle. She has tried analgesics for the symptoms. The treatment provided moderate relief.     Review of Systems   Constitutional: Negative for activity change, appetite change, chills, fatigue, fever and weight loss.   HENT: Negative for congestion, ear pain, hearing loss, nosebleeds, sinus pressure, sore throat and trouble swallowing.    Eyes: Negative for pain and visual disturbance.   Respiratory: Negative for apnea, cough and shortness of breath.    Cardiovascular: Negative for chest pain and leg swelling.   Gastrointestinal: Positive for abdominal pain. Negative for abdominal distention, anal bleeding, blood in stool, bowel incontinence, constipation, diarrhea, nausea, rectal pain and vomiting.   Endocrine: Negative for cold intolerance, heat intolerance, polydipsia, polyphagia and polyuria.   Genitourinary: Positive for dysuria and flank pain. Negative for  bladder incontinence, decreased urine volume, difficulty urinating, dyspareunia, enuresis, frequency, genital sores, hematuria, menstrual problem, pelvic pain, urgency, vaginal bleeding, vaginal discharge and vaginal pain.   Musculoskeletal: Negative for arthralgias and back pain.   Skin: Negative for color change, pallor and rash.   Allergic/Immunologic: Negative for environmental allergies, food allergies and immunocompromised state.   Neurological: Negative for dizziness, tingling, speech difficulty, weakness, numbness, headaches and paresthesias.   Hematological: Negative for adenopathy. Does not bruise/bleed easily.   Psychiatric/Behavioral: Negative.        Objective:      Physical Exam   Nursing note and vitals reviewed.  Constitutional: She is oriented to person, place, and time. She appears well-developed and well-nourished.   HENT:   Head: Normocephalic.   Nose: Nose normal.   Mouth/Throat: Oropharynx is clear and moist.   Eyes: Conjunctivae and EOM are normal. Pupils are equal, round, and reactive to light.   Neck: Normal range of motion. Neck supple.   Cardiovascular: Normal rate, regular rhythm, normal heart sounds and intact distal pulses.    Pulmonary/Chest: Effort normal and breath sounds normal.   Abdominal: Soft. Bowel sounds are normal.   Musculoskeletal: Normal range of motion.   Neurological: She is alert and oriented to person, place, and time. She has normal reflexes.   Skin: Skin is warm and dry.     Psychiatric: She has a normal mood and affect. Her behavior is normal. Judgment and thought content normal.       Assessment:       1. Left flank pain    2. Ureteral stricture, left    3. Urinary frequency    4. Dysuria    5. Cystitis cystica        Plan:       Patient Instructions   Plan to remove stent, Retrograde pyelogram. Fulgurate bladder lesions 2 weeks.

## 2018-05-16 ENCOUNTER — TELEPHONE (OUTPATIENT)
Dept: UROLOGY | Facility: CLINIC | Age: 38
End: 2018-05-16

## 2018-05-16 RX ORDER — OXYCODONE AND ACETAMINOPHEN 10; 325 MG/1; MG/1
1 TABLET ORAL EVERY 6 HOURS PRN
Qty: 28 TABLET | Refills: 0 | Status: SHIPPED | OUTPATIENT
Start: 2018-05-16 | End: 2018-05-23

## 2018-05-16 NOTE — TELEPHONE ENCOUNTER
----- Message from Jocelyn Sharpe sent at 5/16/2018  8:09 AM CDT -----  Contact: Self/ [phone]  Patient called in to let you know she is still having headaches after taking tramadol. She is asking for her percocet medication.     Please call and advise.

## 2018-05-25 RX ORDER — OXYCODONE AND ACETAMINOPHEN 10; 325 MG/1; MG/1
1 TABLET ORAL EVERY 6 HOURS PRN
Qty: 28 TABLET | Refills: 0 | Status: SHIPPED | OUTPATIENT
Start: 2018-05-25 | End: 2018-06-01 | Stop reason: SDUPTHER

## 2018-05-25 RX ORDER — OXYCODONE AND ACETAMINOPHEN 10; 325 MG/1; MG/1
1 TABLET ORAL EVERY 4 HOURS PRN
COMMUNITY
End: 2018-05-25 | Stop reason: SDUPTHER

## 2018-06-01 ENCOUNTER — TELEPHONE (OUTPATIENT)
Dept: UROLOGY | Facility: CLINIC | Age: 38
End: 2018-06-01

## 2018-06-01 ENCOUNTER — PATIENT MESSAGE (OUTPATIENT)
Dept: UROLOGY | Facility: CLINIC | Age: 38
End: 2018-06-01

## 2018-06-01 RX ORDER — CIPROFLOXACIN 500 MG/1
500 TABLET ORAL 2 TIMES DAILY
Qty: 10 TABLET | Refills: 0 | Status: SHIPPED | OUTPATIENT
Start: 2018-06-01 | End: 2018-06-06

## 2018-06-01 RX ORDER — OXYCODONE AND ACETAMINOPHEN 10; 325 MG/1; MG/1
1 TABLET ORAL EVERY 6 HOURS PRN
Qty: 28 TABLET | Refills: 0 | Status: SHIPPED | OUTPATIENT
Start: 2018-06-01 | End: 2018-06-08 | Stop reason: SDUPTHER

## 2018-06-01 NOTE — TELEPHONE ENCOUNTER
----- Message from Angel Hernandez MD sent at 6/1/2018  2:05 PM CDT -----  Notify pt that new antibiotic called in for UTI

## 2018-06-06 ENCOUNTER — PATIENT MESSAGE (OUTPATIENT)
Dept: UROLOGY | Facility: CLINIC | Age: 38
End: 2018-06-06

## 2018-06-06 ENCOUNTER — TELEPHONE (OUTPATIENT)
Dept: UROLOGY | Facility: CLINIC | Age: 38
End: 2018-06-06

## 2018-06-06 DIAGNOSIS — N13.30 HYDRONEPHROSIS, UNSPECIFIED HYDRONEPHROSIS TYPE: ICD-10-CM

## 2018-06-08 ENCOUNTER — TELEPHONE (OUTPATIENT)
Dept: UROLOGY | Facility: CLINIC | Age: 38
End: 2018-06-08

## 2018-06-08 NOTE — TELEPHONE ENCOUNTER
----- Message from Karrie Joya sent at 6/8/2018 10:38 AM CDT -----  Contact: [phone]  Patient is requesting a call back regarding, her pain is worst. please advised     She said her pain is an 8

## 2018-06-12 ENCOUNTER — PATIENT MESSAGE (OUTPATIENT)
Dept: UROLOGY | Facility: CLINIC | Age: 38
End: 2018-06-12

## 2018-06-13 RX ORDER — OXYCODONE AND ACETAMINOPHEN 10; 325 MG/1; MG/1
1 TABLET ORAL EVERY 6 HOURS PRN
Qty: 28 TABLET | Refills: 0 | Status: SHIPPED | OUTPATIENT
Start: 2018-06-13 | End: 2018-06-20

## 2018-06-20 ENCOUNTER — OFFICE VISIT (OUTPATIENT)
Dept: UROLOGY | Facility: CLINIC | Age: 38
End: 2018-06-20
Payer: COMMERCIAL

## 2018-06-20 VITALS
WEIGHT: 200 LBS | BODY MASS INDEX: 34.15 KG/M2 | HEIGHT: 64 IN | RESPIRATION RATE: 18 BRPM | OXYGEN SATURATION: 99 % | SYSTOLIC BLOOD PRESSURE: 125 MMHG | DIASTOLIC BLOOD PRESSURE: 84 MMHG | HEART RATE: 99 BPM

## 2018-06-20 DIAGNOSIS — Z98.890 POST-OPERATIVE STATE: Primary | ICD-10-CM

## 2018-06-20 DIAGNOSIS — N39.0 RECURRENT UTI (URINARY TRACT INFECTION): ICD-10-CM

## 2018-06-20 DIAGNOSIS — N13.1 HYDRONEPHROSIS CONCURRENT WITH AND DUE TO URETERAL STRICTURE: ICD-10-CM

## 2018-06-20 DIAGNOSIS — N30.80 CYSTITIS CYSTICA: ICD-10-CM

## 2018-06-20 DIAGNOSIS — N13.5 URETERAL STRICTURE, LEFT: ICD-10-CM

## 2018-06-20 DIAGNOSIS — R31.0 GROSS HEMATURIA: ICD-10-CM

## 2018-06-20 DIAGNOSIS — N30.10 INTERSTITIAL CYSTITIS: ICD-10-CM

## 2018-06-20 LAB
BACTERIA #/AREA URNS AUTO: ABNORMAL /HPF
BILIRUB UR QL STRIP: NEGATIVE
CLARITY UR REFRACT.AUTO: ABNORMAL
COLOR UR AUTO: YELLOW
GLUCOSE UR QL STRIP: NEGATIVE
HGB UR QL STRIP: NEGATIVE
KETONES UR QL STRIP: NEGATIVE
LEUKOCYTE ESTERASE UR QL STRIP: ABNORMAL
MICROSCOPIC COMMENT: ABNORMAL
NITRITE UR QL STRIP: POSITIVE
PH UR STRIP: 6 [PH] (ref 5–8)
PROT UR QL STRIP: NEGATIVE
RBC #/AREA URNS AUTO: 1 /HPF (ref 0–4)
SP GR UR STRIP: 1.01 (ref 1–1.03)
SQUAMOUS #/AREA URNS AUTO: 9 /HPF
URN SPEC COLLECT METH UR: ABNORMAL
UROBILINOGEN UR STRIP-ACNC: NEGATIVE EU/DL
WBC #/AREA URNS AUTO: 94 /HPF (ref 0–5)
WBC CLUMPS UR QL AUTO: ABNORMAL

## 2018-06-20 PROCEDURE — 87088 URINE BACTERIA CULTURE: CPT

## 2018-06-20 PROCEDURE — 87077 CULTURE AEROBIC IDENTIFY: CPT | Mod: 59

## 2018-06-20 PROCEDURE — 87086 URINE CULTURE/COLONY COUNT: CPT

## 2018-06-20 PROCEDURE — 81001 URINALYSIS AUTO W/SCOPE: CPT

## 2018-06-20 PROCEDURE — 87186 SC STD MICRODIL/AGAR DIL: CPT

## 2018-06-20 PROCEDURE — 99213 OFFICE O/P EST LOW 20 MIN: CPT | Mod: S$GLB,,, | Performed by: NURSE PRACTITIONER

## 2018-06-20 PROCEDURE — 3008F BODY MASS INDEX DOCD: CPT | Mod: CPTII,S$GLB,, | Performed by: NURSE PRACTITIONER

## 2018-06-20 PROCEDURE — 99999 PR PBB SHADOW E&M-EST. PATIENT-LVL V: CPT | Mod: PBBFAC,,, | Performed by: NURSE PRACTITIONER

## 2018-06-20 NOTE — PROGRESS NOTES
Subjective:       Patient ID: Lesa Leyva is a 38 y.o. female.    Chief Complaint: Post-op Evaluation    Patient is here today for post-operative F/U. Patient is S/P cystourethroscopy with bladder fulguration, retrograde ureteral pyelogram and ureteral stent removal using fluoroscopy on 5/31/2018. Patient has a history of left renal colic, left hydronephrosis, left ureteral stricture, cystitis cystica and recurrent urinary tract infection. Patient recently treated for UTI in ER. Antibiotic (Augmentin) completed on Friday. Patient reports she still have left flank pain, rating it a 6/10 on pain scale. Pain medication alleviates pain, but it returns once medication is worn off. She states the left flank pain and groin pain has significantly improved since procedure.       Other   Chronicity: S/P cystoscopy with stent removal. Episode onset: 5/31/2018. The problem occurs intermittently. The problem has been gradually improving. Associated symptoms include chills, fatigue, headaches and nausea. Pertinent negatives include no abdominal pain, fever, sore throat, vomiting or weakness.   Hematuria   This is a new problem. The current episode started in the past 7 days. The problem has been resolved (last noted 2 days ago) since onset. She describes the hematuria as gross hematuria. The hematuria occurs during the terminal portion of her urinary stream. She reports no clotting in her urine stream. Her pain is at a severity of 0/10. She is experiencing no pain. She describes her urine color as bright red. Irritative symptoms do not include frequency, nocturia or urgency. Associated symptoms include chills, flank pain (left) and nausea. Pertinent negatives include no abdominal pain, dysuria, facial swelling, fever, genital pain, hesitancy, inability to urinate, vomiting or sore throat. She is sexually active. Her past medical history is significant for kidney stones and UTI. There is no history of hypertension or tobacco  use.     Review of Systems   Constitutional: Positive for appetite change (decreased), chills and fatigue. Negative for fever.   HENT: Negative for facial swelling and sore throat.    Gastrointestinal: Positive for nausea. Negative for abdominal pain, constipation, diarrhea and vomiting.   Genitourinary: Positive for difficulty urinating, flank pain (left) and hematuria (gross). Negative for decreased urine volume, dysuria, frequency, hesitancy, nocturia, pelvic pain, urgency, vaginal bleeding, vaginal discharge and vaginal pain.   Neurological: Positive for headaches. Negative for dizziness, weakness and light-headedness.   Psychiatric/Behavioral: Negative.        Objective:      Physical Exam   Constitutional: She is oriented to person, place, and time. She appears well-developed and well-nourished.   HENT:   Head: Normocephalic and atraumatic.   Eyes: EOM are normal. Pupils are equal, round, and reactive to light.   Neck: Normal range of motion.   Cardiovascular: Normal rate.    Pulmonary/Chest: Effort normal. No respiratory distress.   Abdominal: Soft. There is no tenderness.   Musculoskeletal: Normal range of motion. She exhibits no edema.   Neurological: She is alert and oriented to person, place, and time. Coordination normal.   Skin: Skin is warm and dry.   Psychiatric: She has a normal mood and affect. Her behavior is normal. Judgment and thought content normal.   Nursing note and vitals reviewed.      Assessment:       1. Post-operative state    2. Gross hematuria    3. Cystitis cystica    4. Interstitial cystitis    5. Ureteral stricture, left    6. Hydronephrosis concurrent with and due to ureteral stricture    7. Recurrent UTI (urinary tract infection)        Plan:       Lesa was seen today for post-op evaluation.    Diagnoses and all orders for this visit:    Post-operative state    Gross hematuria  -     Urinalysis  -     Urine culture; Future    Cystitis cystica    Interstitial  cystitis    Ureteral stricture, left    Hydronephrosis concurrent with and due to ureteral stricture    Recurrent UTI (urinary tract infection)  -     Urinalysis  -     Urine culture; Future    Follow-up in 6 weeks and as needed.     Soren March, NP

## 2018-06-20 NOTE — PATIENT INSTRUCTIONS
U/A  Urine culture (urine recheck after antibiotic therapy)  Discuss imaging with Dr. Hernandez.   Follow-up in 6 weeks

## 2018-06-21 ENCOUNTER — PATIENT MESSAGE (OUTPATIENT)
Dept: UROLOGY | Facility: CLINIC | Age: 38
End: 2018-06-21

## 2018-06-22 ENCOUNTER — PATIENT MESSAGE (OUTPATIENT)
Dept: UROLOGY | Facility: CLINIC | Age: 38
End: 2018-06-22

## 2018-06-22 RX ORDER — OXYCODONE AND ACETAMINOPHEN 10; 325 MG/1; MG/1
1 TABLET ORAL EVERY 6 HOURS PRN
Qty: 18 TABLET | Refills: 0 | Status: SHIPPED | OUTPATIENT
Start: 2018-06-22 | End: 2018-06-26 | Stop reason: SDUPTHER

## 2018-06-24 LAB
BACTERIA UR CULT: NORMAL
BACTERIA UR CULT: NORMAL

## 2018-06-25 DIAGNOSIS — N30.00 ACUTE CYSTITIS WITHOUT HEMATURIA: Primary | ICD-10-CM

## 2018-06-25 RX ORDER — AMOXICILLIN AND CLAVULANATE POTASSIUM 875; 125 MG/1; MG/1
1 TABLET, FILM COATED ORAL EVERY 12 HOURS
Qty: 20 TABLET | Refills: 0 | Status: SHIPPED | OUTPATIENT
Start: 2018-06-25 | End: 2018-07-05

## 2018-06-25 NOTE — PROGRESS NOTES
Patient notified via telephone regarding urine culture result.     Follow-up on July 6, 2018 for nurse visit (urine culture post antibiotic therapy).    Soren March NP

## 2018-06-26 ENCOUNTER — PATIENT MESSAGE (OUTPATIENT)
Dept: UROLOGY | Facility: CLINIC | Age: 38
End: 2018-06-26

## 2018-06-27 RX ORDER — OXYCODONE AND ACETAMINOPHEN 10; 325 MG/1; MG/1
1 TABLET ORAL EVERY 6 HOURS PRN
Qty: 18 TABLET | Refills: 0 | Status: SHIPPED | OUTPATIENT
Start: 2018-06-27 | End: 2018-07-18 | Stop reason: SDUPTHER

## 2018-06-27 NOTE — TELEPHONE ENCOUNTER
----- Message from Eulalia Cole sent at 6/27/2018  1:36 PM CDT -----  Contact: [phone]  Pt requesting a refill on PERCOCET . Please advise

## 2018-07-05 DIAGNOSIS — R10.32 LLQ PAIN: Primary | ICD-10-CM

## 2018-07-13 ENCOUNTER — HOSPITAL ENCOUNTER (OUTPATIENT)
Dept: RADIOLOGY | Facility: HOSPITAL | Age: 38
Discharge: HOME | End: 2018-07-13
Attending: SPECIALIST
Payer: COMMERCIAL

## 2018-07-13 DIAGNOSIS — R10.32 LLQ PAIN: ICD-10-CM

## 2018-07-13 PROCEDURE — 76856 US EXAM PELVIC COMPLETE: CPT | Mod: TC,PO

## 2018-07-18 RX ORDER — OXYCODONE AND ACETAMINOPHEN 10; 325 MG/1; MG/1
1 TABLET ORAL EVERY 6 HOURS PRN
Qty: 18 TABLET | Refills: 0 | Status: SHIPPED | OUTPATIENT
Start: 2018-07-18 | End: 2018-08-16 | Stop reason: SDUPTHER

## 2018-07-31 RX ORDER — OXYCODONE AND ACETAMINOPHEN 10; 325 MG/1; MG/1
1 TABLET ORAL EVERY 6 HOURS PRN
Qty: 28 TABLET | Refills: 0 | Status: SHIPPED | OUTPATIENT
Start: 2018-07-31 | End: 2018-08-07

## 2018-08-17 RX ORDER — OXYCODONE AND ACETAMINOPHEN 10; 325 MG/1; MG/1
1 TABLET ORAL EVERY 6 HOURS PRN
Qty: 18 TABLET | Refills: 0 | Status: SHIPPED | OUTPATIENT
Start: 2018-08-17 | End: 2018-08-28 | Stop reason: SDUPTHER

## 2018-08-28 RX ORDER — OXYCODONE AND ACETAMINOPHEN 10; 325 MG/1; MG/1
1 TABLET ORAL EVERY 6 HOURS PRN
Qty: 18 TABLET | Refills: 0 | Status: SHIPPED | OUTPATIENT
Start: 2018-08-28 | End: 2018-09-18 | Stop reason: SDUPTHER

## 2018-09-04 ENCOUNTER — OFFICE VISIT (OUTPATIENT)
Dept: UROLOGY | Facility: CLINIC | Age: 38
End: 2018-09-04
Payer: COMMERCIAL

## 2018-09-04 ENCOUNTER — PATIENT MESSAGE (OUTPATIENT)
Dept: UROLOGY | Facility: CLINIC | Age: 38
End: 2018-09-04

## 2018-09-04 VITALS — HEIGHT: 64 IN | BODY MASS INDEX: 34.33 KG/M2

## 2018-09-04 DIAGNOSIS — R50.9 FEVER AND CHILLS: ICD-10-CM

## 2018-09-04 DIAGNOSIS — R10.9 BILATERAL FLANK PAIN: ICD-10-CM

## 2018-09-04 DIAGNOSIS — N12 PYELONEPHRITIS: ICD-10-CM

## 2018-09-04 DIAGNOSIS — N20.1 RIGHT DISTAL URETERAL CALCULUS: Primary | ICD-10-CM

## 2018-09-04 DIAGNOSIS — N13.2 HYDRONEPHROSIS WITH URINARY OBSTRUCTION DUE TO URETERAL CALCULUS: ICD-10-CM

## 2018-09-04 DIAGNOSIS — N13.1 HYDRONEPHROSIS CONCURRENT WITH AND DUE TO URETERAL STRICTURE: ICD-10-CM

## 2018-09-04 PROCEDURE — 99215 OFFICE O/P EST HI 40 MIN: CPT | Mod: S$GLB,,, | Performed by: UROLOGY

## 2018-09-04 PROCEDURE — 3008F BODY MASS INDEX DOCD: CPT | Mod: CPTII,S$GLB,, | Performed by: UROLOGY

## 2018-09-04 PROCEDURE — 87086 URINE CULTURE/COLONY COUNT: CPT

## 2018-09-04 PROCEDURE — 99999 PR PBB SHADOW E&M-EST. PATIENT-LVL II: CPT | Mod: PBBFAC,,, | Performed by: UROLOGY

## 2018-09-04 RX ORDER — SODIUM CHLORIDE 9 MG/ML
INJECTION, SOLUTION INTRAVENOUS CONTINUOUS
Status: CANCELLED | OUTPATIENT
Start: 2018-09-04

## 2018-09-04 NOTE — PROGRESS NOTES
Subjective:       Patient ID: Lesa Leyva is a 38 y.o. female.    Chief Complaint: No chief complaint on file.    37 yo WF with long history of left ureteral stricture/aperistaltic segment and hydroureteronephrosis who hs been stent free for just over 3 months. Developed fever and chills with bilateral flank pain ( L>R) radiating to the pelvis 48 hours ago. T max was 102 degrees. Presents for evaluation and treat ment. No dysuria, but increases frequency. Positive for Nausea and Vomiting for 2 days.      Flank Pain   This is a new problem. The current episode started in the past 7 days. The problem occurs constantly. The problem has been gradually worsening since onset. The pain is present in the costovertebral angle (bilateral). The quality of the pain is described as stabbing. Radiates to: pelvis bilaterally. The pain is at a severity of 10/10. The pain is severe. The pain is the same all the time. The symptoms are aggravated by bending, lying down, sitting, standing, twisting and position. Associated symptoms include dysuria (bilateral kidneys), a fever and pelvic pain. Pertinent negatives include no abdominal pain, bladder incontinence, bowel incontinence, chest pain, headaches, leg pain, numbness, paresis, paresthesias, perianal numbness, tingling, weakness or weight loss. Risk factors include obesity, lack of exercise, renal stones and sedentary lifestyle. She has tried analgesics (Phenergan) for the symptoms. The treatment provided no relief.   Fever    This is a new problem. The current episode started yesterday. The problem occurs intermittently. The problem has been waxing and waning. The maximum temperature noted was 102 to 102.9 F. The temperature was taken using an oral thermometer. Associated symptoms include nausea, urinary pain (bilateral kidneys) and vomiting. Pertinent negatives include no abdominal pain, chest pain, congestion, coughing, diarrhea, ear pain, headaches, muscle aches, rash,  sleepiness, sore throat or wheezing. She has tried acetaminophen for the symptoms. The treatment provided moderate relief.     Review of Systems   Constitutional: Positive for activity change, appetite change, chills and fever. Negative for diaphoresis, fatigue, unexpected weight change and weight loss.   HENT: Negative for congestion, ear pain, hearing loss, nosebleeds, sinus pressure, sore throat and trouble swallowing.    Eyes: Negative.  Negative for pain and visual disturbance.   Respiratory: Negative for apnea, cough, shortness of breath and wheezing.    Cardiovascular: Negative for chest pain and leg swelling.   Gastrointestinal: Positive for nausea and vomiting. Negative for abdominal distention, abdominal pain, anal bleeding, blood in stool, bowel incontinence, constipation, diarrhea and rectal pain.   Endocrine: Negative for cold intolerance, heat intolerance, polydipsia, polyphagia and polyuria.   Genitourinary: Positive for dysuria (bilateral kidneys), flank pain, frequency and pelvic pain. Negative for bladder incontinence, decreased urine volume, difficulty urinating, dyspareunia, enuresis, genital sores, hematuria, menstrual problem, urgency, vaginal bleeding, vaginal discharge and vaginal pain.   Musculoskeletal: Negative for arthralgias and back pain.   Skin: Negative for color change, pallor and rash.   Allergic/Immunologic: Negative for environmental allergies, food allergies and immunocompromised state.   Neurological: Negative for dizziness, tingling, speech difficulty, weakness, numbness, headaches and paresthesias.   Hematological: Negative for adenopathy. Does not bruise/bleed easily.   Psychiatric/Behavioral: Negative.        Objective:      Physical Exam   Nursing note and vitals reviewed.  Constitutional: She is oriented to person, place, and time. She appears well-developed and well-nourished.   HENT:   Head: Normocephalic.   Nose: Nose normal.   Mouth/Throat: Oropharynx is clear and  moist.   Eyes: Conjunctivae and EOM are normal. Pupils are equal, round, and reactive to light.   Neck: Normal range of motion. Neck supple.   Cardiovascular: Normal rate, regular rhythm, normal heart sounds and intact distal pulses.    Pulmonary/Chest: Effort normal and breath sounds normal.   Abdominal: Soft. Bowel sounds are normal.   Genitourinary:   Genitourinary Comments: Bilateral CVAT radiating to pelvis/bladder L>R   Musculoskeletal: Normal range of motion.   Neurological: She is alert and oriented to person, place, and time. She has normal reflexes.   Skin: Skin is warm and dry.     Psychiatric: She has a normal mood and affect. Her behavior is normal. Judgment and thought content normal.       Assessment:       1. Pyelonephritis    2. Fever and chills    3. Hydronephrosis concurrent with and due to ureteral stricture    4. Bilateral flank pain        Plan:       Patient Instructions   CT renal survey  Nuclear Renogram with Lasix washout  Cipro 500 mg bid x 14 days  Zofran ODT 8mg prn  Percocet 10/325 prn pain  Advil for temp  Place stent emergently if obstruction revealed  F/U 3 weeks if no intervention needed.

## 2018-09-04 NOTE — PATIENT INSTRUCTIONS
CT renal survey  Nuclear Renogram with Lasix washout  Cipro 500 mg bid x 14 days  Zofran ODT 8mg prn  Percocet 10/325 prn pain  Advil for temp  Place stent emergently if obstruction revealed  F/U 3 weeks if no intervention needed.

## 2018-09-05 ENCOUNTER — TELEPHONE (OUTPATIENT)
Dept: UROLOGY | Facility: CLINIC | Age: 38
End: 2018-09-05

## 2018-09-05 NOTE — TELEPHONE ENCOUNTER
----- Message from Ronna Holcomb sent at 9/5/2018  3:27 PM CDT -----  Contact: Elizabeth Lamb/[facility]  [phone]  Elizabeth is requesting to speak with you concerning the address that's listed for the patient CT Scan.   Please call and advise

## 2018-09-06 LAB — BACTERIA UR CULT: NORMAL

## 2018-09-18 RX ORDER — OXYCODONE AND ACETAMINOPHEN 10; 325 MG/1; MG/1
1 TABLET ORAL EVERY 6 HOURS PRN
Qty: 18 TABLET | Refills: 0 | Status: SHIPPED | OUTPATIENT
Start: 2018-09-18 | End: 2018-09-25 | Stop reason: SDUPTHER

## 2018-09-25 RX ORDER — OXYCODONE AND ACETAMINOPHEN 10; 325 MG/1; MG/1
1 TABLET ORAL EVERY 6 HOURS PRN
Qty: 18 TABLET | Refills: 0 | Status: SHIPPED | OUTPATIENT
Start: 2018-09-25 | End: 2018-10-02 | Stop reason: SDUPTHER

## 2018-10-02 RX ORDER — OXYCODONE AND ACETAMINOPHEN 10; 325 MG/1; MG/1
1 TABLET ORAL EVERY 6 HOURS PRN
Qty: 18 TABLET | Refills: 0 | Status: SHIPPED | OUTPATIENT
Start: 2018-10-02 | End: 2018-10-10 | Stop reason: SDUPTHER

## 2018-10-09 RX ORDER — OXYCODONE AND ACETAMINOPHEN 10; 325 MG/1; MG/1
1 TABLET ORAL EVERY 6 HOURS PRN
Qty: 18 TABLET | Refills: 0 | OUTPATIENT
Start: 2018-10-09

## 2018-10-10 ENCOUNTER — PATIENT MESSAGE (OUTPATIENT)
Dept: FAMILY MEDICINE | Facility: CLINIC | Age: 38
End: 2018-10-10

## 2018-10-10 ENCOUNTER — PATIENT MESSAGE (OUTPATIENT)
Dept: UROLOGY | Facility: CLINIC | Age: 38
End: 2018-10-10

## 2018-10-10 RX ORDER — OXYCODONE AND ACETAMINOPHEN 10; 325 MG/1; MG/1
1 TABLET ORAL EVERY 6 HOURS PRN
Qty: 18 TABLET | Refills: 0 | Status: SHIPPED | OUTPATIENT
Start: 2018-10-10 | End: 2018-10-24 | Stop reason: SDUPTHER

## 2018-10-11 ENCOUNTER — TELEPHONE (OUTPATIENT)
Dept: UROLOGY | Facility: CLINIC | Age: 38
End: 2018-10-11

## 2018-10-24 RX ORDER — OXYCODONE AND ACETAMINOPHEN 10; 325 MG/1; MG/1
1 TABLET ORAL EVERY 6 HOURS PRN
Qty: 18 TABLET | Refills: 0 | Status: SHIPPED | OUTPATIENT
Start: 2018-10-24 | End: 2018-11-21 | Stop reason: SDUPTHER

## 2018-11-20 ENCOUNTER — OFFICE VISIT (OUTPATIENT)
Dept: UROLOGY | Facility: CLINIC | Age: 38
End: 2018-11-20
Payer: COMMERCIAL

## 2018-11-20 ENCOUNTER — PATIENT MESSAGE (OUTPATIENT)
Dept: UROLOGY | Facility: CLINIC | Age: 38
End: 2018-11-20

## 2018-11-20 VITALS
HEIGHT: 64 IN | BODY MASS INDEX: 37.56 KG/M2 | SYSTOLIC BLOOD PRESSURE: 127 MMHG | TEMPERATURE: 99 F | DIASTOLIC BLOOD PRESSURE: 82 MMHG | HEART RATE: 117 BPM | WEIGHT: 220 LBS

## 2018-11-20 DIAGNOSIS — R11.0 NAUSEA: ICD-10-CM

## 2018-11-20 DIAGNOSIS — R68.83 CHILLS (WITHOUT FEVER): ICD-10-CM

## 2018-11-20 DIAGNOSIS — R10.9 FLANK PAIN: Primary | ICD-10-CM

## 2018-11-20 DIAGNOSIS — R10.9 ACUTE RIGHT FLANK PAIN: ICD-10-CM

## 2018-11-20 DIAGNOSIS — N20.0 NEPHROLITHIASIS: ICD-10-CM

## 2018-11-20 LAB
B-HCG UR QL: NEGATIVE
BACTERIA #/AREA URNS AUTO: ABNORMAL /HPF
BILIRUB UR QL STRIP: NEGATIVE
CLARITY UR REFRACT.AUTO: ABNORMAL
COLOR UR AUTO: YELLOW
GLUCOSE UR QL STRIP: NEGATIVE
HGB UR QL STRIP: ABNORMAL
KETONES UR QL STRIP: NEGATIVE
LEUKOCYTE ESTERASE UR QL STRIP: ABNORMAL
MICROSCOPIC COMMENT: ABNORMAL
NITRITE UR QL STRIP: POSITIVE
PH UR STRIP: 5 [PH] (ref 5–8)
PROT UR QL STRIP: NEGATIVE
RBC #/AREA URNS AUTO: 3 /HPF (ref 0–4)
SP GR UR STRIP: 1.01 (ref 1–1.03)
SQUAMOUS #/AREA URNS AUTO: 6 /HPF
URN SPEC COLLECT METH UR: ABNORMAL
WBC #/AREA URNS AUTO: 67 /HPF (ref 0–5)

## 2018-11-20 PROCEDURE — 87186 SC STD MICRODIL/AGAR DIL: CPT

## 2018-11-20 PROCEDURE — 87086 URINE CULTURE/COLONY COUNT: CPT

## 2018-11-20 PROCEDURE — 81001 URINALYSIS AUTO W/SCOPE: CPT

## 2018-11-20 PROCEDURE — 81025 URINE PREGNANCY TEST: CPT

## 2018-11-20 PROCEDURE — 3008F BODY MASS INDEX DOCD: CPT | Mod: CPTII,S$GLB,, | Performed by: UROLOGY

## 2018-11-20 PROCEDURE — 87088 URINE BACTERIA CULTURE: CPT

## 2018-11-20 PROCEDURE — 99215 OFFICE O/P EST HI 40 MIN: CPT | Mod: 24,S$GLB,, | Performed by: UROLOGY

## 2018-11-20 PROCEDURE — 87077 CULTURE AEROBIC IDENTIFY: CPT

## 2018-11-20 PROCEDURE — 99999 PR PBB SHADOW E&M-EST. PATIENT-LVL IV: CPT | Mod: PBBFAC,,, | Performed by: UROLOGY

## 2018-11-20 RX ORDER — CIPROFLOXACIN 500 MG/1
500 TABLET ORAL EVERY 12 HOURS
Qty: 28 TABLET | Refills: 1 | Status: SHIPPED | OUTPATIENT
Start: 2018-11-20 | End: 2018-12-04

## 2018-11-20 NOTE — PROGRESS NOTES
Subjective:       Patient ID: Lesa Leyva is a 38 y.o. female.    Chief Complaint: Pain    37 yo WF  With history of left ureteral stricture and non-obstructive hydronephrosis with chronic pain who developed acute Right Flank pain with Nausea, Vomiting and Chills x 3 days. Presents for evaluation. ESWL for Right distal ureteral calculus in September with no residual pain. This is new onset pain.      Flank Pain   Pertinent negatives include no abdominal pain, chest pain, dysuria, fever, headaches, pelvic pain or weakness.     Review of Systems   Constitutional: Negative for activity change, appetite change, chills, fatigue and fever.   HENT: Negative for congestion, ear pain, hearing loss, nosebleeds, sinus pressure, sore throat and trouble swallowing.    Eyes: Negative for pain and visual disturbance.   Respiratory: Negative for apnea, cough and shortness of breath.    Cardiovascular: Negative for chest pain and leg swelling.   Gastrointestinal: Negative for abdominal distention, abdominal pain, anal bleeding, blood in stool, constipation, diarrhea, nausea, rectal pain and vomiting.   Endocrine: Negative for cold intolerance, heat intolerance, polydipsia, polyphagia and polyuria.   Genitourinary: Positive for flank pain. Negative for decreased urine volume, difficulty urinating, dyspareunia, dysuria, enuresis, frequency, genital sores, hematuria, menstrual problem, pelvic pain, urgency, vaginal bleeding, vaginal discharge and vaginal pain.   Musculoskeletal: Negative for arthralgias and back pain.   Skin: Negative for color change, pallor and rash.   Allergic/Immunologic: Negative for environmental allergies, food allergies and immunocompromised state.   Neurological: Negative for dizziness, speech difficulty, weakness and headaches.   Hematological: Negative for adenopathy. Does not bruise/bleed easily.   Psychiatric/Behavioral: Negative.        Objective:      Physical Exam   Nursing note and vitals  reviewed.  Constitutional: She is oriented to person, place, and time. She appears well-developed and well-nourished.   HENT:   Head: Normocephalic.   Nose: Nose normal.   Mouth/Throat: Oropharynx is clear and moist.   Eyes: Conjunctivae and EOM are normal. Pupils are equal, round, and reactive to light.   Neck: Normal range of motion. Neck supple.   Cardiovascular: Normal rate, regular rhythm, normal heart sounds and intact distal pulses.    Pulmonary/Chest: Effort normal and breath sounds normal.   Abdominal: Soft. Bowel sounds are normal.   Genitourinary: Vagina normal.   Musculoskeletal: Normal range of motion.   Neurological: She is alert and oriented to person, place, and time. She has normal reflexes.   Skin: Skin is warm and dry.     Psychiatric: She has a normal mood and affect. Her behavior is normal. Judgment and thought content normal.       Assessment:       1. Flank pain    2. Acute right flank pain    3. Nausea    4. Chills (without fever)    5. Nephrolithiasis        Plan:       Patient Instructions   Obtain CT Renal Survey this AM  Call Results  Possible intervention if obstruction or nephrolithiasis.  CBC, BMP, U/A, and U C+S

## 2018-11-20 NOTE — PATIENT INSTRUCTIONS
Obtain CT Renal Survey this AM  Call Results  Possible intervention if obstruction or nephrolithiasis.  CBC, BMP, U/A, and U C+S

## 2018-11-21 RX ORDER — OXYCODONE AND ACETAMINOPHEN 10; 325 MG/1; MG/1
1 TABLET ORAL EVERY 6 HOURS PRN
Qty: 18 TABLET | Refills: 0 | Status: SHIPPED | OUTPATIENT
Start: 2018-11-21 | End: 2019-01-25

## 2018-11-22 LAB
BACTERIA UR CULT: NORMAL
BACTERIA UR CULT: NORMAL

## 2018-11-26 ENCOUNTER — TELEPHONE (OUTPATIENT)
Dept: UROLOGY | Facility: CLINIC | Age: 38
End: 2018-11-26

## 2018-11-26 RX ORDER — NITROFURANTOIN 25; 75 MG/1; MG/1
100 CAPSULE ORAL 2 TIMES DAILY
Qty: 28 CAPSULE | Refills: 1 | Status: SHIPPED | OUTPATIENT
Start: 2018-11-26 | End: 2018-12-24

## 2018-11-26 RX ORDER — CEPHALEXIN 500 MG/1
500 CAPSULE ORAL 4 TIMES DAILY
Qty: 120 CAPSULE | Refills: 1 | Status: SHIPPED | OUTPATIENT
Start: 2018-11-26 | End: 2018-12-26

## 2018-11-26 NOTE — TELEPHONE ENCOUNTER
----- Message from Angel Hernandez MD sent at 11/26/2018  9:06 AM CST -----  Urine culture is positive, rx for keflex and macrobid called in. Resistant to Cipro

## 2018-11-28 ENCOUNTER — OFFICE VISIT (OUTPATIENT)
Dept: PAIN MEDICINE | Facility: CLINIC | Age: 38
End: 2018-11-28
Payer: COMMERCIAL

## 2018-11-28 VITALS
BODY MASS INDEX: 37.36 KG/M2 | DIASTOLIC BLOOD PRESSURE: 84 MMHG | HEART RATE: 102 BPM | WEIGHT: 218.81 LBS | SYSTOLIC BLOOD PRESSURE: 120 MMHG | HEIGHT: 64 IN

## 2018-11-28 DIAGNOSIS — R10.9 LEFT FLANK PAIN: ICD-10-CM

## 2018-11-28 DIAGNOSIS — G89.29 CHRONIC LEFT-SIDED LOW BACK PAIN WITHOUT SCIATICA: Primary | ICD-10-CM

## 2018-11-28 DIAGNOSIS — M54.50 CHRONIC LEFT-SIDED LOW BACK PAIN WITHOUT SCIATICA: Primary | ICD-10-CM

## 2018-11-28 DIAGNOSIS — M47.816 LUMBAR SPONDYLOSIS: ICD-10-CM

## 2018-11-28 PROCEDURE — 99244 OFF/OP CNSLTJ NEW/EST MOD 40: CPT | Mod: S$GLB,,, | Performed by: ANESTHESIOLOGY

## 2018-11-28 PROCEDURE — 99999 PR PBB SHADOW E&M-EST. PATIENT-LVL IV: CPT | Mod: PBBFAC,,, | Performed by: ANESTHESIOLOGY

## 2018-11-28 NOTE — LETTER
November 28, 2018      Angel Hernandez MD  [id number] [place] [address]  [address]  [facility] [address]           [facility]  [address]  [facility] [address]  Phone: [phone]  Fax: [phone]          Patient: Lesa Leyva   MR Number: [id number]   YOB: 1980   Date of Visit: 11/28/2018       Dear Dr. Angel Hernandez:    Thank you for referring Lesa Leyva to me for evaluation. Attached you will find relevant portions of my assessment and plan of care.    If you have questions, please do not hesitate to call me. I look forward to following Lesa Leyva along with you.    Sincerely,    Juan Miguel Alarcon III, MD    Enclosure  CC:  No Recipients    If you would like to receive this communication electronically, please contact [email or web address] or [phone] to request more information on [facility] [employer] access.    For providers and/or their staff who would like to refer a patient to [facility], please contact us through our one-stop-shop provider referral line, [facility], at [phone].    If you feel you have received this communication in error or would no longer like to receive these types of communications, please e-mail [email or web address]

## 2018-11-28 NOTE — PROGRESS NOTES
Chronic Pain - New Consult    Referring Physician: Angel Hernandez MD      Chief Complaint   Patient presents with    Back Pain        SUBJECTIVE:    Lesa Leyva is a 37 y/o female with hx of nephrolithiasis s/p recent lithotripsy who presents to the clinic for the evaluation of low back pain. The back pain started approximately 1 year ago and symptoms have been worsening. No trauma or inciting event. The pain is located in the left lumbar paraspinal and flank area. The pain does not radiate.  The pain is described as aching, sharp and throbbing and is rated as 5/10. She reports the back pain changed from a sharp pain to a throbbing pain after her recent lithotripsy. The pain is rated with a score of  7/10 on the BEST day and a score of 7/10 on the WORST day.  Symptoms interfere with daily activity and work. The pain is exacerbated by prolonged sitting, bending and twisting.  The pain is mitigated by heat, medication and rest. She has tried Tylenol and NSAIDS with no improvement. She is currently being treated for UTI on Antibiotics.    Patient denies bowel/bladder incontinence, significant motor weakness and loss of sensations.    Physical Therapy/Home Exercise: no      Pain Disability Index Review:  Last 3 PDI Scores 11/28/2018   Pain Disability Index (PDI) 32       Pain Medications:    - Percocet 10/325 as needed  - Cymbalta 60 mg daily     report:  Reviewed    Imaging:     CT RENAL STONE STUDY ABD PELVIS WO (11/20/2018):    FINDINGS:  Limited umbilical hernia stable.  Liver, gallbladder, spleen, adrenal glands, pancreas normal.    Urinary bladder normal.  Postop hysterectomy.  No adnexal mass, free fluid or adenopathy.    Diverticulosis coli sigmoid, left colon at junction with splenic flexure without diverticulitis.  Appendix normal.    Spondylosis dorsal lumbar spine with pars defects L5 and no associated subluxation L5 on S1.    Punctate tiny nonobstructive stone left upper and lower renal pole  with cortical atrophy left inferior renal pole.  Absence of right hydronephrosis, obstructing distal right ureter stone.    Past Medical History:   Diagnosis Date    Anxiety     Cystitis     Depression     Nephrolithiasis 11/20/2018    Neuropathy of both feet     Neuropathy of hand     Personal history of kidney stones 4/2012    has had several kidney stones and has bladder spasms    Urinary tract infection     Vaginal infection      Past Surgical History:   Procedure Laterality Date    BLADDER FULGURATION N/A 5/31/2018    Procedure: FULGURATION-BLADDER;  Surgeon: Angel Hernandez MD;  Location: [facility] OR;  Service: Urology;  Laterality: N/A;    BLADDER SUSPENSION  2013    bladder lifts    CYSTOSCOPY  5/26/2015    Performed by [facility] OR    CYSTOSCOPY W/ URETERAL STENT PLACEMENT Left 3/2015    CYSTOSCOPY W/ URETERAL STENT REMOVAL Left 5/31/2018    Procedure: CYSTOSCOPY WITH STENT REMOVAL;  Surgeon: Angel Hernandez MD;  Location: [facility] OR;  Service: Urology;  Laterality: Left;    CYSTOSCOPY WITH STENT PLACEMENT Left 2/15/2018    Performed by Angel [facility] at [facility] OR    CYSTOSCOPY WITH STENT REMOVAL Left 5/31/2018    [facility]formed by [facility] at [facility] OR    CYSTOSCOPY WITH STENT REMOVAL Left 2/15/2018    [facility]formed by [facility] at [facility] OR    EXTRACORPOREAL SHOCK WAVE LITHOTRIPSY Right 9/6/2018    Procedure: LITHOTRIPSY, ESWL - Right distal ureteral calculus;  Surgeon: Angel Hernandez MD;  Location: [facility] OR;  Service: Urology;  Laterality: Right;    FULGURATION-BLADDER N/A 5/31/2018    Performed by [facility] at [facility] OR    HYSTERECTOMY      Partial - still has ovaries    HYSTERECTOMY-VAGINAL-LAPAROSCOPIC (LAVH) Bilateral 5/26/2015    [facility] OR    KNEE SURGERY Left     lithotripsy  4/2012    LITHOTRIPSY, ESWL - Right distal ureteral calculus Right 9/6/2018    Performed by Angel [facility] at [facility] OR     OVARIAN CYST SURGERY  2006    PYELOGRAM, RETROGRADE Left 5/31/2018    [facility]    RETROGRADE PYELOGRAPHY Left 5/31/2018    Procedure: PYELOGRAM, RETROGRADE;  Surgeon: Angel Hernandez MD;  [facility];  Service: Urology;  Laterality: Left;     Social History     Socioeconomic History    Marital status:      Spouse name: Not on file    Number of children: Not on file    Years of education: Not on file    Highest education level: Not on file   Social Needs    Financial resource strain: Not on file    Food insecurity - worry: Not on file    Food insecurity - inability: Not on file    Transportation needs - medical: Not on file    Transportation needs - non-medical: Not on file   Occupational History    Occupation: teacher     Employer: [employer]   Tobacco Use    Smoking status: Never Smoker    Smokeless tobacco: Never Used   Substance and Sexual Activity    Alcohol use: No    Drug use: No    Sexual activity: Yes     Partners: Male   Other Topics Concern    Not on file   Social History Narrative    Lives with her  and 3 boys in [place]. 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      Family History   Problem Relation Age of Onset    No Known Problems Father     Hypertension Mother     Seizures Mother     Fibromyalgia Mother     No Known Problems Sister     Seizures Brother     Seizures Brother     No Known Problems Sister     No Known Problems Sister     Cancer Neg Hx     Heart disease Neg Hx     Kidney disease Neg Hx        Review of patient's allergies indicates:   Allergen Reactions    Tramadol Other (See Comments)     headache       Current Outpatient Medications   Medication Sig    acetaminophen (TYLENOL) 325 MG tablet Take 1 tablet (325 mg total) by mouth every 6 (six) hours as needed for Pain.    cephALEXin (KEFLEX) 500 MG capsule Take 1 capsule (500 mg total) by mouth 4 (four) times daily.  "   duloxetine (CYMBALTA) 60 MG capsule Take 60 mg by mouth once daily.    hyoscyamine (LEVSIN/SL) 0.125 mg Subl Place 1 tablet (0.125 mg total) under the tongue every 6 (six) hours as needed (Bladder Spasm).    ibuprofen 200 mg Cap Take 200 mg by mouth every 4 (four) hours as needed for Temperature greater than.     nitrofurantoin, macrocrystal-monohydrate, (MACROBID) 100 MG capsule Take 1 capsule (100 mg total) by mouth 2 (two) times daily.    ondansetron (ZOFRAN-ODT) 4 MG TbDL Take 1 tablet (4 mg total) by mouth every 6 (six) hours as needed.    tamsulosin (FLOMAX) 0.4 mg Cap Take 1 capsule (0.4 mg total) by mouth every evening.    URIBEL 118-10-40.8-36 mg Cap Take 1 capsule by mouth 4 (four) times daily as needed.    ciprofloxacin HCl (CIPRO) 500 MG tablet Take 1 tablet (500 mg total) by mouth every 12 (twelve) hours. for 14 days    oxyCODONE-acetaminophen (PERCOCET)  mg per tablet Take 1 tablet by mouth every 6 (six) hours as needed for Pain.     No current facility-administered medications for this visit.        REVIEW OF SYSTEMS:  GENERAL: No weight loss, malaise or fevers.  HEENT: Negative for frequent or significant headaches.  RESPIRATORY: Negative for wheezing or shortness of breath.  CARDIOVASCULAR: Negative for chest pain or palpitations.  GI: No blood in stools or black stools or change in bowel habits.  : + hx of kidney stones  MUSCULOSKELETAL: See HPI  SKIN: Negative for rash or itching.  PSYCH: Negative for sleep disturbance, mood disorder and recent psychosocial stressors.    HEMATOLOGY/LYMPHOLOGY Negative for prolonged bleeding, bruising easily or swollen nodes.  NEURO:  No history of seizures or tremors.    OBJECTIVE:    /84   Pulse 102   Ht 5' 4" (1.626 m)   Wt 99.2 kg (218 lb 12.9 oz)   LMP 05/08/2015   BMI 37.56 kg/m²     PHYSICAL EXAMINATION:  GENERAL: Well appearing, in no acute distress.  PSYCH:  Mood and affect is appropriate.  Awake, alert, and oriented x " 3.  SKIN: Skin color, texture, turgor normal, no rashes or lesions  HEENT: Normocephalic, atraumatic.  EOM intact.  CV: Radial pulses are 2+.  RESP:  Respirations are unlabored.  GI: Abdomen soft and non-tender.  MSK:  No atrophy or tone abnormalities are noted.      Neck: No pain with neck flexion, extension, or lateral rotation.  No obvious deformity or signs of trauma.  Normal cervical spine range of motion.    Back: Straight leg raising in the sitting and supine positions is negative for radicular pain. Tenderness to palpation over the upper lumbar paraspinous muscles on the left.  Negative for pain with facet loading and back extension/rotation. Normal range of motion without pain reproduction.    Buttocks:  No pain to palpation over the PSIS. Sacroiliac joint maneuvers are negative for pain.    Extremities:  Peripheral joint ROM is full and pain free without obvious instability or laxity in all four extremities. No edema or skin discolorations noted.     Gait:  Gait is normal    NEUR:  Bilateral lower extremity coordination and muscle stretch reflexes are physiologic and symmetric. Strength testing is 5/5 throughout all muscle groups in the upper and lower extremities. No loss of sensation is noted.     ASSESSMENT:     1. Chronic left-sided low back pain without sciatica    2. Lumbar spondylosis    3. Left flank pain        PLAN:     - I have stressed the importance of physical activity and a home exercise plan to help with pain and improve health.  - Referral to Physical therapy for lumbar stabilization, core strengthening, and a home exercise program.  - Rx topical compound pain cream to apply to painful areas.  - Will obtain MRI lumbar spine if no improvement with above.  - In the future, I will consider lumbar medial branch blocks.  - RTC in 4 weeks.      The above plan and management options were discussed at length with patient. Patient is in agreement with the above and verbalized understanding. It will  be communicated with the referring physician via electronic record, fax, or mail.    Juan Miguel Alarcon III  11/28/2018

## 2018-11-29 ENCOUNTER — PATIENT MESSAGE (OUTPATIENT)
Dept: UROLOGY | Facility: CLINIC | Age: 38
End: 2018-11-29

## 2018-11-30 ENCOUNTER — CLINICAL SUPPORT (OUTPATIENT)
Dept: UROLOGY | Facility: CLINIC | Age: 38
End: 2018-11-30
Payer: COMMERCIAL

## 2018-11-30 DIAGNOSIS — N39.0 URINARY TRACT INFECTION WITHOUT HEMATURIA, SITE UNSPECIFIED: Primary | ICD-10-CM

## 2018-11-30 PROCEDURE — 87086 URINE CULTURE/COLONY COUNT: CPT

## 2018-12-01 LAB — BACTERIA UR CULT: NORMAL

## 2018-12-17 ENCOUNTER — PATIENT MESSAGE (OUTPATIENT)
Dept: PAIN MEDICINE | Facility: CLINIC | Age: 38
End: 2018-12-17

## 2018-12-18 ENCOUNTER — PATIENT MESSAGE (OUTPATIENT)
Dept: PAIN MEDICINE | Facility: CLINIC | Age: 38
End: 2018-12-18

## 2019-01-04 ENCOUNTER — HOSPITAL ENCOUNTER (EMERGENCY)
Facility: HOSPITAL | Age: 39
Discharge: HOME | End: 2019-01-04
Attending: EMERGENCY MEDICINE
Payer: COMMERCIAL

## 2019-01-04 VITALS
DIASTOLIC BLOOD PRESSURE: 78 MMHG | SYSTOLIC BLOOD PRESSURE: 125 MMHG | OXYGEN SATURATION: 99 % | HEART RATE: 87 BPM | RESPIRATION RATE: 16 BRPM | TEMPERATURE: 98 F

## 2019-01-04 DIAGNOSIS — N12 PYELONEPHRITIS: Primary | ICD-10-CM

## 2019-01-04 DIAGNOSIS — R10.9 RIGHT FLANK PAIN: ICD-10-CM

## 2019-01-04 LAB
ALBUMIN SERPL BCP-MCNC: 4.5 G/DL
ALP SERPL-CCNC: 113 U/L
ALT SERPL W/O P-5'-P-CCNC: 17 U/L
ANION GAP SERPL CALC-SCNC: 8 MMOL/L
AST SERPL-CCNC: 20 U/L
B-HCG UR QL: NEGATIVE
BACTERIA #/AREA URNS AUTO: ABNORMAL /HPF
BASOPHILS # BLD AUTO: 0.04 K/UL
BASOPHILS NFR BLD: 0.4 %
BILIRUB SERPL-MCNC: 0.3 MG/DL
BILIRUB UR QL STRIP: ABNORMAL
BUN SERPL-MCNC: 18 MG/DL
CALCIUM SERPL-MCNC: 9.7 MG/DL
CHLORIDE SERPL-SCNC: 104 MMOL/L
CLARITY UR REFRACT.AUTO: ABNORMAL
CO2 SERPL-SCNC: 27 MMOL/L
COLOR UR AUTO: YELLOW
CREAT SERPL-MCNC: 1.01 MG/DL
DIFFERENTIAL METHOD: ABNORMAL
EOSINOPHIL # BLD AUTO: 0.1 K/UL
EOSINOPHIL NFR BLD: 1.1 %
ERYTHROCYTE [DISTWIDTH] IN BLOOD BY AUTOMATED COUNT: 13.9 %
EST. GFR  (AFRICAN AMERICAN): >60 ML/MIN/1.73 M^2
EST. GFR  (NON AFRICAN AMERICAN): >60 ML/MIN/1.73 M^2
GLUCOSE SERPL-MCNC: 97 MG/DL
GLUCOSE UR QL STRIP: NEGATIVE
HCT VFR BLD AUTO: 40.1 %
HGB BLD-MCNC: 12.9 G/DL
HGB UR QL STRIP: ABNORMAL
HYALINE CASTS UR QL AUTO: 0 /LPF
KETONES UR QL STRIP: NEGATIVE
LEUKOCYTE ESTERASE UR QL STRIP: ABNORMAL
LYMPHOCYTES # BLD AUTO: 2.9 K/UL
LYMPHOCYTES NFR BLD: 28.2 %
MCH RBC QN AUTO: 27.3 PG
MCHC RBC AUTO-ENTMCNC: 32.2 G/DL
MCV RBC AUTO: 85 FL
MICROSCOPIC COMMENT: ABNORMAL
MONOCYTES # BLD AUTO: 1.1 K/UL
MONOCYTES NFR BLD: 11.1 %
NEUTROPHILS # BLD AUTO: 6 K/UL
NEUTROPHILS NFR BLD: 58.2 %
NITRITE UR QL STRIP: POSITIVE
PH UR STRIP: 5 [PH] (ref 5–8)
PLATELET # BLD AUTO: 321 K/UL
PMV BLD AUTO: 12.1 FL
POTASSIUM SERPL-SCNC: 3.7 MMOL/L
PROT SERPL-MCNC: 8.2 G/DL
PROT UR QL STRIP: ABNORMAL
RBC # BLD AUTO: 4.73 M/UL
RBC #/AREA URNS AUTO: 10 /HPF (ref 0–4)
SODIUM SERPL-SCNC: 139 MMOL/L
SP GR UR STRIP: 1.02 (ref 1–1.03)
SQUAMOUS #/AREA URNS AUTO: ABNORMAL /HPF
URN SPEC COLLECT METH UR: ABNORMAL
UROBILINOGEN UR STRIP-ACNC: NEGATIVE EU/DL
WBC # BLD AUTO: 10.3 K/UL
WBC #/AREA URNS AUTO: >100 /HPF (ref 0–5)

## 2019-01-04 PROCEDURE — 80053 COMPREHEN METABOLIC PANEL: CPT | Mod: ER

## 2019-01-04 PROCEDURE — 96375 TX/PRO/DX INJ NEW DRUG ADDON: CPT | Mod: ER

## 2019-01-04 PROCEDURE — 96365 THER/PROPH/DIAG IV INF INIT: CPT | Mod: ER

## 2019-01-04 PROCEDURE — 25000003 PHARM REV CODE 250: Mod: ER | Performed by: PHYSICIAN ASSISTANT

## 2019-01-04 PROCEDURE — 96367 TX/PROPH/DG ADDL SEQ IV INF: CPT | Mod: ER

## 2019-01-04 PROCEDURE — 87086 URINE CULTURE/COLONY COUNT: CPT | Mod: ER

## 2019-01-04 PROCEDURE — 87077 CULTURE AEROBIC IDENTIFY: CPT | Mod: ER

## 2019-01-04 PROCEDURE — 81025 URINE PREGNANCY TEST: CPT | Mod: ER

## 2019-01-04 PROCEDURE — 63600175 PHARM REV CODE 636 W HCPCS: Mod: ER | Performed by: PHYSICIAN ASSISTANT

## 2019-01-04 PROCEDURE — 87186 SC STD MICRODIL/AGAR DIL: CPT | Mod: ER

## 2019-01-04 PROCEDURE — 81000 URINALYSIS NONAUTO W/SCOPE: CPT | Mod: ER

## 2019-01-04 PROCEDURE — 85025 COMPLETE CBC W/AUTO DIFF WBC: CPT | Mod: ER

## 2019-01-04 PROCEDURE — 87088 URINE BACTERIA CULTURE: CPT | Mod: ER

## 2019-01-04 PROCEDURE — 96361 HYDRATE IV INFUSION ADD-ON: CPT | Mod: ER

## 2019-01-04 PROCEDURE — 99285 EMERGENCY DEPT VISIT HI MDM: CPT | Mod: 25,ER

## 2019-01-04 RX ORDER — HYDROCODONE BITARTRATE AND ACETAMINOPHEN 5; 325 MG/1; MG/1
1 TABLET ORAL EVERY 4 HOURS PRN
Qty: 12 TABLET | Refills: 0 | Status: SHIPPED | OUTPATIENT
Start: 2019-01-04 | End: 2019-05-29

## 2019-01-04 RX ORDER — KETOROLAC TROMETHAMINE 30 MG/ML
15 INJECTION, SOLUTION INTRAMUSCULAR; INTRAVENOUS
Status: COMPLETED | OUTPATIENT
Start: 2019-01-04 | End: 2019-01-04

## 2019-01-04 RX ORDER — ONDANSETRON 4 MG/1
4 TABLET, ORALLY DISINTEGRATING ORAL EVERY 6 HOURS PRN
Qty: 20 TABLET | Refills: 0 | Status: SHIPPED | OUTPATIENT
Start: 2019-01-04 | End: 2020-01-15

## 2019-01-04 RX ORDER — TAMSULOSIN HYDROCHLORIDE 0.4 MG/1
0.4 CAPSULE ORAL
Status: DISCONTINUED | OUTPATIENT
Start: 2019-01-04 | End: 2019-01-04

## 2019-01-04 RX ORDER — ONDANSETRON 2 MG/ML
4 INJECTION INTRAMUSCULAR; INTRAVENOUS
Status: COMPLETED | OUTPATIENT
Start: 2019-01-04 | End: 2019-01-04

## 2019-01-04 RX ORDER — CEPHALEXIN 500 MG/1
500 CAPSULE ORAL 4 TIMES DAILY
Qty: 40 CAPSULE | Refills: 0 | Status: SHIPPED | OUTPATIENT
Start: 2019-01-04 | End: 2019-01-14

## 2019-01-04 RX ADMIN — KETOROLAC TROMETHAMINE 15 MG: 30 INJECTION, SOLUTION INTRAMUSCULAR at 08:01

## 2019-01-04 RX ADMIN — ONDANSETRON 4 MG: 2 INJECTION INTRAMUSCULAR; INTRAVENOUS at 08:01

## 2019-01-04 RX ADMIN — CEFTRIAXONE 1 G: 1 INJECTION, SOLUTION INTRAVENOUS at 09:01

## 2019-01-04 RX ADMIN — PROMETHAZINE HYDROCHLORIDE 12.5 MG: 25 INJECTION INTRAMUSCULAR; INTRAVENOUS at 09:01

## 2019-01-04 RX ADMIN — SODIUM CHLORIDE 1000 ML: 0.9 INJECTION, SOLUTION INTRAVENOUS at 08:01

## 2019-01-05 NOTE — ED PROVIDER NOTES
Encounter Date: 1/4/2019       History     Chief Complaint   Patient presents with    Flank Pain     Pt with c/o R sided back pain and flank pain that radiates to RLQ abdomen since yesterday. Pt also reports painful urination that started last night. Pt reports PMH of kidney stones and chronic UTI.     Lesa Leyva, a 38 y.o. female  has a past medical history of Anxiety, Cystitis, Depression, Nephrolithiasis (11/20/2018), Neuropathy of both feet, Neuropathy of hand, Personal history of kidney stones (4/2012), Urinary tract infection, and Vaginal infection.     She presents to the ED for evaluation of right sided flank pain that started last night.  Patient states that the pain has now started to wrap around to her abdomen and radiated to her falling.  She states she has an extensive history of kidney stones in the past that have required stent placement and this pain feels very similar to her previous kidney stone episodes.  She states she also has some burning with urination.  Treatments tried at home include ingestion of Tylenol and Zofran which have not helped with her pain or her nausea.  She denies any vomiting.  There has been no fever.          The history is provided by the patient.     Review of patient's allergies indicates:   Allergen Reactions    Peaches [peach (prunus persica)] Hives, Itching and Swelling    Tramadol Other (See Comments)     headache     Past Medical History:   Diagnosis Date    Anxiety     Cystitis     Depression     Nephrolithiasis 11/20/2018    Neuropathy of both feet     Neuropathy of hand     Personal history of kidney stones 4/2012    has had several kidney stones and has bladder spasms    Urinary tract infection     Vaginal infection      Past Surgical History:   Procedure Laterality Date    BLADDER SUSPENSION  2013    bladder lifts    CYSTOSCOPY  5/26/2015    [facility]    CYSTOSCOPY W/ URETERAL STENT PLACEMENT Left 3/2015     CYSTOSCOPY WITH STENT PLACEMENT Left 2/15/2018    [facility]    CYSTOSCOPY WITH STENT REMOVAL Left 5/31/2018    [facility]    CYSTOSCOPY WITH STENT REMOVAL Left 2/15/2018    [facility]    FULGURATION-BLADDER N/A 5/31/2018    [facility]    HYSTERECTOMY      Partial - still has ovaries    HYSTERECTOMY-VAGINAL-LAPAROSCOPIC (LAVH) Bilateral 5/26/2015    [facility] OR    KNEE SURGERY Left     lithotripsy  4/2012    LITHOTRIPSY, ESWL - Right distal ureteral calculus Right 9/6/2018    [facility]    OVARIAN CYST SURGERY  2006    PYELOGRAM, RETROGRADE Left 5/31/2018    [facility]     Family History   Problem Relation Age of Onset    No Known Problems Father     Hypertension Mother     Seizures Mother     Fibromyalgia Mother     No Known Problems Sister     Seizures Brother     Seizures Brother     No Known Problems Sister     No Known Problems Sister     Cancer Neg Hx     Heart disease Neg Hx     Kidney disease Neg Hx      Social History     Tobacco Use    Smoking status: Never Smoker    Smokeless tobacco: Never Used   Substance Use Topics    Alcohol use: No    Drug use: No     Review of Systems   Constitutional: Negative for fever.   Gastrointestinal: Negative for nausea and vomiting.   Genitourinary: Positive for dysuria, flank pain (right sided) and urgency.   Skin: Negative for color change.   Allergic/Immunologic: Negative for immunocompromised state.   Psychiatric/Behavioral: Negative for agitation.   All other systems reviewed and are negative.      Physical Exam     Initial Vitals   BP Pulse Resp Temp SpO2   -- -- -- -- --      MAP       --         Physical Exam    Nursing note and vitals reviewed.  Constitutional: She appears well-developed and well-nourished.  She is not diaphoretic. No distress.   HENT:   Head: Normocephalic and atraumatic.   Right Ear: External ear normal.   Left Ear: External ear normal.   Nose: Nose normal.   Mouth/Throat: Oropharynx is clear and moist.   Eyes: Conjunctivae and EOM are normal.   Neck: Normal range of motion.   Cardiovascular: Normal rate and regular rhythm.   Pulmonary/Chest: Breath sounds normal. No respiratory distress. She has no wheezes. She has no rhonchi. She has no rales. She exhibits no tenderness.   Abdominal: Soft. Bowel sounds are normal. She exhibits no distension. There is tenderness in the right lower quadrant and suprapubic area. There is CVA tenderness (right sided ). There is no rebound and no guarding.   Musculoskeletal: Normal range of motion.   Neurological: She is alert and oriented to person, place, and time.   Skin: Skin is warm and dry. No rash noted. No erythema.   Psychiatric: She has a normal mood and affect. Thought content normal.         ED Course   Procedures  Labs Reviewed   CBC W/ AUTO DIFFERENTIAL - Abnormal; Notable for the following components:       Result Value    Mono # 1.1 (*)     All other components within normal limits   COMPREHENSIVE METABOLIC PANEL - Abnormal; Notable for the following components:    BUN, Bld 18 (*)     All other components within normal limits   URINALYSIS, REFLEX TO URINE CULTURE - Abnormal; Notable for the following components:    Appearance, UA Cloudy (*)     Protein, UA 1+ (*)     Bilirubin (UA) 1+ (*)     Occult Blood UA Trace (*)     Nitrite, UA Positive (*)     Leukocytes, UA 3+ (*)     All other components within normal limits    Narrative:     Preferred Collection Type->Urine, Clean Catch   PREGNANCY TEST, URINE RAPID   URINALYSIS   URINALYSIS MICROSCOPIC          Imaging Results          CT Renal Stone Study ABD Pelvis WO (Final result)  Result time 01/04/19 22:27:15    Final result by Gareth Guillen MD (01/04/19 22:27:15)                 Impression:       There are tiny punctate nonobstructing stones in the left kidney.  No acute obstruction is demonstrated.    All CT scans at (this location) are performed using dose modulation techniques as appropriate to a performed exam including the following:  automated exposure control; adjustment of the mA and /or kV according to patient size (this includes techniques or standardized protocols for targeted exams where dose is matched to indication/reason for exam: i.e. extremities or head); use of iterative reconstruction technique.      Electronically signed by: Dex Jurado MD  Date:    01/04/2019  Time:    22:27             Narrative:    EXAMINATION:  CT RENAL STONE STUDY ABD PELVIS WO    CLINICAL HISTORY:  right flank pain;    TECHNIQUE:  Low dose axial images, sagittal and coronal reformations were obtained from the lung bases to the pubic symphysis.  Contrast was not administered.    COMPARISON:  11/20/2018    FINDINGS:  Lung bases are clear.    Within the abdomen, no free air is demonstrated.  Liver, gallbladder, spleen, pancreas, and adrenal glands appear free of focal abnormality.  The right kidney appears free of any suspicious new mass, calculus, or obstruction.  The right ureter is normal in its course and configuration.  Left kidney contains multiple chronic punctate stones.  No acute obstruction is demonstrated.  The left ureter appears free of calculus or obstructing lesion.  The bladder is normal in appearance.    Within the pelvis, no suspicious mass or fluid collection is demonstrated.  The colon, small bowel, and stomach appear free of acute abnormality.  A fat filled periumbilical hernia is present.  The bones appear intact.                               X-Ray Abdomen AP 1 View (KUB) (Final result)  Result time 01/04/19 22:14:51    Final result by Gareth Jurado MD (01/04/19 22:14:51)                 Impression:      No acute findings.      Electronically signed by: Dex Jurado  MD  Date:    01/04/2019  Time:    22:14             Narrative:    EXAMINATION:  XR ABDOMEN AP 1 VIEW    CLINICAL HISTORY:  Unspecified abdominal pain    TECHNIQUE:  AP View(s) of the abdomen was performed.    COMPARISON:  Comparison is made with previous studies    FINDINGS:  The bowel gas pattern is nonspecific.  No abnormal mass or suspicious calcifications are demonstrated.  The bones appear intact.                                 Medical Decision Making:   Initial Assessment:   Right sided flank pain that radiates to right groin   Differential Diagnosis:   Nephrolithiasis, hydronephrosis, pyelonephritis  Clinical Tests:   Lab Tests: Ordered and Reviewed  The following lab test(s) were unremarkable: CBC, CMP, Urinalysis and UPT  Radiological Study: Reviewed and Ordered  ED Management:  Patient presents to the ER for evaluation of right flank pain that radiates to the groin.  Positive Noble's punch with radiation to right lower quadrant.  UA shows evidence of UTI.  No concerning abnormalities on CBC or CMP.  Fluids, Toradol and Zofran given.  Patient's chest to decrease pain but still has some baseline nausea.  Phenergan was given with improvement of her symptoms.  CT shows no evidence of stone.  Symptoms and exam most consistent with pyelonephritis.  Patient will be given a course of antibiotics and pain medicine as well as nausea medicine and instructed to follow up with her urologist.  She was instructed to return with any new or worsening symptoms.                      Clinical Impression:   The primary encounter diagnosis was Pyelonephritis. A diagnosis of Right flank pain was also pertinent to this visit.                             Josette Díaz PA-C  01/04/19 [id number]

## 2019-01-07 ENCOUNTER — PATIENT MESSAGE (OUTPATIENT)
Dept: UROLOGY | Facility: CLINIC | Age: 39
End: 2019-01-07

## 2019-01-07 LAB — BACTERIA UR CULT: NORMAL

## 2019-01-07 RX ORDER — PROMETHAZINE HYDROCHLORIDE 25 MG/1
25 SUPPOSITORY RECTAL EVERY 6 HOURS PRN
Qty: 20 SUPPOSITORY | Refills: 2 | Status: SHIPPED | OUTPATIENT
Start: 2019-01-07 | End: 2019-01-14

## 2019-01-07 NOTE — PROGRESS NOTES
Patient given Rocephin injection while in ED and prescribed Keflex for outpatient therapy.  Susceptibilities pending.

## 2019-01-09 RX ORDER — OXYCODONE AND ACETAMINOPHEN 10; 325 MG/1; MG/1
1 TABLET ORAL EVERY 6 HOURS PRN
Qty: 18 TABLET | Refills: 0 | OUTPATIENT
Start: 2019-01-09

## 2019-01-25 ENCOUNTER — OFFICE VISIT (OUTPATIENT)
Dept: FAMILY MEDICINE | Facility: CLINIC | Age: 39
End: 2019-01-25
Payer: COMMERCIAL

## 2019-01-25 VITALS
OXYGEN SATURATION: 98 % | RESPIRATION RATE: 18 BRPM | SYSTOLIC BLOOD PRESSURE: 120 MMHG | BODY MASS INDEX: 37.87 KG/M2 | DIASTOLIC BLOOD PRESSURE: 70 MMHG | TEMPERATURE: 98 F | HEART RATE: 101 BPM | HEIGHT: 64 IN | WEIGHT: 221.81 LBS

## 2019-01-25 DIAGNOSIS — Z13.220 SCREENING CHOLESTEROL LEVEL: ICD-10-CM

## 2019-01-25 DIAGNOSIS — Z13.0 SCREENING FOR DEFICIENCY ANEMIA: ICD-10-CM

## 2019-01-25 DIAGNOSIS — Z87.01 HISTORY OF PNEUMONIA AS INDICATION FOR 23-POLYVALENT PNEUMOCOCCAL POLYSACCHARIDE VACCINE: ICD-10-CM

## 2019-01-25 DIAGNOSIS — G56.90 NEUROPATHY OF HAND, UNSPECIFIED LATERALITY: ICD-10-CM

## 2019-01-25 DIAGNOSIS — Z13.29 THYROID DISORDER SCREEN: ICD-10-CM

## 2019-01-25 DIAGNOSIS — G57.93 NEUROPATHY OF BOTH FEET: Primary | ICD-10-CM

## 2019-01-25 DIAGNOSIS — Z91.89 HISTORY OF PNEUMONIA AS INDICATION FOR 23-POLYVALENT PNEUMOCOCCAL POLYSACCHARIDE VACCINE: ICD-10-CM

## 2019-01-25 DIAGNOSIS — Z13.1 DIABETES MELLITUS SCREENING: ICD-10-CM

## 2019-01-25 DIAGNOSIS — Z23 NEED FOR TDAP VACCINATION: ICD-10-CM

## 2019-01-25 PROCEDURE — 90471 IMMUNIZATION ADMIN: CPT | Mod: S$GLB,,, | Performed by: NURSE PRACTITIONER

## 2019-01-25 PROCEDURE — 99214 PR OFFICE/OUTPT VISIT, EST, LEVL IV, 30-39 MIN: ICD-10-PCS | Mod: 25,S$GLB,, | Performed by: NURSE PRACTITIONER

## 2019-01-25 PROCEDURE — 90471 PNEUMOCOCCAL POLYSACCHARIDE VACCINE 23-VALENT =>2YO SQ IM: ICD-10-PCS | Mod: S$GLB,,, | Performed by: NURSE PRACTITIONER

## 2019-01-25 PROCEDURE — 90472 IMMUNIZATION ADMIN EACH ADD: CPT | Mod: S$GLB,,, | Performed by: NURSE PRACTITIONER

## 2019-01-25 PROCEDURE — 99999 PR PBB SHADOW E&M-EST. PATIENT-LVL V: ICD-10-PCS | Mod: PBBFAC,,, | Performed by: NURSE PRACTITIONER

## 2019-01-25 PROCEDURE — 90715 TDAP VACCINE 7 YRS/> IM: CPT | Mod: S$GLB,,, | Performed by: NURSE PRACTITIONER

## 2019-01-25 PROCEDURE — 99214 OFFICE O/P EST MOD 30 MIN: CPT | Mod: 25,S$GLB,, | Performed by: NURSE PRACTITIONER

## 2019-01-25 PROCEDURE — 3008F PR BODY MASS INDEX (BMI) DOCUMENTED: ICD-10-PCS | Mod: CPTII,S$GLB,, | Performed by: NURSE PRACTITIONER

## 2019-01-25 PROCEDURE — 90472 TDAP VACCINE GREATER THAN OR EQUAL TO 7YO IM: ICD-10-PCS | Mod: S$GLB,,, | Performed by: NURSE PRACTITIONER

## 2019-01-25 PROCEDURE — 90732 PNEUMOCOCCAL POLYSACCHARIDE VACCINE 23-VALENT =>2YO SQ IM: ICD-10-PCS | Mod: S$GLB,,, | Performed by: NURSE PRACTITIONER

## 2019-01-25 PROCEDURE — 99999 PR PBB SHADOW E&M-EST. PATIENT-LVL V: CPT | Mod: PBBFAC,,, | Performed by: NURSE PRACTITIONER

## 2019-01-25 PROCEDURE — 90732 PPSV23 VACC 2 YRS+ SUBQ/IM: CPT | Mod: S$GLB,,, | Performed by: NURSE PRACTITIONER

## 2019-01-25 PROCEDURE — 3008F BODY MASS INDEX DOCD: CPT | Mod: CPTII,S$GLB,, | Performed by: NURSE PRACTITIONER

## 2019-01-25 PROCEDURE — 90715 TDAP VACCINE GREATER THAN OR EQUAL TO 7YO IM: ICD-10-PCS | Mod: S$GLB,,, | Performed by: NURSE PRACTITIONER

## 2019-01-25 NOTE — PROGRESS NOTES
Subjective:       Patient ID: Lesa Leyva is a 38 y.o. female.    Chief Complaint: Medication Refill (refill on Lyrica)    Patient is a 38 year old white female here Peripheral Neuropathy to hands and feet treated by Neurology, Anxiety and depression treated by GYN MD, and Interstitial cystitis and personal history of Kidney stones treated by urologist that is here today with report she she is again having neuropathies to hand and feet since getting off of Lyrica.      Patient has Peripheral Neuropathies to hand and feet chronically.  She is established with Neurologist Dr. Ivory Baum who had her on Lyrica that was controlling symptoms BUT reports that she stopped the medication around 6 months ago because she thought it was causing weight gain but since she stopped the medication, she has not lost weight and the peripheral neuropathies returned.  She reports she would like to get back on Lyrica but she is unable to schedule with Dr. 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  She would like to change to a different neurologist.  She reports she was also tried on Amitriptyline and Celebrex in the past without relief.  Patient is currently taking Cymbalta 60 mg from her GYN MD, Dr. Leisa Tirado, for anxiety and depression.  I will get records from Dr. Ivory Baum with all testing and progress notes done and make appointment with [facility] for management.    Patient has not really had a PCP to follow preventative care and health maintenance in the past.  She is due to Tdap and Pneumonia vaccines - will address today.  And patient will return for fasting labs and WELLNESS exam during [place] break for screenings.        Current Outpatient Medications   Medication Sig Dispense Refill    acetaminophen (TYLENOL) 325 MG tablet Take 1 tablet (325 mg total) by mouth every 6 (six) hours as needed for Pain.      duloxetine (CYMBALTA) 60 MG capsule Take 60 mg  by mouth once daily.  6    HYDROcodone-acetaminophen (NORCO) 5-325 mg per tablet Take 1 tablet by mouth every 4 (four) hours as needed for Pain. 12 tablet 0    hyoscyamine (LEVSIN/SL) 0.125 mg Subl Place 1 tablet (0.125 mg total) under the tongue every 6 (six) hours as needed (Bladder Spasm). 120 tablet 11    ondansetron (ZOFRAN-ODT) 4 MG TbDL Take 1 tablet (4 mg total) by mouth every 6 (six) hours as needed. 20 tablet 0    tamsulosin (FLOMAX) 0.4 mg Cap Take 1 capsule (0.4 mg total) by mouth every evening. 30 capsule 1     No current facility-administered medications for this visit.        Past Medical History:   Diagnosis Date    Anxiety     Cystitis     Depression     Nephrolithiasis 11/20/2018    Neuropathy of both feet     Neuropathy of hand     Personal history of kidney stones 4/2012    has had several kidney stones and has bladder spasms    Urinary tract infection     Vaginal infection        Past Surgical History:   Procedure Laterality Date    BLADDER SUSPENSION  2013    bladder lifts    CYSTOSCOPY  5/26/2015    [facility]    CYSTOSCOPY W/ URETERAL STENT PLACEMENT Left 3/2015    CYSTOSCOPY WITH STENT PLACEMENT Left 2/15/2018    [facility]    CYSTOSCOPY WITH STENT REMOVAL Left 5/31/2018    [facility] n[facility]el [facility]    CYSTOSCOPY WITH STENT REMOVAL Left 2/15/2018    [facility]    FULGURATION-BLADDER N/A 5/31/2018    [facility]    HYSTERECTOMY      Partial - still has ovaries    HYSTERECTOMY-VAGINAL-LAPAROSCOPIC (LAVH) Bilateral 5/26/2015    [facility]    KNEE SURGERY Left     lithotripsy  4/2012    LITHOTRIPSY, ESWL - Right distal ureteral calculus Right 9/6/2018    [facility]    OVARIAN CYST SURGERY  2006    PYELOGRAM, RETROGRADE Left 5/31/2018    [facility]       Family History   Problem Relation Age of Onset    No Known Problems Father     Hypertension Mother     Seizures Mother     Fibromyalgia Mother     No Known Problems Sister     Seizures Brother     Seizures Brother     No Known Problems Sister     No Known Problems Sister     Cancer Neg Hx     Heart disease Neg Hx     Kidney disease Neg Hx        Social History     Socioeconomic History    Marital status:      Spouse name: None    Number of children: None    Years of education: None    Highest education level: None   Social Needs    Financial resource strain: None    Food insecurity - worry: None    Food insecurity - inability: None    Transportation needs - medical: None    Transportation needs - non-medical: None   Occupational History    Occupation: teacher     Employer: [employer]   Tobacco Use    Smoking status: Never Smoker    Smokeless tobacco: Never Used   Substance and Sexual Activity    Alcohol use: No    Drug use: No    Sexual activity: Yes     Partners: Male   Other Topics Concern    None   Social History Narrative    Lives with her  and 3 boys in [place]. 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        Review of Systems   Constitutional: Negative for appetite change, chills, fatigue, fever and unexpected weight change.   HENT: Negative for congestion, ear pain, mouth sores, nosebleeds, postnasal drip, rhinorrhea, sinus pressure, sneezing, sore throat, trouble swallowing and voice change.    Eyes: Negative for photophobia, pain, discharge, redness, itching and visual disturbance.   Respiratory: Negative for cough, chest tightness and shortness of breath.    Cardiovascular: Negative for chest pain, palpitations and leg swelling.   Gastrointestinal: Negative for abdominal pain, blood in stool, constipation, diarrhea, nausea and vomiting.   Genitourinary: Negative for dysuria, frequency,  "hematuria and urgency.   Musculoskeletal: Negative for arthralgias, back pain, joint swelling and myalgias.   Skin: Negative for color change and rash.   Allergic/Immunologic: Negative for immunocompromised state.   Neurological: Positive for numbness. Negative for dizziness, seizures, syncope, weakness and headaches.        Peripheral neuropathies to hand and feet - chronic.   Hematological: Negative for adenopathy. Does not bruise/bleed easily.   Psychiatric/Behavioral: Negative for agitation, dysphoric mood, sleep disturbance and suicidal ideas. The patient is not nervous/anxious.          Objective:     Vitals:    01/25/19 1443   BP: 120/70   BP Location: Right arm   Patient Position: Sitting   BP Method: Large (Manual)   Pulse: 101   Resp: 18   Temp: 98 °F (36.7 °C)   TempSrc: Oral   SpO2: 98%   Weight: 100.6 kg (221 lb 12.5 oz)   Height: 5' 4" (1.626 m)          Physical Exam   Constitutional: She is oriented to person, place, and time. She appears well-developed and well-nourished.   + obesity with Body mass index is 38.07 kg/m².     HENT:   Head: Normocephalic and atraumatic.   Right Ear: External ear normal.   Left Ear: External ear normal.   Nose: Nose normal.   Mouth/Throat: Oropharynx is clear and moist. No oropharyngeal exudate.   Eyes: EOM are normal. Pupils are equal, round, and reactive to light.   Neck: Normal range of motion. Neck supple. No tracheal deviation present. No thyromegaly present.   Cardiovascular: Normal rate, regular rhythm and normal heart sounds.   No murmur heard.  Pulmonary/Chest: Effort normal and breath sounds normal. No respiratory distress.   Abdominal: Soft. She exhibits no distension.   Musculoskeletal: Normal range of motion. She exhibits no edema.   Lymphadenopathy:     She has no cervical adenopathy.   Neurological: She is alert and oriented to person, place, and time. No cranial nerve deficit. Coordination normal.   Skin: Skin is warm and dry. No rash noted. "   Psychiatric: She has a normal mood and affect.         Assessment:         ICD-10-CM ICD-9-CM   1. Neuropathy of both feet G57.93 356.9   2. Neuropathy of hand, unspecified laterality G56.90 354.9   3. Screening for deficiency anemia Z13.0 V78.1   4. Thyroid disorder screen Z13.29 V77.0   5. Diabetes mellitus screening Z13.1 V77.1   6. Screening cholesterol level Z13.220 V77.91   7. Need for Tdap vaccination Z23 V06.1   8. History of pneumonia as indication for 23-polyvalent pneumococcal polysaccharide vaccine Z87.01 V03.82    Z91.89 V12.61       Plan:       Neuropathy of both feet  -  Advised patient that I will refer her to Neurologist to establish care in [place].  I will get records from previous neurologist to scan to media file so records are available to NEW Neurologist at time of visit.  Advised patient that I will wait for Neurology to adjust medications as she feels fit.  -     Ambulatory Referral to Neurology    Neuropathy of hand, unspecified laterality  -     Ambulatory Referral to Neurology    Screening for deficiency anemia  -     CBC auto differential; Future; Expected date: 01/25/2019    Thyroid disorder screen  -     TSH; Future; Expected date: 01/25/2019    Diabetes mellitus screening  -     Comprehensive metabolic panel; Future; Expected date: 01/25/2019    Screening cholesterol level  -     Lipid panel; Future; Expected date: 01/25/2019    Need for Tdap vaccination  -     Tdap Vaccine    History of pneumonia as indication for 23-polyvalent pneumococcal polysaccharide vaccine  -     Pneumococcal Polysaccharide Vaccine (23 Valent) (SQ/IM)      Follow-up in about 6 weeks (around 3/8/2019) for fasting labs and WELLNESS EXAM.        Medication List           Accurate as of 1/25/19  3:31 PM. If you have any questions, ask your nurse or doctor.               CONTINUE taking these medications    acetaminophen 325 MG tablet  Commonly known as:  TYLENOL  Take 1 tablet (325 mg total) by mouth every 6  (six) hours as needed for Pain.     DULoxetine 60 MG capsule  Commonly known as:  CYMBALTA     HYDROcodone-acetaminophen 5-325 mg per tablet  Commonly known as:  NORCO  Take 1 tablet by mouth every 4 (four) hours as needed for Pain.     hyoscyamine 0.125 mg Subl  Commonly known as:  LEVSIN/SL  Place 1 tablet (0.125 mg total) under the tongue every 6 (six) hours as needed (Bladder Spasm).     ondansetron 4 MG Tbdl  Commonly known as:  ZOFRAN-ODT  Take 1 tablet (4 mg total) by mouth every 6 (six) hours as needed.     tamsulosin 0.4 mg Cap  Commonly known as:  FLOMAX  Take 1 capsule (0.4 mg total) by mouth every evening.        STOP taking these medications    oxyCODONE-acetaminophen  mg per tablet  Commonly known as:  PERCOCET  Stopped by:  Miranda New NP

## 2019-02-07 ENCOUNTER — OFFICE VISIT (OUTPATIENT)
Dept: NEUROLOGY | Facility: CLINIC | Age: 39
End: 2019-02-07
Payer: COMMERCIAL

## 2019-02-07 VITALS
SYSTOLIC BLOOD PRESSURE: 135 MMHG | HEIGHT: 64 IN | BODY MASS INDEX: 37.68 KG/M2 | HEART RATE: 100 BPM | WEIGHT: 220.69 LBS | DIASTOLIC BLOOD PRESSURE: 89 MMHG

## 2019-02-07 DIAGNOSIS — F32.A ANXIETY AND DEPRESSION: ICD-10-CM

## 2019-02-07 DIAGNOSIS — F41.9 ANXIETY AND DEPRESSION: ICD-10-CM

## 2019-02-07 DIAGNOSIS — G62.9 NEUROPATHY: ICD-10-CM

## 2019-02-07 PROBLEM — R50.9 FEVER AND CHILLS: Status: RESOLVED | Noted: 2017-08-15 | Resolved: 2019-02-07

## 2019-02-07 PROBLEM — N12 PYELONEPHRITIS: Status: RESOLVED | Noted: 2017-08-15 | Resolved: 2019-02-07

## 2019-02-07 PROBLEM — R11.0 NAUSEA: Status: RESOLVED | Noted: 2017-07-18 | Resolved: 2019-02-07

## 2019-02-07 PROBLEM — R10.9 BILATERAL FLANK PAIN: Status: RESOLVED | Noted: 2018-09-04 | Resolved: 2019-02-07

## 2019-02-07 PROBLEM — R10.9 ACUTE RIGHT FLANK PAIN: Status: RESOLVED | Noted: 2018-11-20 | Resolved: 2019-02-07

## 2019-02-07 PROCEDURE — 99999 PR PBB SHADOW E&M-EST. PATIENT-LVL III: CPT | Mod: PBBFAC,,, | Performed by: PSYCHIATRY & NEUROLOGY

## 2019-02-07 PROCEDURE — 99999 PR PBB SHADOW E&M-EST. PATIENT-LVL III: ICD-10-PCS | Mod: PBBFAC,,, | Performed by: PSYCHIATRY & NEUROLOGY

## 2019-02-07 PROCEDURE — 99204 OFFICE O/P NEW MOD 45 MIN: CPT | Mod: S$GLB,,, | Performed by: PSYCHIATRY & NEUROLOGY

## 2019-02-07 PROCEDURE — 3008F BODY MASS INDEX DOCD: CPT | Mod: CPTII,S$GLB,, | Performed by: PSYCHIATRY & NEUROLOGY

## 2019-02-07 PROCEDURE — 99204 PR OFFICE/OUTPT VISIT, NEW, LEVL IV, 45-59 MIN: ICD-10-PCS | Mod: S$GLB,,, | Performed by: PSYCHIATRY & NEUROLOGY

## 2019-02-07 PROCEDURE — 3008F PR BODY MASS INDEX (BMI) DOCUMENTED: ICD-10-PCS | Mod: CPTII,S$GLB,, | Performed by: PSYCHIATRY & NEUROLOGY

## 2019-02-07 RX ORDER — PREGABALIN 25 MG/1
25 CAPSULE ORAL 2 TIMES DAILY
Qty: 60 CAPSULE | Refills: 5 | Status: SHIPPED | OUTPATIENT
Start: 2019-02-07 | End: 2019-02-14

## 2019-02-07 NOTE — LETTER
February 7, 2019      [place]  [address]  [address]  [place] [address]           [facility]  [address]  [place] [address]  Phone: [phone]  Fax: [phone]          Patient: Lesa Leyva   MR Number: [id number]   YOB: 1980   Date of Visit: 2/7/2019       Dear Miranda New:    Thank you for referring Lesa Leyva to me for evaluation. Attached you will find relevant portions of my assessment and plan of care.    If you have questions, please do not hesitate to call me. I look forward to following Lesa Leyva along with you.    Sincerely,    Good Guillen MD    Enclosure  CC:  No Recipients    If you would like to receive this communication electronically, please contact [email or web address] or [phone] to request more information on [facility] access.    For providers and/or their staff who would like to refer a patient to [facility], please contact us through our one-stop-shop provider referral line, [facility] , at [phone].    If you feel you have received this communication in error or would no longer like to receive these types of communications, please e-mail [email or web address]

## 2019-02-07 NOTE — PROGRESS NOTES
[facility] NEUROLOGY  [facility]    Date: 2/7/19  Patient Name: Lesa Leyva   MRN: [id number]   PCP: Leisa Tirado  Referring Provider: Miranda New NP    Assessment:   Lesa Leyva is a 38 y.o. female presenting with reported longstanding peripheral polyneuropathy (type unknown.  Will contact patient's PCP to obtain records detailing prior workup.  Will reinitiate trial of Lyrica 25 mg t.i.d. with dose increase as needed.    Plan:     Problem List Items Addressed This Visit        Neuro    Neuropathy    Overview     Type unknown, obtaining OSH Records to include lab workup and EMG data         Current Assessment & Plan     -- repeat trial of lyrica, starting back at 25 mg TID               Good Guillen MD  [facility]   Department of Neurology    Patient note was created using MModal Dictation.  Any errors in syntax or even information may not have been identified and edited on initial review prior to signing this note.  Subjective:   Patient seen in consultation at the request of Miranda New NP for the evaluation of neuropathy.  A copy of this note will be sent to the referring physician.        HPI:   Ms. Lesa Leyva is a 38 y.o. female  presenting for evaluation of peripheral polyneuropathy.  The patient presents today to transfer care from Dr. Baum an outside neurologist.  Unfortunately, we have received no outside records for the patient and the patient brings no records with her today.  The patient states that she was diagnosed with peripheral polyneuropathy several years ago also has a family history notable for a similar peripheral polyneuropathy in her mother.  She states she has undergone extensive lab testing and EMG but unfortunately is unaware with these results showed does bring them with her today.  She has been managed on multiple medications for neuropathic pain including amitriptyline, Celebrex, Lyrica, and Cymbalta.  The  patient felt that Cymbalta has caused her to gain weight.  She states that thus far, Lyrica has been most helpful in controlling her pain which she describes as a burning and tingling sensation.  She initially thought it was also causing her to gain weight however she continued to gain weight despite stopping it.  She would like to resume this.  She states that pain largely affects her feet to the ankles as well as her fingertips but extends as high as her hips and elbows on occasion fluctuating over the course of several days.         PAST MEDICAL HISTORY:  Past Medical History:   Diagnosis Date    Anxiety     Cystitis     Depression     Nephrolithiasis 11/20/2018    Neuropathy of both feet     Neuropathy of hand     Personal history of kidney stones 4/2012    has had several kidney stones and has bladder spasms    Urinary tract infection     Vaginal infection        PAST SURGICAL HISTORY:  Past Surgical History:   Procedure Laterality Date    BLADDER SUSPENSION  2013    bladder lifts    CYSTOSCOPY  5/26/2015    [facility]    CYSTOSCOPY W/ URETERAL STENT PLACEMENT Left 3/2015    CYSTOSCOPY WITH STENT PLACEMENT Left 2/15/2018    [facility]    CYSTOSCOPY WITH STENT REMOVAL Left 5/31/2018    [facility]    CYSTOSCOPY WITH STENT REMOVAL Left 2/15/2018    [facility]    FULGURATION-BLADDER N/A 5/31/2018    [facility]    HYSTERECTOMY      Partial - still has ovaries    HYSTERECTOMY-VAGINAL-LAPAROSCOPIC (LAVH) Bilateral 5/26/2015    [facility]    KNEE SURGERY Left     lithotripsy  4/2012    LITHOTRIPSY, ESWL - Right distal ureteral calculus Right 9/6/2018    [facility]    OVARIAN CYST SURGERY  2006    PYELOGRAM, RETROGRADE Left 5/31/2018    Per[facility] by [facility]  [facility] at [facility] OR       CURRENT MEDS:  Current Outpatient Medications   Medication Sig Dispense Refill    acetaminophen (TYLENOL) 325 MG tablet Take 1 tablet (325 mg total) by mouth every 6 (six) hours as needed for Pain.      duloxetine (CYMBALTA) 60 MG capsule Take 60 mg by mouth once daily.  6    hyoscyamine (LEVSIN/SL) 0.125 mg Subl Place 1 tablet (0.125 mg total) under the tongue every 6 (six) hours as needed (Bladder Spasm). 120 tablet 11    ondansetron (ZOFRAN-ODT) 4 MG TbDL Take 1 tablet (4 mg total) by mouth every 6 (six) hours as needed. 20 tablet 0    tamsulosin (FLOMAX) 0.4 mg Cap Take 1 capsule (0.4 mg total) by mouth every evening. 30 capsule 1    HYDROcodone-acetaminophen (NORCO) 5-325 mg per tablet Take 1 tablet by mouth every 4 (four) hours as needed for Pain. 12 tablet 0    pregabalin (LYRICA) 25 MG capsule Take 1 capsule (25 mg total) by mouth 2 (two) times daily. 60 capsule 5     No current facility-administered medications for this visit.        ALLERGIES:  Review of patient's allergies indicates:   Allergen Reactions    Peaches [peach (prunus persica)] Hives, Itching and Swelling    Tramadol Other (See Comments)     headache       FAMILY HISTORY:  Family History   Problem Relation Age of Onset    No Known Problems Father     Hypertension Mother     Seizures Mother     Fibromyalgia Mother     No Known Problems Sister     Seizures Brother     Seizures Brother     No Known Problems Sister     No Known Problems Sister     Cancer Neg Hx     Heart disease Neg Hx     Kidney disease Neg Hx        SOCIAL HISTORY:  Social History     Tobacco Use    Smoking status: Never Smoker    Smokeless tobacco: Never Used   Substance Use Topics    Alcohol use: No    Drug use: No       Review of Systems:  12 system review of systems is negative except for the symptoms mentioned in HPI.      Objective:     Vitals:    02/07/19 1520   BP: 135/89   Pulse: 100   Weight: 100.1 kg (220 lb 10.9 oz)  "  Height: 5' 4" (1.626 m)     General: NAD, well nourished   Eyes: no tearing, discharge, no erythema   ENT: moist mucous membranes of the oral cavity, nares patent    Neck: Supple, full range of motion  Cardiovascular: Warm and well perfused, pulses equal and symmetrical  Lungs: Normal work of breathing, normal chest wall excursions  Skin: No rash, lesions, or breakdown on exposed skin  Psychiatry: Mood and affect are appropriate   Abdomen: soft, non tender, non distended  Extremeties: No cyanosis, clubbing or edema.    Neurological   MENTAL STATUS: Alert and oriented to person, place, and time. Attention and concentration within normal limits. Speech without dysarthria, able to name and repeat without difficulty. Recent and remote memory within normal limits   CRANIAL NERVES: Visual fields intact. PERRL. EOMI. Facial sensation intact. Face symmetrical. Hearing grossly intact. Full shoulder shrug bilaterally. Tongue protrudes midline   SENSORY: Sensation is reduced to ST, temp, vibration, and LT to ankles and fingertips bilaterally. Positive Romberg.  MOTOR: Normal bulk and tone.  5/5 deltoid, biceps, triceps, interosseous, hand  bilaterally. 5/5 iliopsoas, knee extension/flexion, foot dorsi/plantarflexion bilaterally.    REFLEXES: Symmetric and 1+ throughout.   CEREBELLAR/COORDINATION/GAIT: Gait steady with normal arm swing and stride length. . Finger to nose intact. Normal rapid alternating movements.           "

## 2019-02-14 ENCOUNTER — PATIENT MESSAGE (OUTPATIENT)
Dept: NEUROLOGY | Facility: CLINIC | Age: 39
End: 2019-02-14

## 2019-02-14 RX ORDER — PREGABALIN 50 MG/1
50 CAPSULE ORAL 3 TIMES DAILY
Qty: 270 CAPSULE | Refills: 1 | Status: SHIPPED | OUTPATIENT
Start: 2019-02-14 | End: 2019-02-20

## 2019-02-20 ENCOUNTER — PATIENT MESSAGE (OUTPATIENT)
Dept: NEUROLOGY | Facility: CLINIC | Age: 39
End: 2019-02-20

## 2019-02-20 RX ORDER — PREGABALIN 75 MG/1
75 CAPSULE ORAL 3 TIMES DAILY
Qty: 270 CAPSULE | Refills: 1 | Status: SHIPPED | OUTPATIENT
Start: 2019-02-20 | End: 2019-09-30 | Stop reason: SDUPTHER

## 2019-03-04 ENCOUNTER — LAB VISIT (OUTPATIENT)
Dept: LAB | Facility: HOSPITAL | Age: 39
End: 2019-03-04
Attending: NURSE PRACTITIONER
Payer: COMMERCIAL

## 2019-03-04 DIAGNOSIS — Z13.0 SCREENING FOR DEFICIENCY ANEMIA: ICD-10-CM

## 2019-03-04 DIAGNOSIS — Z13.220 SCREENING CHOLESTEROL LEVEL: ICD-10-CM

## 2019-03-04 DIAGNOSIS — Z13.1 DIABETES MELLITUS SCREENING: ICD-10-CM

## 2019-03-04 DIAGNOSIS — Z13.29 THYROID DISORDER SCREEN: ICD-10-CM

## 2019-03-04 LAB
ALBUMIN SERPL BCP-MCNC: 4.1 G/DL
ALP SERPL-CCNC: 105 U/L
ALT SERPL W/O P-5'-P-CCNC: 15 U/L
ANION GAP SERPL CALC-SCNC: 5 MMOL/L
AST SERPL-CCNC: 20 U/L
BASOPHILS # BLD AUTO: 0.02 K/UL
BASOPHILS NFR BLD: 0.3 %
BILIRUB SERPL-MCNC: 0.4 MG/DL
BUN SERPL-MCNC: 16 MG/DL
CALCIUM SERPL-MCNC: 9.5 MG/DL
CHLORIDE SERPL-SCNC: 104 MMOL/L
CHOLEST SERPL-MCNC: 208 MG/DL
CHOLEST/HDLC SERPL: 5.3 {RATIO}
CO2 SERPL-SCNC: 30 MMOL/L
CREAT SERPL-MCNC: 0.87 MG/DL
DIFFERENTIAL METHOD: ABNORMAL
EOSINOPHIL # BLD AUTO: 0.1 K/UL
EOSINOPHIL NFR BLD: 1.3 %
ERYTHROCYTE [DISTWIDTH] IN BLOOD BY AUTOMATED COUNT: 14.3 %
EST. GFR  (AFRICAN AMERICAN): >60 ML/MIN/1.73 M^2
EST. GFR  (NON AFRICAN AMERICAN): >60 ML/MIN/1.73 M^2
GLUCOSE SERPL-MCNC: 94 MG/DL
HCT VFR BLD AUTO: 39.4 %
HDLC SERPL-MCNC: 39 MG/DL
HDLC SERPL: 18.8 %
HGB BLD-MCNC: 12.2 G/DL
LDLC SERPL CALC-MCNC: 152.4 MG/DL
LYMPHOCYTES # BLD AUTO: 1.7 K/UL
LYMPHOCYTES NFR BLD: 27.5 %
MCH RBC QN AUTO: 26.6 PG
MCHC RBC AUTO-ENTMCNC: 31 G/DL
MCV RBC AUTO: 86 FL
MONOCYTES # BLD AUTO: 0.5 K/UL
MONOCYTES NFR BLD: 8.5 %
NEUTROPHILS # BLD AUTO: 3.7 K/UL
NEUTROPHILS NFR BLD: 61.2 %
NONHDLC SERPL-MCNC: 169 MG/DL
PLATELET # BLD AUTO: 295 K/UL
PMV BLD AUTO: 12.6 FL
POTASSIUM SERPL-SCNC: 4.1 MMOL/L
PROT SERPL-MCNC: 7.6 G/DL
RBC # BLD AUTO: 4.58 M/UL
SODIUM SERPL-SCNC: 139 MMOL/L
TRIGL SERPL-MCNC: 83 MG/DL
TSH SERPL DL<=0.005 MIU/L-ACNC: 0.79 UIU/ML
WBC # BLD AUTO: 6 K/UL

## 2019-03-04 PROCEDURE — 80053 COMPREHEN METABOLIC PANEL: CPT | Mod: PO

## 2019-03-04 PROCEDURE — 85025 COMPLETE CBC W/AUTO DIFF WBC: CPT | Mod: PO

## 2019-03-04 PROCEDURE — 84443 ASSAY THYROID STIM HORMONE: CPT | Mod: PO

## 2019-03-04 PROCEDURE — 80061 LIPID PANEL: CPT

## 2019-03-04 PROCEDURE — 36415 COLL VENOUS BLD VENIPUNCTURE: CPT | Mod: PO

## 2019-03-11 ENCOUNTER — TELEPHONE (OUTPATIENT)
Dept: FAMILY MEDICINE | Facility: CLINIC | Age: 39
End: 2019-03-11

## 2019-03-11 NOTE — TELEPHONE ENCOUNTER
----- Message from Miranda New NP sent at 3/11/2019 11:53 AM CDT -----  Patient needs appointment for wellness exam to over lab results that were already drawn.- please call patient to set up appt

## 2019-03-12 ENCOUNTER — TELEPHONE (OUTPATIENT)
Dept: FAMILY MEDICINE | Facility: CLINIC | Age: 39
End: 2019-03-12

## 2019-03-12 ENCOUNTER — PATIENT MESSAGE (OUTPATIENT)
Dept: UROLOGY | Facility: CLINIC | Age: 39
End: 2019-03-12

## 2019-03-14 ENCOUNTER — PATIENT MESSAGE (OUTPATIENT)
Dept: FAMILY MEDICINE | Facility: CLINIC | Age: 39
End: 2019-03-14

## 2019-03-14 ENCOUNTER — TELEPHONE (OUTPATIENT)
Dept: FAMILY MEDICINE | Facility: CLINIC | Age: 39
End: 2019-03-14

## 2019-04-11 NOTE — PROGRESS NOTES
Another letter mailed to patient asking her to please follow through and schedule her WELLNESS/ANNUAL PHYSICAL to review lab results.

## 2019-05-29 ENCOUNTER — OFFICE VISIT (OUTPATIENT)
Dept: FAMILY MEDICINE | Facility: CLINIC | Age: 39
End: 2019-05-29
Payer: COMMERCIAL

## 2019-05-29 VITALS
SYSTOLIC BLOOD PRESSURE: 120 MMHG | WEIGHT: 217.69 LBS | HEART RATE: 105 BPM | BODY MASS INDEX: 37.16 KG/M2 | HEIGHT: 64 IN | OXYGEN SATURATION: 96 % | DIASTOLIC BLOOD PRESSURE: 74 MMHG

## 2019-05-29 DIAGNOSIS — F41.9 ANXIETY AND DEPRESSION: ICD-10-CM

## 2019-05-29 DIAGNOSIS — E78.49 OTHER HYPERLIPIDEMIA: ICD-10-CM

## 2019-05-29 DIAGNOSIS — H65.03 BILATERAL ACUTE SEROUS OTITIS MEDIA, RECURRENCE NOT SPECIFIED: ICD-10-CM

## 2019-05-29 DIAGNOSIS — Z90.710 H/O: HYSTERECTOMY: ICD-10-CM

## 2019-05-29 DIAGNOSIS — T75.3XXA MOTION SICKNESS, INITIAL ENCOUNTER: ICD-10-CM

## 2019-05-29 DIAGNOSIS — R31.9 HEMATURIA, UNSPECIFIED TYPE: Primary | ICD-10-CM

## 2019-05-29 DIAGNOSIS — F32.A ANXIETY AND DEPRESSION: ICD-10-CM

## 2019-05-29 DIAGNOSIS — N39.0 RECURRENT UTI (URINARY TRACT INFECTION): ICD-10-CM

## 2019-05-29 DIAGNOSIS — G62.9 NEUROPATHY: ICD-10-CM

## 2019-05-29 LAB
BILIRUB SERPL-MCNC: ABNORMAL MG/DL
BLOOD, POC UA: ABNORMAL
GLUCOSE UR QL STRIP: ABNORMAL
KETONES UR QL STRIP: ABNORMAL
LEUKOCYTE ESTERASE URINE, POC: ABNORMAL
NITRITE, POC UA: ABNORMAL
PH, POC UA: 5
PROTEIN, POC: ABNORMAL
SPECIFIC GRAVITY, POC UA: 1.01
UROBILINOGEN, POC UA: ABNORMAL

## 2019-05-29 PROCEDURE — 87077 CULTURE AEROBIC IDENTIFY: CPT

## 2019-05-29 PROCEDURE — 3008F BODY MASS INDEX DOCD: CPT | Mod: CPTII,S$GLB,, | Performed by: INTERNAL MEDICINE

## 2019-05-29 PROCEDURE — 87086 URINE CULTURE/COLONY COUNT: CPT

## 2019-05-29 PROCEDURE — 87186 SC STD MICRODIL/AGAR DIL: CPT

## 2019-05-29 PROCEDURE — 87184 SC STD DISK METHOD PER PLATE: CPT

## 2019-05-29 PROCEDURE — 87088 URINE BACTERIA CULTURE: CPT

## 2019-05-29 PROCEDURE — 99214 PR OFFICE/OUTPT VISIT, EST, LEVL IV, 30-39 MIN: ICD-10-PCS | Mod: 25,S$GLB,, | Performed by: INTERNAL MEDICINE

## 2019-05-29 PROCEDURE — 81000 POCT URINALYSIS: ICD-10-PCS | Mod: S$GLB,,, | Performed by: INTERNAL MEDICINE

## 2019-05-29 PROCEDURE — 3008F PR BODY MASS INDEX (BMI) DOCUMENTED: ICD-10-PCS | Mod: CPTII,S$GLB,, | Performed by: INTERNAL MEDICINE

## 2019-05-29 PROCEDURE — 96372 THER/PROPH/DIAG INJ SC/IM: CPT | Mod: S$GLB,,, | Performed by: INTERNAL MEDICINE

## 2019-05-29 PROCEDURE — 96372 PR INJECTION,THERAP/PROPH/DIAG2ST, IM OR SUBCUT: ICD-10-PCS | Mod: S$GLB,,, | Performed by: INTERNAL MEDICINE

## 2019-05-29 PROCEDURE — 99999 PR PBB SHADOW E&M-EST. PATIENT-LVL III: ICD-10-PCS | Mod: PBBFAC,,, | Performed by: INTERNAL MEDICINE

## 2019-05-29 PROCEDURE — 99999 PR PBB SHADOW E&M-EST. PATIENT-LVL III: CPT | Mod: PBBFAC,,, | Performed by: INTERNAL MEDICINE

## 2019-05-29 PROCEDURE — 81000 URINALYSIS NONAUTO W/SCOPE: CPT | Mod: S$GLB,,, | Performed by: INTERNAL MEDICINE

## 2019-05-29 PROCEDURE — 99214 OFFICE O/P EST MOD 30 MIN: CPT | Mod: 25,S$GLB,, | Performed by: INTERNAL MEDICINE

## 2019-05-29 RX ORDER — LEVOFLOXACIN 500 MG/1
500 TABLET, FILM COATED ORAL DAILY
Qty: 10 TABLET | Refills: 0 | Status: SHIPPED | OUTPATIENT
Start: 2019-05-29 | End: 2019-06-23 | Stop reason: ALTCHOICE

## 2019-05-29 RX ORDER — TRIAMCINOLONE ACETONIDE 40 MG/ML
40 INJECTION, SUSPENSION INTRA-ARTICULAR; INTRAMUSCULAR ONCE
Status: COMPLETED | OUTPATIENT
Start: 2019-05-29 | End: 2019-05-29

## 2019-05-29 RX ADMIN — TRIAMCINOLONE ACETONIDE 40 MG: 40 INJECTION, SUSPENSION INTRA-ARTICULAR; INTRAMUSCULAR at 10:05

## 2019-05-29 NOTE — PROGRESS NOTES
NEW PATIENT VISIT INTERNAL MEDICINE    Patient Active Problem List   Diagnosis    Menorrhagia    Painful bladder spasm    Ureteral stricture, left    Bladder spasm    Interstitial cystitis    Urinary frequency    Pelvic pain in female    Elevated blood pressure    Neuropathy    Hydronephrosis concurrent with and due to ureteral stricture    Dysuria    Cystitis cystica    Recurrent UTI (urinary tract infection)    Nephrolithiasis    Anxiety and depression    Other hyperlipidemia    H/O: hysterectomy       CC:   Chief Complaint   Patient presents with    Establish Care    Hematuria     with pain started 5/28/19    motion sickness     x 2 months       HPI: Lesa Leyva   is a 39 y.o. female   I have reviewed the PMH, SH and FH for this patient. This is a new patient to me.She  has a past medical history of Anxiety, Cystitis, Depression, Nephrolithiasis (11/20/2018), Neuropathy of both feet, Neuropathy of hand, Personal history of kidney stones (4/2012), Urinary tract infection, and Vaginal infection.She has a neuropathy of her hands and feet of unknown origin. She sees a neurologist for this. She is on lyrica and duloxetine. She is here because she has been having a sensation of motion sickness when she rides in a car for about 2 months. She is also dizzy when she turns her head. She is not having sinus drainage or cough. She reports a similar symptom when she was pregnant with her last baby. She had her last baby 5 years ago. She has had a hysterectomy now, so she could not be pregnant. She has been prescribed flonase for allergies in the past, but she has not taken it in about 6 months. She had labs done in March to prepare for annual visit. Her labs were all good except for high cholesterol. Her ldl was 152.         ROS: Review of Systems   Constitutional: Negative for activity change, chills, fatigue, fever and unexpected weight change.   HENT: Negative for congestion, ear discharge, ear  pain, rhinorrhea, sore throat and trouble swallowing.    Eyes: Negative for discharge, redness, itching and visual disturbance.   Respiratory: Negative for cough, chest tightness, shortness of breath and wheezing.    Cardiovascular: Negative for chest pain, palpitations and leg swelling.   Gastrointestinal: Negative for abdominal pain, blood in stool, constipation, diarrhea, nausea and vomiting.   Endocrine: Negative for cold intolerance, heat intolerance, polydipsia and polyuria.   Genitourinary: Positive for dysuria and hematuria. Negative for difficulty urinating, flank pain, frequency and menstrual problem.   Musculoskeletal: Negative for arthralgias, back pain, joint swelling, myalgias and neck pain.   Skin: Negative for color change and rash.   Neurological: Negative for dizziness, tremors, numbness and headaches.   Psychiatric/Behavioral: Negative for confusion, dysphoric mood and sleep disturbance. The patient is not nervous/anxious.        PMHX:   Past Medical History:   Diagnosis Date    Anxiety     Cystitis     Depression     Nephrolithiasis 11/20/2018    Neuropathy of both feet     Neuropathy of hand     Personal history of kidney stones 4/2012    has had several kidney stones and has bladder spasms    Urinary tract infection     Vaginal infection        PSHX:   Past Surgical History:   Procedure Laterality Date    BLADDER SUSPENSION  2013    bladder lifts    CYSTOSCOPY  5/26/2015    [facility]    CYSTOSCOPY W/ URETERAL STENT PLACEMENT Left 3/2015    CYSTOSCOPY WITH STENT PLACEMENT Left 2/15/2018    [facility]    CYSTOSCOPY WITH STENT REMOVAL Left 5/31/2018    [facility] OR    CYSTOSCOPY WITH STENT REMOVAL Left 2/15/2018    [facility] OR    FULGURATION-BLADDER N/A 5/31/2018    [facility]    HYSTERECTOMY      Partial - still has  ovaries    HYSTERECTOMY-VAGINAL-LAPAROSCOPIC (LAVH) Bilateral 5/26/2015    [facility]    KNEE SURGERY Left     lithotripsy  4/2012    LITHOTRIPSY, ESWL - Right distal ureteral calculus Right 9/6/2018    [facility] OR    OVARIAN CYST SURGERY  2006    PYELOGRAM, RETROGRADE Left 5/31/2018    [facility]       [facility]   Family History   Problem Relation Age of Onset    Hypertension Father     Hypertension Mother     Seizures Mother     Fibromyalgia Mother     No Known Problems Sister     Seizures Brother     Seizures Brother     No Known Problems Sister     No Known Problems Sister     Cancer Neg Hx     Heart disease Neg Hx     Kidney disease Neg Hx        SOCHX:   Social History     Socioeconomic History    Marital status:      Spouse name: martine    Number of children: 3    Years of education: Not on file    Highest education level: Not on file   Occupational History    Occupation: teacher     Employer: [employer]   Social Needs    Financial resource strain: Not hard at all    Food insecurity:     Worry: Never true     Inability: Never true    Transportation needs:     Medical: No     Non-medical: No   Tobacco Use    Smoking status: Never Smoker    Smokeless tobacco: Never Used   Substance and Sexual Activity    Alcohol use: No     Frequency: Monthly or less     Drinks per session: 1 or 2     Binge frequency: Never    Drug use: No    Sexual activity: Yes     Partners: Male   Lifestyle    Physical activity:     Days per week: 2 days     Minutes per session: 30 min    Stress: Very much   Relationships    Social connections:     Talks on phone: More than three times a week     Gets together: Once a week     Attends Restorationist service: Not on file     Active member of club or organization: No     Attends meetings of clubs or organizations: Never     Relationship status:  "   Other Topics Concern    Not on file   Social History Narrative    Lives with her  and 3 boys in [place]. 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 She has 2 dogs, yorkie, and granados doodle.       ALLERGIES:   Review of patient's allergies indicates:   Allergen Reactions    Peaches [peach (prunus persica)] Hives, Itching and Swelling    Tramadol Other (See Comments)     headache       MEDS:   Current Outpatient Medications:     acetaminophen (TYLENOL) 325 MG tablet, Take 1 tablet (325 mg total) by mouth every 6 (six) hours as needed for Pain., Disp: , Rfl:     duloxetine (CYMBALTA) 60 MG capsule, Take 60 mg by mouth once daily., Disp: , Rfl: 6    ondansetron (ZOFRAN-ODT) 4 MG TbDL, Take 1 tablet (4 mg total) by mouth every 6 (six) hours as needed., Disp: 20 tablet, Rfl: 0    pregabalin (LYRICA) 75 MG capsule, Take 1 capsule (75 mg total) by mouth 3 (three) times daily., Disp: 270 capsule, Rfl: 1    hyoscyamine (LEVSIN/SL) 0.125 mg Subl, Place 1 tablet (0.125 mg total) under the tongue every 6 (six) hours as needed (Bladder Spasm)., Disp: 120 tablet, Rfl: 11    levoFLOXacin (LEVAQUIN) 500 MG tablet, Take 1 tablet (500 mg total) by mouth once daily., Disp: 10 tablet, Rfl: 0    OBJECTIVE:   Vitals:    05/29/19 1029   BP: 120/74   BP Location: Left arm   Patient Position: Sitting   BP Method: Medium (Manual)   Pulse: 105   SpO2: 96%   Weight: 98.8 kg (217 lb 11.3 oz)   Height: 5' 4" (1.626 m)     Body mass index is 37.37 kg/m².    Physical Exam   Constitutional: She appears well-developed and well-nourished. She does not have a sickly appearance.   HENT:   Head: Normocephalic and atraumatic.   Right Ear: External ear normal. Tympanic membrane is bulging. Tympanic membrane is not erythematous. A middle ear effusion is present.   Left Ear: External ear normal. Tympanic membrane is bulging. Tympanic membrane is not erythematous. A middle ear effusion is present.   Nose: No " rhinorrhea. Right sinus exhibits no maxillary sinus tenderness and no frontal sinus tenderness. Left sinus exhibits no maxillary sinus tenderness and no frontal sinus tenderness.   Mouth/Throat: No posterior oropharyngeal edema or posterior oropharyngeal erythema. No tonsillar exudate.   Eyes: Pupils are equal, round, and reactive to light. Conjunctivae and EOM are normal. Right eye exhibits no discharge. Left eye exhibits no discharge. Right conjunctiva is not injected. Left conjunctiva is not injected.   Neck: No thyromegaly present.   Cardiovascular: Normal rate, regular rhythm, normal heart sounds and intact distal pulses.   No murmur heard.  Pulmonary/Chest: Effort normal and breath sounds normal. She has no wheezes. She has no rhonchi. She has no rales.   Abdominal: Bowel sounds are normal. She exhibits no distension. There is no tenderness.   Musculoskeletal: She exhibits no edema or tenderness.   Lymphadenopathy:     She has no cervical adenopathy.   Neurological: She displays normal reflexes. No cranial nerve deficit.   Skin: Skin is warm and dry. No lesion and no rash noted.   Psychiatric: She has a normal mood and affect. Her behavior is normal. Her mood appears not anxious. Her speech is not rapid and/or pressured. She is not agitated and not aggressive. She does not exhibit a depressed mood.         Depression Patient Health Questionnaire 5/29/2019 11/20/2018 6/20/2018 5/15/2018 1/31/2018   In the last two weeks how often have you had little interest or pleasure in doing things 0 0 0 0 0   In the last two weeks how often have you felt down, depressed or hopeless 0 0 0 0 0   PHQ-2 Total Score 0 0 0 0 0       PERTINENT RESULTS:   UA showed leukocytes and nitrates    ASSESSMENT:  Problem List Items Addressed This Visit        Neuro    Neuropathy - on duloxetine and lyrica. She sees neurology.     Overview     Type unknown, obtaining OSH Records to include lab workup and EMG data            Psychiatric     Anxiety and depression - she is on duloxetine.     Overview     Managed by her OBGYN            Cardiac/Vascular    Other hyperlipidemia - her cholesterol was high. She wants to work on diet and recheck in a few months.        Renal/    Recurrent UTI (urinary tract infection) - she has had frequent uti's. We will treat with levaquin and send urine culture.     Relevant Orders    Urine culture (Completed)    H/O: hysterectomy - stable.       Other Visit Diagnoses     Hematuria, unspecified type    -  Primary - we will treat UTI and send urine culture.     Relevant Orders    POCT URINALYSIS (Completed)    Motion sickness, initial encounter     - we will treat ear infection and uti and recheck in 2 weeks.       Bilateral acute serous otitis media, recurrence not specified    - treat with steroid shot and levaquin.           PLAN:   Orders Placed This Encounter    Urine culture    POCT URINALYSIS    triamcinolone acetonide injection 40 mg    levoFLOXacin (LEVAQUIN) 500 MG tablet           Follow-up with me in 2 weeks. .

## 2019-05-29 NOTE — PATIENT INSTRUCTIONS
We have reviewed your prescription medications. I believe your motion sickness is due to ear infection. I am treating this with levaquin and a steroid shot. You also have a UTI. The levaquin should treat it. We will also send a urine culture. We reviewed your last labs. They all looked good except for high cholesterol. Try to get cholesterol down with diet. We will recheck this in 3 months and consider medication if still high. If motion sickness does not resolve in about 2 weeks, come back to see me.

## 2019-06-02 ENCOUNTER — PATIENT MESSAGE (OUTPATIENT)
Dept: INTERNAL MEDICINE | Facility: CLINIC | Age: 39
End: 2019-06-02

## 2019-06-03 LAB — BACTERIA UR CULT: NORMAL

## 2019-06-05 ENCOUNTER — PATIENT MESSAGE (OUTPATIENT)
Dept: FAMILY MEDICINE | Facility: CLINIC | Age: 39
End: 2019-06-05

## 2019-06-18 ENCOUNTER — TELEPHONE (OUTPATIENT)
Dept: FAMILY MEDICINE | Facility: CLINIC | Age: 39
End: 2019-06-18

## 2019-06-18 NOTE — TELEPHONE ENCOUNTER
Spoke with patient's mother, Shantel. Shantel states that the patient is experiencing left ear pain and drainage. And the pain has been keeping her up at night. Shantel states that the patient would like a sooner appointment and would like to be seen by Dr Cabral. Pt was unavailable for this call due to being in a meeting at work. Offered first available urgent visit with Dr Cabral which is not until Thursday, Shantel accepted on behalf of patient. I did offer for patient to be seen sooner by another provider, Shantel refused.

## 2019-06-18 NOTE — TELEPHONE ENCOUNTER
----- Message from Alicia Israel sent at 6/18/2019  8:59 AM CDT -----  Patient's mother, Shantel, called.  No. [phone]    Patient has a follow up appointment on 7/8/19.  She is having ear pain with a drainage.  Can patient be seen sooner.   Please call.

## 2019-06-20 ENCOUNTER — OFFICE VISIT (OUTPATIENT)
Dept: FAMILY MEDICINE | Facility: CLINIC | Age: 39
End: 2019-06-20
Payer: COMMERCIAL

## 2019-06-20 VITALS
BODY MASS INDEX: 37.42 KG/M2 | OXYGEN SATURATION: 99 % | TEMPERATURE: 98 F | HEIGHT: 64 IN | WEIGHT: 219.19 LBS | SYSTOLIC BLOOD PRESSURE: 106 MMHG | DIASTOLIC BLOOD PRESSURE: 72 MMHG | HEART RATE: 98 BPM

## 2019-06-20 DIAGNOSIS — H66.005 RECURRENT ACUTE SUPPURATIVE OTITIS MEDIA WITHOUT SPONTANEOUS RUPTURE OF LEFT TYMPANIC MEMBRANE: Primary | ICD-10-CM

## 2019-06-20 DIAGNOSIS — J20.9 ACUTE BRONCHITIS, UNSPECIFIED ORGANISM: ICD-10-CM

## 2019-06-20 PROCEDURE — 3008F PR BODY MASS INDEX (BMI) DOCUMENTED: ICD-10-PCS | Mod: CPTII,S$GLB,, | Performed by: INTERNAL MEDICINE

## 2019-06-20 PROCEDURE — 99213 OFFICE O/P EST LOW 20 MIN: CPT | Mod: S$GLB,,, | Performed by: INTERNAL MEDICINE

## 2019-06-20 PROCEDURE — 99999 PR PBB SHADOW E&M-EST. PATIENT-LVL III: CPT | Mod: PBBFAC,,, | Performed by: INTERNAL MEDICINE

## 2019-06-20 PROCEDURE — 3008F BODY MASS INDEX DOCD: CPT | Mod: CPTII,S$GLB,, | Performed by: INTERNAL MEDICINE

## 2019-06-20 PROCEDURE — 99213 PR OFFICE/OUTPT VISIT, EST, LEVL III, 20-29 MIN: ICD-10-PCS | Mod: S$GLB,,, | Performed by: INTERNAL MEDICINE

## 2019-06-20 PROCEDURE — 99999 PR PBB SHADOW E&M-EST. PATIENT-LVL III: ICD-10-PCS | Mod: PBBFAC,,, | Performed by: INTERNAL MEDICINE

## 2019-06-20 RX ORDER — PREDNISONE 10 MG/1
10 TABLET ORAL 2 TIMES DAILY
Qty: 10 TABLET | Refills: 0 | Status: SHIPPED | OUTPATIENT
Start: 2019-06-20 | End: 2019-06-25

## 2019-06-20 RX ORDER — CEFDINIR 300 MG/1
300 CAPSULE ORAL 2 TIMES DAILY
Qty: 20 CAPSULE | Refills: 0 | Status: SHIPPED | OUTPATIENT
Start: 2019-06-20 | End: 2019-06-30

## 2019-06-20 RX ORDER — BENZONATATE 200 MG/1
200 CAPSULE ORAL 3 TIMES DAILY PRN
Qty: 30 CAPSULE | Refills: 0 | Status: SHIPPED | OUTPATIENT
Start: 2019-06-20 | End: 2019-06-30

## 2019-06-20 NOTE — PATIENT INSTRUCTIONS
We have reviewed your prescription medications. We will treat recurrent ear infection with cefdinir. I am treating bronchitis with prednisone and a cough suppressant. If ear does not clear up, we will need to refer to ENT. Let me know if you need treatment for a yeast infection after these meds. Take a daily probiotic to prevent yeast infection.

## 2019-06-23 NOTE — PROGRESS NOTES
Subjective:       Patient ID: Lesa Leyva is a 39 y.o. female.    Chief Complaint: Otalgia; Cough; and URI     I have reviewed the PMH,  and  for this patient. He  has a past medical history of Anxiety, Cystitis, Depression, Nephrolithiasis (11/20/2018), Neuropathy of both feet, Neuropathy of hand, Personal history of kidney stones (4/2012), Urinary tract infection, and Vaginal infection.  She was seen a few weeks ago with dizziness. She had an ear infection which I treated with levaquin . ! Week later, her ear started bothering her again. She has been coughing 3-4 weeks . It is keeping her up at night. She is not coughing up anything. She did have a steroid shot when she had her ear infection treated. She has been having s asore throat and a runny nose. She has been taking sudafed and mucinex.     Active Ambulatory Problems     Diagnosis Date Noted    Menorrhagia 05/26/2015    Painful bladder spasm 08/26/2015    Ureteral stricture, left 09/21/2015    Bladder spasm 09/21/2015    Interstitial cystitis 09/21/2015    Urinary frequency 11/02/2015    Pelvic pain in female 11/02/2015    Elevated blood pressure 07/27/2016    Neuropathy 07/27/2016    Hydronephrosis concurrent with and due to ureteral stricture 01/27/2017    Dysuria 08/15/2017    Cystitis cystica 05/15/2018    Recurrent UTI (urinary tract infection) 06/20/2018    Nephrolithiasis 11/20/2018    Anxiety and depression 02/07/2019    Other hyperlipidemia 05/29/2019    H/O: hysterectomy 05/29/2019     Resolved Ambulatory Problems     Diagnosis Date Noted    Flank pain 06/01/2013    Active labor 06/13/2013    Gastroenteritis 11/06/2014    Urinary tract infection 09/21/2015    Hematuria 11/02/2015    Left flank pain 01/04/2016    Atypical pneumonia 07/27/2016    Acute cystitis with hematuria 09/09/2016    Chills (without fever) 10/07/2016    Nausea 07/18/2017    Fever and chills 08/15/2017    Pyelonephritis 08/15/2017     Bilateral flank pain 09/04/2018    Acute right flank pain 11/20/2018     Past Medical History:   Diagnosis Date    Anxiety     Cystitis     Depression     Nephrolithiasis 11/20/2018    Neuropathy of both feet     Neuropathy of hand     Personal history of kidney stones 4/2012    Urinary tract infection     Vaginal infection          MEDICATIONS:  Current Outpatient Medications:     acetaminophen (TYLENOL) 325 MG tablet, Take 1 tablet (325 mg total) by mouth every 6 (six) hours as needed for Pain., Disp: , Rfl:     duloxetine (CYMBALTA) 60 MG capsule, Take 60 mg by mouth once daily., Disp: , Rfl: 6    hyoscyamine (LEVSIN/SL) 0.125 mg Subl, Place 1 tablet (0.125 mg total) under the tongue every 6 (six) hours as needed (Bladder Spasm)., Disp: 120 tablet, Rfl: 11    levoFLOXacin (LEVAQUIN) 500 MG tablet, Take 1 tablet (500 mg total) by mouth once daily., Disp: 10 tablet, Rfl: 0    ondansetron (ZOFRAN-ODT) 4 MG TbDL, Take 1 tablet (4 mg total) by mouth every 6 (six) hours as needed., Disp: 20 tablet, Rfl: 0    pregabalin (LYRICA) 75 MG capsule, Take 1 capsule (75 mg total) by mouth 3 (three) times daily., Disp: 270 capsule, Rfl: 1    benzonatate (TESSALON) 200 MG capsule, Take 1 capsule (200 mg total) by mouth 3 (three) times daily as needed for Cough., Disp: 30 capsule, Rfl: 0    cefdinir (OMNICEF) 300 MG capsule, Take 1 capsule (300 mg total) by mouth 2 (two) times daily. for 10 days, Disp: 20 capsule, Rfl: 0    predniSONE (DELTASONE) 10 MG tablet, Take 1 tablet (10 mg total) by mouth 2 (two) times daily. for 5 days, Disp: 10 tablet, Rfl: 0      HEALTH MAINTENANCE:   Health Maintenance   Topic Date Due    Influenza Vaccine  08/01/2019    TETANUS VACCINE  01/25/2029    Lipid Panel  Completed       Review of Systems   Constitutional: Negative for chills, fatigue and fever.   HENT: Positive for ear pain, rhinorrhea and sore throat. Negative for congestion and ear discharge.    Eyes: Negative for  discharge, redness and itching.   Respiratory: Positive for cough. Negative for chest tightness, shortness of breath and wheezing.    Cardiovascular: Negative for chest pain, palpitations and leg swelling.   Gastrointestinal: Negative for abdominal pain, constipation, diarrhea, nausea and vomiting.   Endocrine: Negative for cold intolerance and heat intolerance.   Genitourinary: Negative for dysuria, flank pain, frequency and hematuria.   Musculoskeletal: Negative for arthralgias, back pain, joint swelling and myalgias.   Skin: Negative for color change and rash.   Neurological: Negative for dizziness, tremors, numbness and headaches.   Psychiatric/Behavioral: Negative for dysphoric mood and sleep disturbance. The patient is not nervous/anxious.        Objective:          A1C:      CBC:  Recent Labs   Lab 09/08/16  0946 05/05/17  0701 06/14/17  1122 02/12/18  1303 03/21/18  1855 05/29/18  1040 06/08/18  1317 09/05/18  1427 11/20/18  0937 01/04/19  2025 03/04/19  0908   WBC 7.23 5.28 7.58 6.88 15.59 H 7.18 6.69 6.89 7.93 10.30 6.00   RBC 4.79 4.74 4.59 4.88 4.35 5.03 4.79 4.28 4.95 4.73 4.58   Hemoglobin 12.9 12.7 12.1 12.0 11.0 L 12.9 12.5 11.5 L 13.5 12.9 12.2   Hematocrit 39.5 39.4 38.1 39.1 34.5 L 40.8 39.1 36.3 L 42.0 40.1 39.4   Platelets 306 323 274 357 H 273 333 328 389 H 361 H 321 295   Mean Corpuscular Volume 83 83 83 80 L 79 L 81 L 82 85 85 85 86   Mean Corpuscular Hemoglobin 26.9 L 26.8 L 26.4 L 24.6 L 25.3 L 25.6 L 26.1 L 26.9 L 27.3 27.3 26.6 L   Mean Corpuscular Hemoglobin Conc 32.7 32.2 31.8 L 30.7 L 31.9 L 31.6 L 32.0 31.7 L 32.1 32.2 31.0 L     CMP:  Recent Labs   Lab 09/08/16  0946 05/05/17  0701  03/21/18  1855  06/08/18  1317  01/04/19 2025 03/04/19  0908   Glucose 94 108   < > 96   < > 101   < > 97 94   Calcium 9.9 9.4   < > 9.9   < > 10.0   < > 9.7 9.5   Albumin 4.3 4.3  --  3.7  --  4.5  --  4.5 4.1   Total Protein 8.0 7.9  --  8.3  --  8.3  --  8.2 7.6   Sodium 141 144   < > 134 L   < > 144    < > 139 139   Potassium 4.0 3.7   < > 3.5   < > 3.9   < > 3.7 4.1   CO2 26 27   < > 24   < > 28   < > 27 30 H   Chloride 105 107   < > 100   < > 104   < > 104 104   BUN, Bld 16 14   < > 10   < > 17   < > 18 H 16   Alkaline Phosphatase 114 108  --  118  --  122  --  113 105   ALT 17 39  --  11  --  24  --  17 15   AST 22 35  --  15  --  29  --  20 20   Total Bilirubin 0.4 0.6  --  0.4  --  0.4  --  0.3 0.4    < > = values in this interval not displayed.     LIPIDS:  Recent Labs   Lab 05/05/17  0701 03/04/19  0908   TSH 0.889 0.786   HDL  --  39 L   Cholesterol  --  208 H   Triglycerides  --  83   LDL Cholesterol  --  152.4   Hdl/Cholesterol Ratio  --  18.8 L   Non-HDL Cholesterol  --  169   Total Cholesterol/HDL Ratio  --  5.3 H     TSH:  Recent Labs   Lab 05/05/17  0701 03/04/19  0908   TSH 0.889 0.786           Physical Exam   Constitutional: She appears well-developed and well-nourished. She does not have a sickly appearance.   HENT:   Head: Normocephalic and atraumatic.   Right Ear: External ear normal. Tympanic membrane is not erythematous. A middle ear effusion is present.   Left Ear: External ear normal. Tympanic membrane is not erythematous.  No middle ear effusion.   Nose: No rhinorrhea. Right sinus exhibits no maxillary sinus tenderness and no frontal sinus tenderness. Left sinus exhibits no maxillary sinus tenderness and no frontal sinus tenderness.   Mouth/Throat: Posterior oropharyngeal erythema present. No posterior oropharyngeal edema. No tonsillar exudate.   Eyes: Pupils are equal, round, and reactive to light. Conjunctivae and EOM are normal. Right eye exhibits no discharge. Left eye exhibits no discharge. Right conjunctiva is not injected. Left conjunctiva is not injected.   Neck: No thyromegaly present.   Cardiovascular: Normal rate, regular rhythm, normal heart sounds and intact distal pulses.   No murmur heard.  Pulmonary/Chest: Effort normal. No tachypnea. No respiratory distress. She has no  wheezes. She has rhonchi. She has no rales.   Abdominal: Bowel sounds are normal. She exhibits no distension. There is no tenderness.   Musculoskeletal: She exhibits no edema or tenderness.   Lymphadenopathy:     She has no cervical adenopathy.   Neurological: She displays normal reflexes. No cranial nerve deficit.   Skin: Skin is warm and dry. No lesion and no rash noted.   Psychiatric: She has a normal mood and affect. Her behavior is normal. Her mood appears not anxious. Her speech is not rapid and/or pressured. She is not agitated and not aggressive. She does not exhibit a depressed mood.             Assessment and Plan:     Problem List Items Addressed This Visit     None      Visit Diagnoses     Recurrent acute suppurative otitis media without spontaneous rupture of left tympanic membrane    -  Primary - treat with cefdinir and prednisone.       Acute bronchitis, unspecified organism     - treat with prednisone. Tessalon perles.           Orders Placed This Encounter    predniSONE (DELTASONE) 10 MG tablet    cefdinir (OMNICEF) 300 MG capsule    benzonatate (TESSALON) 200 MG capsule         Follow-up with me if not getting better.

## 2019-09-30 RX ORDER — PREGABALIN 75 MG/1
CAPSULE ORAL
Qty: 90 CAPSULE | Refills: 3 | Status: SHIPPED | OUTPATIENT
Start: 2019-09-30 | End: 2020-01-08

## 2020-01-06 ENCOUNTER — PATIENT OUTREACH (OUTPATIENT)
Dept: ADMINISTRATIVE | Facility: OTHER | Age: 40
End: 2020-01-06

## 2020-01-08 ENCOUNTER — OFFICE VISIT (OUTPATIENT)
Dept: NEUROLOGY | Facility: CLINIC | Age: 40
End: 2020-01-08
Payer: COMMERCIAL

## 2020-01-08 VITALS
SYSTOLIC BLOOD PRESSURE: 125 MMHG | BODY MASS INDEX: 37.41 KG/M2 | HEART RATE: 107 BPM | DIASTOLIC BLOOD PRESSURE: 89 MMHG | WEIGHT: 219.13 LBS | HEIGHT: 64 IN

## 2020-01-08 DIAGNOSIS — R29.898 HAND WEAKNESS: ICD-10-CM

## 2020-01-08 DIAGNOSIS — G62.9 POLYNEUROPATHY: Primary | ICD-10-CM

## 2020-01-08 PROCEDURE — 3008F PR BODY MASS INDEX (BMI) DOCUMENTED: ICD-10-PCS | Mod: CPTII,S$GLB,, | Performed by: PSYCHIATRY & NEUROLOGY

## 2020-01-08 PROCEDURE — 99999 PR PBB SHADOW E&M-EST. PATIENT-LVL III: ICD-10-PCS | Mod: PBBFAC,,, | Performed by: PSYCHIATRY & NEUROLOGY

## 2020-01-08 PROCEDURE — 99999 PR PBB SHADOW E&M-EST. PATIENT-LVL III: CPT | Mod: PBBFAC,,, | Performed by: PSYCHIATRY & NEUROLOGY

## 2020-01-08 PROCEDURE — 3008F BODY MASS INDEX DOCD: CPT | Mod: CPTII,S$GLB,, | Performed by: PSYCHIATRY & NEUROLOGY

## 2020-01-08 PROCEDURE — 99214 OFFICE O/P EST MOD 30 MIN: CPT | Mod: S$GLB,,, | Performed by: PSYCHIATRY & NEUROLOGY

## 2020-01-08 PROCEDURE — 99214 PR OFFICE/OUTPT VISIT, EST, LEVL IV, 30-39 MIN: ICD-10-PCS | Mod: S$GLB,,, | Performed by: PSYCHIATRY & NEUROLOGY

## 2020-01-08 RX ORDER — PREGABALIN 100 MG/1
100 CAPSULE ORAL 2 TIMES DAILY
Qty: 180 CAPSULE | Refills: 1 | Status: SHIPPED | OUTPATIENT
Start: 2020-01-08 | End: 2020-11-27

## 2020-01-08 NOTE — PROGRESS NOTES
[facility] NEUROLOGY  [facility], [place]    Date: 1/8/20  Patient Name: Lesa Leyva   MRN: [id number]   PCP: Tita Cabral  Referring Provider: No ref. provider found    Assessment:   Lesa Leyva is a 39 y.o. female Presenting in follow-up for an unknown type of peripheral polyneuropathy now with worsening hand weakness and pain.  Will repeat EMG/nerve conduction study due to both progression of symptoms and unavailability of patient's prior results.  Will increase Lyrica to 100 mg t.i.d. today in interim.    Plan:     Problem List Items Addressed This Visit     None      Visit Diagnoses     Polyneuropathy    -  Primary    Relevant Orders    EMG W/ ULTRASOUND AND NERVE CONDUCTION TEST 2 Extremities    Hand weakness        Relevant Orders    EMG W/ ULTRASOUND AND NERVE CONDUCTION TEST 2 Extremities          Good Guillen MD  [facility]   Department of Neurology    Patient note was created using Mones[employer] Dictation.  Any errors in syntax or even information may not have been identified and edited on initial review prior to signing this note.  Subjective:        HPI:   Ms. Lesa Leyva is a 39 y.o. female presenting in follow-up for chronic polyneuropathy.  The patient is following up after last being seen in February 2019.  She reports that she had been doing well until approximately a month ago when she began to develop debilitating pain in both of her hands bilaterally.  She states Lyrica is only partially controlling it.  She states her hands are now so weak she is unable to complaint basic tasks at home.  She states she is unable to even cut her meet when eating a meal.  On exam today, she has difficulty even spreading her fingers.    PAST MEDICAL HISTORY:  Past Medical History:   Diagnosis Date    Anxiety     Cystitis     Depression     Nephrolithiasis 11/20/2018    Neuropathy of both feet     Neuropathy of hand     Personal history of kidney stones 4/2012     has had several kidney stones and has bladder spasms    Urinary tract infection     Vaginal infection        PAST SURGICAL HISTORY:  Past Surgical History:   Procedure Laterality Date    BLADDER FULGURATION N/A 5/31/2018    Procedure: FULGURATION-BLADDER;  Surgeon: Angel Hernandez MD;  Location: [facility];  Service: Urology;  Laterality: N/A;    BLADDER SUSPENSION  2013    bladder lifts    CYSTOSCOPY W/ URETERAL STENT PLACEMENT Left 3/2015    CYSTOSCOPY W/ URETERAL STENT REMOVAL Left 5/31/2018    Procedure: CYSTOSCOPY WITH STENT REMOVAL;  Surgeon: Angel Hernandez MD;  Location: [facility] OR;  Service: Urology;  Laterality: Left;    EXTRACORPOREAL SHOCK WAVE LITHOTRIPSY Right 9/6/2018    Procedure: LITHOTRIPSY, ESWL - Right distal ureteral calculus;  Surgeon: Angel Hernandez MD;  Location: [facility] OR;  Service: Urology;  Laterality: Right;    HYSTERECTOMY      Partial - still has ovaries    KNEE SURGERY Left     lithotripsy  4/2012    OVARIAN CYST SURGERY  2006    RETROGRADE PYELOGRAPHY Left 5/31/2018    Procedure: PYELOGRAM, RETROGRADE;  Surgeon: Angel Hernandez MD;  Location: [facility];  Service: Urology;  Laterality: Left;       CURRENT MEDS:  Current Outpatient Medications   Medication Sig Dispense Refill    acetaminophen (TYLENOL) 325 MG tablet Take 1 tablet (325 mg total) by mouth every 6 (six) hours as needed for Pain.      duloxetine (CYMBALTA) 60 MG capsule Take 60 mg by mouth once daily.  6    hyoscyamine (LEVSIN/SL) 0.125 mg Subl Place 1 tablet (0.125 mg total) under the tongue every 6 (six) hours as needed (Bladder Spasm). 120 tablet 11    ondansetron (ZOFRAN-ODT) 4 MG TbDL Take 1 tablet (4 mg total) by mouth every 6 (six) hours as needed. 20 tablet 0    pregabalin (LYRICA) 100 MG capsule Take 1 capsule (100 mg total) by mouth 2 (two) times daily. 180 capsule 1     No current facility-administered medications for this visit.        ALLERGIES:  Review of patient's allergies indicates:  "  Allergen Reactions    Peaches [peach (prunus persica)] Hives, Itching and Swelling    Tramadol Other (See Comments)     headache       FAMILY HISTORY:  Family History   Problem Relation Age of Onset    Hypertension Father     Hypertension Mother     Seizures Mother     Fibromyalgia Mother     No Known Problems Sister     Seizures Brother     Seizures Brother     No Known Problems Sister     No Known Problems Sister     Cancer Neg Hx     Heart disease Neg Hx     Kidney disease Neg Hx        SOCIAL HISTORY:  Social History     Tobacco Use    Smoking status: Never Smoker    Smokeless tobacco: Never Used   Substance Use Topics    Alcohol use: No     Frequency: Monthly or less     Drinks per session: 1 or 2     Binge frequency: Never    Drug use: No       Review of Systems:  12 system review of systems is negative except for the symptoms mentioned in HPI.      Objective:     Vitals:    01/08/20 1626   BP: 125/89   Pulse: 107   Weight: 99.4 kg (219 lb 2.2 oz)   Height: 5' 4" (1.626 m)     General: NAD, well nourished   Eyes: no tearing, discharge, no erythema   ENT: moist mucous membranes of the oral cavity, nares patent    Neck: Supple, full range of motion  Cardiovascular: Warm and well perfused, pulses equal and symmetrical  Lungs: Normal work of breathing, normal chest wall excursions  Skin: No rash, lesions, or breakdown on exposed skin  Psychiatry: Mood and affect are appropriate   Abdomen: soft, non tender, non distended  Extremeties: No cyanosis, clubbing or edema.    Neurological   MENTAL STATUS: Alert and oriented to person, place, and time. Attention and concentration within normal limits. Speech without dysarthria. Recent and remote memory within normal limits   CRANIAL NERVES: Visual fields intact. PERRL. EOMI. Facial sensation intact. Face symmetrical. Hearing grossly intact. Full shoulder shrug bilaterally. Tongue protrudes midline   SENSORY: Sensation is reduced to ST, temp, vibration, " and LT to ankles and fingertips bilaterally. Positive Romberg.  MOTOR: Normal bulk and tone.  5/5 deltoid, biceps, triceps, 2/5 interosseous, hand  bilaterally. 5/5 iliopsoas, knee extension/flexion, foot dorsi/plantarflexion bilaterally.    REFLEXES: Symmetric and 1+ throughout.   CEREBELLAR/COORDINATION/GAIT: Gait steady with normal arm swing and stride length. Finger to nose intact. Normal rapid alternating movements.

## 2020-01-10 ENCOUNTER — HOSPITAL ENCOUNTER (EMERGENCY)
Facility: HOSPITAL | Age: 40
Discharge: HOME | End: 2020-01-10
Attending: EMERGENCY MEDICINE
Payer: COMMERCIAL

## 2020-01-10 VITALS
BODY MASS INDEX: 36.7 KG/M2 | TEMPERATURE: 99 F | OXYGEN SATURATION: 98 % | DIASTOLIC BLOOD PRESSURE: 87 MMHG | HEART RATE: 115 BPM | HEIGHT: 64 IN | SYSTOLIC BLOOD PRESSURE: 135 MMHG | RESPIRATION RATE: 20 BRPM | WEIGHT: 215 LBS

## 2020-01-10 DIAGNOSIS — N39.0 URINARY TRACT INFECTION WITH HEMATURIA, SITE UNSPECIFIED: ICD-10-CM

## 2020-01-10 DIAGNOSIS — N20.1 URETEROLITHIASIS: Primary | ICD-10-CM

## 2020-01-10 DIAGNOSIS — R31.9 URINARY TRACT INFECTION WITH HEMATURIA, SITE UNSPECIFIED: ICD-10-CM

## 2020-01-10 LAB
ALBUMIN SERPL BCP-MCNC: 4.3 G/DL (ref 3.5–5.2)
ALP SERPL-CCNC: 102 U/L (ref 38–126)
ALT SERPL W/O P-5'-P-CCNC: 19 U/L (ref 10–44)
ANION GAP SERPL CALC-SCNC: 7 MMOL/L (ref 8–16)
AST SERPL-CCNC: 23 U/L (ref 15–46)
BACTERIA #/AREA URNS AUTO: ABNORMAL /HPF
BASOPHILS # BLD AUTO: 0.05 K/UL (ref 0–0.2)
BASOPHILS NFR BLD: 0.4 % (ref 0–1.9)
BILIRUB SERPL-MCNC: 0.3 MG/DL (ref 0.1–1)
BILIRUB UR QL STRIP: NEGATIVE
BUN SERPL-MCNC: 15 MG/DL (ref 7–17)
CALCIUM SERPL-MCNC: 9.4 MG/DL (ref 8.7–10.5)
CHLORIDE SERPL-SCNC: 106 MMOL/L (ref 95–110)
CLARITY UR REFRACT.AUTO: ABNORMAL
CO2 SERPL-SCNC: 23 MMOL/L (ref 23–29)
COLOR UR AUTO: YELLOW
CREAT SERPL-MCNC: 0.73 MG/DL (ref 0.5–1.4)
DIFFERENTIAL METHOD: ABNORMAL
EOSINOPHIL # BLD AUTO: 0.1 K/UL (ref 0–0.5)
EOSINOPHIL NFR BLD: 1.1 % (ref 0–8)
ERYTHROCYTE [DISTWIDTH] IN BLOOD BY AUTOMATED COUNT: 13.8 % (ref 11.5–14.5)
EST. GFR  (AFRICAN AMERICAN): >60 ML/MIN/1.73 M^2
EST. GFR  (NON AFRICAN AMERICAN): >60 ML/MIN/1.73 M^2
GLUCOSE SERPL-MCNC: 95 MG/DL (ref 70–110)
GLUCOSE UR QL STRIP: NEGATIVE
HCT VFR BLD AUTO: 39 % (ref 37–48.5)
HGB BLD-MCNC: 12.7 G/DL (ref 12–16)
HGB UR QL STRIP: ABNORMAL
IMM GRANULOCYTES # BLD AUTO: 0.11 K/UL (ref 0–0.04)
IMM GRANULOCYTES NFR BLD AUTO: 0.9 % (ref 0–0.5)
KETONES UR QL STRIP: NEGATIVE
LACTATE SERPL-SCNC: 1.4 MMOL/L (ref 0.5–2.2)
LEUKOCYTE ESTERASE UR QL STRIP: ABNORMAL
LIPASE SERPL-CCNC: 331 U/L (ref 23–300)
LYMPHOCYTES # BLD AUTO: 1.5 K/UL (ref 1–4.8)
LYMPHOCYTES NFR BLD: 12.5 % (ref 18–48)
MCH RBC QN AUTO: 27.7 PG (ref 27–31)
MCHC RBC AUTO-ENTMCNC: 32.6 G/DL (ref 32–36)
MCV RBC AUTO: 85 FL (ref 82–98)
MICROSCOPIC COMMENT: ABNORMAL
MONOCYTES # BLD AUTO: 1 K/UL (ref 0.3–1)
MONOCYTES NFR BLD: 8.4 % (ref 4–15)
NEUTROPHILS # BLD AUTO: 9.2 K/UL (ref 1.8–7.7)
NEUTROPHILS NFR BLD: 76.7 % (ref 38–73)
NITRITE UR QL STRIP: POSITIVE
NRBC BLD-RTO: 0 /100 WBC
PH UR STRIP: 6 [PH] (ref 5–8)
PLATELET # BLD AUTO: 287 K/UL (ref 150–350)
PMV BLD AUTO: 11.3 FL (ref 9.2–12.9)
POTASSIUM SERPL-SCNC: 3.9 MMOL/L (ref 3.5–5.1)
PROT SERPL-MCNC: 7.8 G/DL (ref 6–8.4)
PROT UR QL STRIP: ABNORMAL
RBC # BLD AUTO: 4.59 M/UL (ref 4–5.4)
RBC #/AREA URNS AUTO: 3 /HPF (ref 0–4)
SODIUM SERPL-SCNC: 136 MMOL/L (ref 136–145)
SP GR UR STRIP: 1.01 (ref 1–1.03)
URN SPEC COLLECT METH UR: ABNORMAL
UROBILINOGEN UR STRIP-ACNC: NEGATIVE EU/DL
WBC # BLD AUTO: 11.94 K/UL (ref 3.9–12.7)
WBC #/AREA URNS AUTO: 100 /HPF (ref 0–5)

## 2020-01-10 PROCEDURE — 96375 TX/PRO/DX INJ NEW DRUG ADDON: CPT | Mod: ER

## 2020-01-10 PROCEDURE — 63600175 PHARM REV CODE 636 W HCPCS: Mod: ER | Performed by: EMERGENCY MEDICINE

## 2020-01-10 PROCEDURE — 83605 ASSAY OF LACTIC ACID: CPT | Mod: ER

## 2020-01-10 PROCEDURE — 81000 URINALYSIS NONAUTO W/SCOPE: CPT | Mod: ER

## 2020-01-10 PROCEDURE — 83690 ASSAY OF LIPASE: CPT | Mod: ER

## 2020-01-10 PROCEDURE — 85025 COMPLETE CBC W/AUTO DIFF WBC: CPT | Mod: ER

## 2020-01-10 PROCEDURE — 87086 URINE CULTURE/COLONY COUNT: CPT | Mod: ER

## 2020-01-10 PROCEDURE — 80053 COMPREHEN METABOLIC PANEL: CPT | Mod: ER

## 2020-01-10 PROCEDURE — 96376 TX/PRO/DX INJ SAME DRUG ADON: CPT | Mod: ER

## 2020-01-10 PROCEDURE — 96365 THER/PROPH/DIAG IV INF INIT: CPT | Mod: ER

## 2020-01-10 PROCEDURE — 99285 EMERGENCY DEPT VISIT HI MDM: CPT | Mod: 25,ER

## 2020-01-10 RX ORDER — MORPHINE SULFATE 4 MG/ML
4 INJECTION, SOLUTION INTRAMUSCULAR; INTRAVENOUS
Status: COMPLETED | OUTPATIENT
Start: 2020-01-10 | End: 2020-01-10

## 2020-01-10 RX ORDER — KETOROLAC TROMETHAMINE 30 MG/ML
15 INJECTION, SOLUTION INTRAMUSCULAR; INTRAVENOUS
Status: COMPLETED | OUTPATIENT
Start: 2020-01-10 | End: 2020-01-10

## 2020-01-10 RX ORDER — SODIUM CHLORIDE 9 MG/ML
1000 INJECTION, SOLUTION INTRAVENOUS
Status: COMPLETED | OUTPATIENT
Start: 2020-01-10 | End: 2020-01-10

## 2020-01-10 RX ORDER — HYDROCODONE BITARTRATE AND ACETAMINOPHEN 5; 325 MG/1; MG/1
1 TABLET ORAL EVERY 4 HOURS PRN
Qty: 10 TABLET | Refills: 0 | Status: SHIPPED | OUTPATIENT
Start: 2020-01-10 | End: 2020-02-07

## 2020-01-10 RX ORDER — IBUPROFEN 600 MG/1
600 TABLET ORAL EVERY 6 HOURS PRN
Qty: 20 TABLET | Refills: 0 | Status: SHIPPED | OUTPATIENT
Start: 2020-01-10 | End: 2021-04-13

## 2020-01-10 RX ORDER — TAMSULOSIN HYDROCHLORIDE 0.4 MG/1
0.4 CAPSULE ORAL DAILY
Qty: 10 CAPSULE | Refills: 0 | Status: SHIPPED | OUTPATIENT
Start: 2020-01-10 | End: 2020-02-07

## 2020-01-10 RX ORDER — CIPROFLOXACIN 500 MG/1
500 TABLET ORAL 2 TIMES DAILY
Qty: 20 TABLET | Refills: 0 | Status: SHIPPED | OUTPATIENT
Start: 2020-01-10 | End: 2020-01-20

## 2020-01-10 RX ORDER — ONDANSETRON 2 MG/ML
4 INJECTION INTRAMUSCULAR; INTRAVENOUS
Status: COMPLETED | OUTPATIENT
Start: 2020-01-10 | End: 2020-01-10

## 2020-01-10 RX ADMIN — SODIUM CHLORIDE 1000 ML: 0.9 INJECTION, SOLUTION INTRAVENOUS at 02:01

## 2020-01-10 RX ADMIN — MORPHINE SULFATE 4 MG: 4 INJECTION INTRAVENOUS at 03:01

## 2020-01-10 RX ADMIN — ONDANSETRON 4 MG: 2 INJECTION INTRAMUSCULAR; INTRAVENOUS at 12:01

## 2020-01-10 RX ADMIN — KETOROLAC TROMETHAMINE 15 MG: 30 INJECTION, SOLUTION INTRAMUSCULAR at 12:01

## 2020-01-10 RX ADMIN — CEFTRIAXONE 1 G: 1 INJECTION, SOLUTION INTRAVENOUS at 02:01

## 2020-01-10 RX ADMIN — KETOROLAC TROMETHAMINE 15 MG: 30 INJECTION, SOLUTION INTRAMUSCULAR at 02:01

## 2020-01-12 LAB
BACTERIA UR CULT: NORMAL
BACTERIA UR CULT: NORMAL

## 2020-01-13 NOTE — ED PROVIDER NOTES
Encounter Date: 1/10/2020       History     Chief Complaint   Patient presents with    Flank Pain     Pt states started with sudden onset of throbbing pain to right lower back that is radiating to right flank and right lower abdomen.  Pt states pain started approximately 1 hour PTA.        Flank Pain   This is a recurrent problem. The current episode started 1 to 2 hours ago. The problem occurs constantly. The problem has been gradually worsening. Associated symptoms include abdominal pain. Nothing relieves the symptoms.     Review of patient's allergies indicates:   Allergen Reactions    Peaches [peach (prunus persica)] Hives, Itching and Swelling    Tramadol Other (See Comments)     headache     Past Medical History:   Diagnosis Date    Anxiety     Cystitis     Depression     Nephrolithiasis 11/20/2018    Neuropathy of both feet     Neuropathy of hand     Personal history of kidney stones 4/2012    has had several kidney stones and has bladder spasms    Urinary tract infection     Vaginal infection      Past Surgical History:   Procedure Laterality Date    BLADDER FULGURATION N/A 5/31/2018    Procedure: FULGURATION-BLADDER;  Surgeon: Angel Hernandez MD;  Location: [facility] OR;  Service: Urology;  Laterality: N/A;    BLADDER SUSPENSION  2013    bladder lifts    CYSTOSCOPY W/ URETERAL STENT PLACEMENT Left 3/2015    CYSTOSCOPY W/ URETERAL STENT REMOVAL Left 5/31/2018    Procedure: CYSTOSCOPY WITH STENT REMOVAL;  Surgeon: Angel Hernandez MD;  Location: [facility] OR;  Service: Urology;  Laterality: Left;    EXTRACORPOREAL SHOCK WAVE LITHOTRIPSY Right 9/6/2018    Procedure: LITHOTRIPSY, ESWL - Right distal ureteral calculus;  Surgeon: Angel Hernandez MD;  Location: [facility] OR;  Service: Urology;  Laterality: Right;    HYSTERECTOMY      Partial - still has ovaries    KNEE SURGERY Left     lithotripsy  4/2012    OVARIAN CYST SURGERY  2006    RETROGRADE PYELOGRAPHY Left 5/31/2018    Procedure: PYELOGRAM,  RETROGRADE;  Surgeon: Angel Hernandez MD;  [facility];  Service: Urology;  Laterality: Left;     Family History   Problem Relation Age of Onset    Hypertension Father     Hypertension Mother     Seizures Mother     Fibromyalgia Mother     No Known Problems Sister     Seizures Brother     Seizures Brother     No Known Problems Sister     No Known Problems Sister     Cancer Neg Hx     Heart disease Neg Hx     Kidney disease Neg Hx      Social History     Tobacco Use    Smoking status: Never Smoker    Smokeless tobacco: Never Used   Substance Use Topics    Alcohol use: No     Frequency: Monthly or less     Drinks per session: 1 or 2     Binge frequency: Never    Drug use: No     Review of Systems   Constitutional: Negative for fever.   Gastrointestinal: Positive for abdominal pain and nausea. Negative for vomiting.   Genitourinary: Positive for flank pain. Negative for vaginal pain.   Musculoskeletal: Negative for back pain.   All other systems reviewed and are negative.      Physical Exam     Initial Vitals [01/10/20 1203]   BP Pulse Resp Temp SpO2   (!) 141/97 (!) 115 20 97.7 °F (36.5 °C) 98 %      MAP       --         Physical Exam    Nursing note and vitals reviewed.  Constitutional: She appears well-developed and well-nourished. She is not diaphoretic. No distress.   HENT:   Head: Normocephalic and atraumatic.   Eyes: Conjunctivae and EOM are normal.   Neck: Normal range of motion. Neck supple.   Cardiovascular: Normal rate, regular rhythm and normal heart sounds.   Pulmonary/Chest: Breath sounds normal. No respiratory distress.   Abdominal: Soft. There is no tenderness. There is no rebound, no guarding and no CVA tenderness.       Musculoskeletal: Normal range of motion. She exhibits no edema or tenderness.   Neurological: She is alert and oriented to person, place, and time. She has normal strength.   Skin: Skin is warm and dry. Capillary refill takes less than 2 seconds.         ED  Course   Procedures  Labs Reviewed   URINALYSIS, REFLEX TO URINE CULTURE - Abnormal; Notable for the following components:       Result Value    Appearance, UA Hazy (*)     Protein, UA Trace (*)     Occult Blood UA 1+ (*)     Nitrite, UA Positive (*)     Leukocytes, UA 3+ (*)     All other components within normal limits    Narrative:     Preferred Collection Type->Urine, Clean Catch   URINALYSIS MICROSCOPIC - Abnormal; Notable for the following components:    WBC,  (*)     Bacteria Many (*)     All other components within normal limits    Narrative:     Preferred Collection Type->Urine, Clean Catch   CBC W/ AUTO DIFFERENTIAL - Abnormal; Notable for the following components:    Immature Granulocytes 0.9 (*)     Gran # (ANC) 9.2 (*)     Immature Grans (Abs) 0.11 (*)     Gran% 76.7 (*)     Lymph% 12.5 (*)     All other components within normal limits   COMPREHENSIVE METABOLIC PANEL - Abnormal; Notable for the following components:    Anion Gap 7 (*)     All other components within normal limits   LIPASE - Abnormal; Notable for the following components:    Lipase Result 331 (*)     All other components within normal limits   CULTURE, URINE    Narrative:     Preferred Collection Type->Urine, Clean Catch   LACTIC ACID, PLASMA          Imaging Results          US Abdomen Limited (Final result)  Result time 01/10/20 13:48:27    Final result by JAN Ybarra Sr., MD (01/10/20 13:48:27)                 Impression:      1. There is a 4 mm stone in the distal aspect of the right ureter. This stone is located 4 cm superior to the urinary bladder. Proximal to this stone there is a mild amount of right hydroureter and hydronephrosis. There is flow visualized from the right ureter into the urinary bladder.  2. There is a 4 mm stone in the collecting system in the inferior pole of the right kidney.  There is an 8 mm stone in the collecting system in the midpole of the right kidney.  3. There is a 12 mm cyst in the body of  the pancreas.      Electronically signed by: Magdy Ybarra MD  Date:    01/10/2020  Time:    13:48             Narrative:    EXAMINATION:  US ABDOMEN LIMITED    CLINICAL HISTORY:  ruq/flank pain;    TECHNIQUE:  Multiple static ultrasound images are submitted for interpretation.    COMPARISON:  An ultrasound examination of the kidneys performed on 01/18/2016.    FINDINGS:  The liver is normal in appearance. It measures 14.5 cm in length. There is no intrahepatic biliary ductal dilatation. The common bile duct has a diameter of 2 mm. The gallbladder is normal in appearance.  There is a 12 mm cyst in the body of the pancreas.  Otherwise, the visualized portion of the pancreas is normal in appearance.  The right kidney measures 12.2 cm in length.  There is a 4 mm stone in the collecting system in the inferior pole of the right kidney.  There is an 8 mm stone in the collecting system in the midpole of the right kidney.  There is a 4 mm stone in the distal aspect of the right ureter.  This stone is located 4 cm superior to the urinary bladder.  Proximal to this stone there is a mild amount of right hydroureter and hydronephrosis.  There is flow visualized from the right ureter into the urinary bladder.  The inferior vena cava is normal in appearance.                                 Medical Decision Making:   History:   Old Medical Records: I decided to obtain old medical records.  Old Records Summarized: records from previous admission(s).       <> Summary of Records: Hx recurrent stone dz and UTI  Differential Diagnosis:   Differential Diagnosis includes, but is not limited to:  STI, HSV, BV, PID, TOA, vaginal foreign body, vaginal trauma, ovarian torsion, ovarian cyst, UTI/pyelonephritis, pregnancy complication, dysmenorrhea, mittelschmertz, appendicitis, nephrolithiasis, appendicitis, colitis, diverticulitis,    Clinical Tests:   Lab Tests: Ordered and Reviewed  Radiological Study: Ordered and Reviewed  ED  Management:  Ultrasound pain given patient's prior history with visualized nonobstructing stone less than 5 mm.  UA is remarkable for nitrites and numerous WBCs.  Patient is afebrile tolerating p.o. blood counts lactate reassuring.  Will discharge with antibiotics and pain control with close urology follow-up.  We discussed admission versus discharge with close follow-up, and at length discussion regarding indications for ED return including fever, severe pain, chills, any other new or concerning symptoms.                                 Clinical Impression:       ICD-10-CM ICD-9-CM   1. Ureterolithiasis N20.1 592.1   2. Urinary tract infection with hematuria, site unspecified N39.0 599.0    R31.9 599.70         Disposition:   Disposition: Discharged  Condition: Stable                     Guy J. Lefort, MD  01/13/20 1230

## 2020-01-14 ENCOUNTER — PATIENT MESSAGE (OUTPATIENT)
Dept: UROLOGY | Facility: CLINIC | Age: 40
End: 2020-01-14

## 2020-01-14 DIAGNOSIS — N20.0 KIDNEY STONE: Primary | ICD-10-CM

## 2020-01-14 RX ORDER — SODIUM CHLORIDE 9 MG/ML
INJECTION, SOLUTION INTRAVENOUS CONTINUOUS
Status: CANCELLED | OUTPATIENT
Start: 2020-01-14

## 2020-01-16 PROBLEM — N20.0 KIDNEY STONE: Status: ACTIVE | Noted: 2020-01-16

## 2020-01-16 PROBLEM — N23 RENAL COLIC ON RIGHT SIDE: Status: ACTIVE | Noted: 2020-01-16

## 2020-02-04 ENCOUNTER — PATIENT MESSAGE (OUTPATIENT)
Dept: UROLOGY | Facility: CLINIC | Age: 40
End: 2020-02-04

## 2020-02-04 DIAGNOSIS — N20.0 KIDNEY STONE: Primary | ICD-10-CM

## 2020-02-05 ENCOUNTER — PATIENT OUTREACH (OUTPATIENT)
Dept: ADMINISTRATIVE | Facility: OTHER | Age: 40
End: 2020-02-05

## 2020-02-07 ENCOUNTER — OFFICE VISIT (OUTPATIENT)
Dept: UROLOGY | Facility: CLINIC | Age: 40
End: 2020-02-07
Payer: COMMERCIAL

## 2020-02-07 VITALS
DIASTOLIC BLOOD PRESSURE: 62 MMHG | HEART RATE: 77 BPM | OXYGEN SATURATION: 98 % | RESPIRATION RATE: 18 BRPM | TEMPERATURE: 98 F | BODY MASS INDEX: 36.7 KG/M2 | SYSTOLIC BLOOD PRESSURE: 136 MMHG | WEIGHT: 215 LBS | HEIGHT: 64 IN

## 2020-02-07 DIAGNOSIS — Z53.20 PROCEDURE NOT CARRIED OUT BECAUSE OF PATIENT'S DECISION: Primary | ICD-10-CM

## 2020-02-07 PROCEDURE — 99499 NO LOS: ICD-10-PCS | Mod: S$GLB,,, | Performed by: UROLOGY

## 2020-02-07 PROCEDURE — 99999 PR PBB SHADOW E&M-EST. PATIENT-LVL IV: ICD-10-PCS | Mod: PBBFAC,,, | Performed by: UROLOGY

## 2020-02-07 PROCEDURE — 99499 UNLISTED E&M SERVICE: CPT | Mod: S$GLB,,, | Performed by: UROLOGY

## 2020-02-07 PROCEDURE — 99999 PR PBB SHADOW E&M-EST. PATIENT-LVL IV: CPT | Mod: PBBFAC,,, | Performed by: UROLOGY

## 2020-02-07 RX ORDER — HYDROCODONE BITARTRATE AND ACETAMINOPHEN 7.5; 325 MG/1; MG/1
TABLET ORAL
COMMUNITY
Start: 2019-11-14 | End: 2020-10-30

## 2020-04-01 ENCOUNTER — PATIENT MESSAGE (OUTPATIENT)
Dept: UROLOGY | Facility: CLINIC | Age: 40
End: 2020-04-01

## 2020-04-01 RX ORDER — HYDROCODONE BITARTRATE AND ACETAMINOPHEN 5; 325 MG/1; MG/1
1 TABLET ORAL EVERY 6 HOURS PRN
Qty: 28 TABLET | Refills: 0 | Status: SHIPPED | OUTPATIENT
Start: 2020-04-01 | End: 2020-04-08

## 2020-04-01 RX ORDER — KETOROLAC TROMETHAMINE 10 MG/1
10 TABLET, FILM COATED ORAL EVERY 6 HOURS
Qty: 20 TABLET | Refills: 1 | Status: SHIPPED | OUTPATIENT
Start: 2020-04-01 | End: 2020-04-06

## 2020-04-01 RX ORDER — TAMSULOSIN HYDROCHLORIDE 0.4 MG/1
0.4 CAPSULE ORAL NIGHTLY
Qty: 30 CAPSULE | Refills: 1 | Status: SHIPPED | OUTPATIENT
Start: 2020-04-01 | End: 2020-04-25

## 2020-04-30 ENCOUNTER — TELEPHONE (OUTPATIENT)
Dept: NEUROLOGY | Facility: CLINIC | Age: 40
End: 2020-04-30

## 2020-04-30 NOTE — TELEPHONE ENCOUNTER
lvm informing patient that her EMG has been rescheduled to 5/18/20 at 8am. Appointment letter sent in the mail.

## 2020-05-18 ENCOUNTER — PROCEDURE VISIT (OUTPATIENT)
Dept: NEUROLOGY | Facility: CLINIC | Age: 40
End: 2020-05-18
Payer: COMMERCIAL

## 2020-05-18 DIAGNOSIS — G62.9 POLYNEUROPATHY: ICD-10-CM

## 2020-05-18 DIAGNOSIS — R29.898 HAND WEAKNESS: ICD-10-CM

## 2020-05-18 PROCEDURE — 95886 PR EMG COMPLETE, W/ NERVE CONDUCTION STUDIES, 5+ MUSCLES: ICD-10-PCS | Mod: S$GLB,,, | Performed by: PSYCHIATRY & NEUROLOGY

## 2020-05-18 PROCEDURE — 95886 MUSC TEST DONE W/N TEST COMP: CPT | Mod: S$GLB,,, | Performed by: PSYCHIATRY & NEUROLOGY

## 2020-05-18 PROCEDURE — 95912 NRV CNDJ TEST 11-12 STUDIES: CPT | Mod: S$GLB,,, | Performed by: PSYCHIATRY & NEUROLOGY

## 2020-05-18 PROCEDURE — 95912 PR NERVE CONDUCTION STUDY; 11 -12 STUDIES: ICD-10-PCS | Mod: S$GLB,,, | Performed by: PSYCHIATRY & NEUROLOGY

## 2020-10-30 PROBLEM — Z90.710 H/O: HYSTERECTOMY: Status: RESOLVED | Noted: 2019-05-29 | Resolved: 2020-10-30

## 2020-11-10 ENCOUNTER — PATIENT MESSAGE (OUTPATIENT)
Dept: UROLOGY | Facility: CLINIC | Age: 40
End: 2020-11-10

## 2020-11-23 ENCOUNTER — OFFICE VISIT (OUTPATIENT)
Dept: FAMILY MEDICINE | Facility: CLINIC | Age: 40
End: 2020-11-23
Payer: COMMERCIAL

## 2020-11-23 VITALS
TEMPERATURE: 98 F | RESPIRATION RATE: 16 BRPM | SYSTOLIC BLOOD PRESSURE: 124 MMHG | WEIGHT: 218.56 LBS | DIASTOLIC BLOOD PRESSURE: 88 MMHG | OXYGEN SATURATION: 97 % | HEIGHT: 64 IN | HEART RATE: 99 BPM | BODY MASS INDEX: 37.31 KG/M2

## 2020-11-23 DIAGNOSIS — F99 INSOMNIA DUE TO OTHER MENTAL DISORDER: ICD-10-CM

## 2020-11-23 DIAGNOSIS — E66.09 CLASS 2 OBESITY DUE TO EXCESS CALORIES WITHOUT SERIOUS COMORBIDITY WITH BODY MASS INDEX (BMI) OF 37.0 TO 37.9 IN ADULT: ICD-10-CM

## 2020-11-23 DIAGNOSIS — F41.9 ANXIETY AND DEPRESSION: Primary | ICD-10-CM

## 2020-11-23 DIAGNOSIS — Z11.59 SCREENING FOR VIRAL DISEASE: ICD-10-CM

## 2020-11-23 DIAGNOSIS — G62.9 NEUROPATHY: ICD-10-CM

## 2020-11-23 DIAGNOSIS — F32.A ANXIETY AND DEPRESSION: Primary | ICD-10-CM

## 2020-11-23 DIAGNOSIS — Z12.31 ENCOUNTER FOR SCREENING MAMMOGRAM FOR MALIGNANT NEOPLASM OF BREAST: ICD-10-CM

## 2020-11-23 DIAGNOSIS — F51.05 INSOMNIA DUE TO OTHER MENTAL DISORDER: ICD-10-CM

## 2020-11-23 DIAGNOSIS — Z00.00 ANNUAL PHYSICAL EXAM: ICD-10-CM

## 2020-11-23 PROCEDURE — 90471 IMMUNIZATION ADMIN: CPT | Mod: S$GLB,,, | Performed by: INTERNAL MEDICINE

## 2020-11-23 PROCEDURE — 99214 OFFICE O/P EST MOD 30 MIN: CPT | Mod: 25,S$GLB,, | Performed by: INTERNAL MEDICINE

## 2020-11-23 PROCEDURE — 99999 PR PBB SHADOW E&M-EST. PATIENT-LVL III: ICD-10-PCS | Mod: PBBFAC,,, | Performed by: INTERNAL MEDICINE

## 2020-11-23 PROCEDURE — 99999 PR PBB SHADOW E&M-EST. PATIENT-LVL III: CPT | Mod: PBBFAC,,, | Performed by: INTERNAL MEDICINE

## 2020-11-23 PROCEDURE — 3008F BODY MASS INDEX DOCD: CPT | Mod: CPTII,S$GLB,, | Performed by: INTERNAL MEDICINE

## 2020-11-23 PROCEDURE — 3008F PR BODY MASS INDEX (BMI) DOCUMENTED: ICD-10-PCS | Mod: CPTII,S$GLB,, | Performed by: INTERNAL MEDICINE

## 2020-11-23 PROCEDURE — 90686 FLU VACCINE (QUAD) GREATER THAN OR EQUAL TO 3YO PRESERVATIVE FREE IM: ICD-10-PCS | Mod: S$GLB,,, | Performed by: INTERNAL MEDICINE

## 2020-11-23 PROCEDURE — 90686 IIV4 VACC NO PRSV 0.5 ML IM: CPT | Mod: S$GLB,,, | Performed by: INTERNAL MEDICINE

## 2020-11-23 PROCEDURE — 99214 PR OFFICE/OUTPT VISIT, EST, LEVL IV, 30-39 MIN: ICD-10-PCS | Mod: 25,S$GLB,, | Performed by: INTERNAL MEDICINE

## 2020-11-23 PROCEDURE — 90471 FLU VACCINE (QUAD) GREATER THAN OR EQUAL TO 3YO PRESERVATIVE FREE IM: ICD-10-PCS | Mod: S$GLB,,, | Performed by: INTERNAL MEDICINE

## 2020-11-23 RX ORDER — DULOXETIN HYDROCHLORIDE 30 MG/1
30 CAPSULE, DELAYED RELEASE ORAL DAILY
Qty: 30 CAPSULE | Refills: 11 | Status: SHIPPED | OUTPATIENT
Start: 2020-11-23 | End: 2021-05-12 | Stop reason: CLARIF

## 2020-11-23 NOTE — PROGRESS NOTES
[facility] Note    Chief Complaint      Chief Complaint   Patient presents with    Anxiety    Insomnia     History of Present Illness      Lesa Leyva is a 40 y.o. female who presents today for anxiety/insomnia.  Patient comes to appointment alone.   Neuro: Wilmer, Urology: David    Has been having issues with anxiety lately, not sleeping for last 3 weeks.  Had panic attack last week, this was her first one.  Lasted 30 minutes.  Has felt on edge since.  Has never had issues with sleeping in past.  Has been on Cymbalta for 7 years, same dose for last 3 years.    Feels like she can't turn her brain off at night.  Can't fall asleep and can't stay asleep.  Sleeps 3-4 hours at night.  Has taken melatonin in past with no relief.  Has not tried unasom. Has not been exercising 2/2 having 2 knee surgeries in the last year.  Still having some issues, [facility], also has appt with Dr. Carney.  Has lost 10 pounds over the last month.    Problem List Items Addressed This Visit     Neuropathy    Current Assessment & Plan     Sees aurelio Duran on Lyrica.  Works well for her.         Anxiety and depression - Primary    Current Assessment & Plan     On cymbalta 60 mg daily.         Relevant Medications    DULoxetine (CYMBALTA) 30 MG capsule      Other Visit Diagnoses     Insomnia due to other mental disorder        Class 2 obesity due to excess calories without serious comorbidity with body mass index (BMI) of 37.0 to 37.9 in adult        Annual physical exam        Relevant Orders    CBC Auto Differential    Lipid Panel    Comprehensive Metabolic Panel    Encounter for screening mammogram for malignant neoplasm of breast        Screening for viral disease        Relevant Orders    Hepatitis C Antibody          Health Maintenance   Topic Date Due    Hepatitis C Screening  1982    TETANUS VACCINE  01/25/2029    Lipid Panel  Completed       Past  Medical History:   Diagnosis Date    Anxiety     Cystitis     Depression     H/O: hysterectomy 5/29/2019    Nephrolithiasis 11/20/2018    Neuropathy of both feet     Neuropathy of hand     Personal history of kidney stones 4/2012    has had several kidney stones and has bladder spasms    Urinary tract infection     Vaginal infection        Past Surgical History:   Procedure Laterality Date    BLADDER FULGURATION N/A 5/31/2018    Procedure: FULGURATION-BLADDER;  Surgeon: Angel Hernandez MD;  Location: [facility];  Service: Urology;  Laterality: N/A;    BLADDER SUSPENSION  2013    bladder lifts    CYSTOSCOPY W/ URETERAL STENT PLACEMENT Left 3/2015    CYSTOSCOPY W/ URETERAL STENT REMOVAL Left 5/31/2018    Procedure: CYSTOSCOPY WITH STENT REMOVAL;  Surgeon: Angel Hernandez MD;  Location: [facility] OR;  Service: Urology;  Laterality: Left;    EXTRACORPOREAL SHOCK WAVE LITHOTRIPSY Right 9/6/2018    Procedure: LITHOTRIPSY, ESWL - Right distal ureteral calculus;  Surgeon: Angel Hernandez MD;  Location: [facility];  Service: Urology;  Laterality: Right;    EXTRACORPOREAL SHOCK WAVE LITHOTRIPSY Right 1/16/2020    Procedure: LITHOTRIPSY, ESWL;  Surgeon: Angel Hernandez MD;  Location: [facility] OR;  Service: Urology;  Laterality: Right;    HYSTERECTOMY      Partial - still has ovaries    KNEE SURGERY Left     lithotripsy  4/2012    OVARIAN CYST SURGERY  2006    RETROGRADE PYELOGRAPHY Left 5/31/2018    Procedure: PYELOGRAM, RETROGRADE;  Surgeon: Angel Hernandez MD;  Location: [facility];  Service: Urology;  Laterality: Left;       family history includes Fibromyalgia in her mother; Hypertension in her father and mother; No Known Problems in her sister, sister, and sister; Seizures in her brother, brother, and mother.    Social History     Tobacco Use    Smoking status: Never Smoker    Smokeless tobacco: Never Used   Substance Use Topics    Alcohol use: Yes     Frequency: Monthly or less     Drinks per session: 3 or 4  "    Binge frequency: Less than monthly     Comment: rarely    Drug use: No       Review of Systems   Constitutional: Negative for chills and fever.   HENT: Negative for congestion, hearing loss and sore throat.    Eyes: Negative for blurred vision and discharge.   Respiratory: Negative for cough, shortness of breath and wheezing.    Cardiovascular: Negative for chest pain and palpitations.   Gastrointestinal: Negative for blood in stool, constipation, diarrhea, nausea and vomiting.   Genitourinary: Negative for dysuria and hematuria.   Musculoskeletal: Negative for falls, myalgias and neck pain.   Skin: Negative for itching and rash.   Neurological: Negative for dizziness, weakness and headaches.   Endo/Heme/Allergies: Negative for polydipsia.        Outpatient Encounter Medications as of 11/23/2020   Medication Sig Dispense Refill    duloxetine (CYMBALTA) 60 MG capsule Take 60 mg by mouth once daily.  6    ibuprofen (ADVIL,MOTRIN) 600 MG tablet Take 1 tablet (600 mg total) by mouth every 6 (six) hours as needed for Pain. 20 tablet 0    pregabalin (LYRICA) 100 MG capsule Take 1 capsule (100 mg total) by mouth 2 (two) times daily. 180 capsule 1    DULoxetine (CYMBALTA) 30 MG capsule Take 1 capsule (30 mg total) by mouth once daily. 30 capsule 11     No facility-administered encounter medications on file as of 11/23/2020.         Review of patient's allergies indicates:   Allergen Reactions    Peaches [peach (prunus persica)] Hives, Itching and Swelling    Tramadol Other (See Comments)     headache       Physical Exam      Vital Signs  Temp: 97.7 °F (36.5 °C)  Temp src: Temporal  Pulse: 99  Resp: 16  SpO2: 97 %  BP: 124/88  BP Location: Left arm  Patient Position: Sitting  Height and Weight  Height: 5' 4" (162.6 cm)  Weight: 99.2 kg (218 lb 9.4 oz)  BSA (Calculated - sq m): 2.12 sq meters  BMI (Calculated): 37.5  Weight in (lb) to have BMI = 25: 145.3]    Physical Exam  Constitutional:       Appearance: She is " well-developed.   HENT:      Head: Normocephalic and atraumatic.      Right Ear: External ear normal.      Left Ear: External ear normal.   Eyes:      General:         Right eye: No discharge.         Left eye: No discharge.   Neck:      Musculoskeletal: Normal range of motion.      Thyroid: No thyromegaly.   Cardiovascular:      Rate and Rhythm: Normal rate and regular rhythm.      Heart sounds: Normal heart sounds. No murmur.   Pulmonary:      Effort: Pulmonary effort is normal. No respiratory distress.      Breath sounds: Normal breath sounds.   Abdominal:      General: Bowel sounds are normal. There is no distension.      Palpations: Abdomen is soft.      Tenderness: There is no abdominal tenderness.   Musculoskeletal: Normal range of motion.         General: No deformity.   Skin:     General: Skin is warm and dry.      Findings: No rash.   Neurological:      Mental Status: She is alert and oriented to person, place, and time.   Psychiatric:         Behavior: Behavior normal.          Laboratory:  CBC:  No results for input(s): WBC, RBC, HGB, HCT, PLT, MCV, MCH, MCHC in the last 2160 hours.  CMP:  No results for input(s): GLU, CALCIUM, ALBUMIN, PROT, NA, K, CO2, CL, BUN, ALKPHOS, ALT, AST, BILITOT in the last 2160 hours.    Invalid input(s): CREATININ  URINALYSIS:  No results for input(s): COLORU, CLARITYU, SPECGRAV, PHUR, PROTEINUA, GLUCOSEU, BILIRUBINCON, BLOODU, WBCU, RBCU, BACTERIA, MUCUS, NITRITE, LEUKOCYTESUR, UROBILINOGEN, HYALINECASTS in the last 2160 hours.   LIPIDS:  No results for input(s): TSH, HDL, CHOL, TRIG, LDLCALC, CHOLHDL, NONHDLCHOL, TOTALCHOLEST in the last 2160 hours.  TSH:  No results for input(s): TSH in the last 2160 hours.  A1C:  No results for input(s): HGBA1C in the last 2160 hours.    Radiology:  No new imaging on file    Assessment/Plan     Lesa Leyva is a 40 y.o.female with:    1. Anxiety and depression  - DULoxetine (CYMBALTA) 30 MG capsule; Take 1 capsule (30 mg total) by  mouth once daily.  Dispense: 30 capsule; Refill: 11    2. Insomnia due to other mental disorder    3. Neuropathy    4. Class 2 obesity due to excess calories without serious comorbidity with body mass index (BMI) of 37.0 to 37.9 in adult    5. Annual physical exam  - CBC Auto Differential; Future  - Lipid Panel; Future  - Comprehensive Metabolic Panel; Future    6. Encounter for screening mammogram for malignant neoplasm of breast    7. Screening for viral disease  - Hepatitis C Antibody; Future      -increase Cymbalta to 90 mg daily, will try Unasom PRN for sleep  -Counseling on increasing exercise  -flu shot today, labs ordered  -Continue current medications and maintain follow up with specialists.  Return to clinic in 3-4 weeks via virtual visit      Carmen Ugalde MD  [facility]      Answers for HPI/ROS submitted by the patient on 11/21/2020   activity change: No  unexpected weight change: No  rhinorrhea: No  trouble swallowing: No  visual disturbance: No  chest tightness: Yes  polyuria: No  difficulty urinating: No  menstrual problem: No  joint swelling: No  arthralgias: No  confusion: No  dysphoric mood: Yes

## 2020-12-10 ENCOUNTER — PATIENT MESSAGE (OUTPATIENT)
Dept: ORTHOPEDICS | Facility: CLINIC | Age: 40
End: 2020-12-10

## 2020-12-18 ENCOUNTER — OFFICE VISIT (OUTPATIENT)
Dept: ORTHOPEDICS | Facility: CLINIC | Age: 40
End: 2020-12-18
Payer: COMMERCIAL

## 2020-12-18 VITALS
BODY MASS INDEX: 37.34 KG/M2 | HEART RATE: 115 BPM | HEIGHT: 64 IN | DIASTOLIC BLOOD PRESSURE: 80 MMHG | SYSTOLIC BLOOD PRESSURE: 120 MMHG | WEIGHT: 218.69 LBS

## 2020-12-18 DIAGNOSIS — M93.262 OSTEOCHONDRITIS DISSECANS OF LEFT KNEE: Primary | ICD-10-CM

## 2020-12-18 PROCEDURE — 99202 OFFICE O/P NEW SF 15 MIN: CPT | Mod: S$GLB,,, | Performed by: ORTHOPAEDIC SURGERY

## 2020-12-18 PROCEDURE — 99202 PR OFFICE/OUTPT VISIT, NEW, LEVL II, 15-29 MIN: ICD-10-PCS | Mod: S$GLB,,, | Performed by: ORTHOPAEDIC SURGERY

## 2020-12-18 PROCEDURE — 3008F BODY MASS INDEX DOCD: CPT | Mod: CPTII,S$GLB,, | Performed by: ORTHOPAEDIC SURGERY

## 2020-12-18 PROCEDURE — 1125F PR PAIN SEVERITY QUANTIFIED, PAIN PRESENT: ICD-10-PCS | Mod: S$GLB,,, | Performed by: ORTHOPAEDIC SURGERY

## 2020-12-18 PROCEDURE — 3008F PR BODY MASS INDEX (BMI) DOCUMENTED: ICD-10-PCS | Mod: CPTII,S$GLB,, | Performed by: ORTHOPAEDIC SURGERY

## 2020-12-18 PROCEDURE — 99999 PR PBB SHADOW E&M-EST. PATIENT-LVL III: ICD-10-PCS | Mod: PBBFAC,,, | Performed by: ORTHOPAEDIC SURGERY

## 2020-12-18 PROCEDURE — 1125F AMNT PAIN NOTED PAIN PRSNT: CPT | Mod: S$GLB,,, | Performed by: ORTHOPAEDIC SURGERY

## 2020-12-18 PROCEDURE — 99999 PR PBB SHADOW E&M-EST. PATIENT-LVL III: CPT | Mod: PBBFAC,,, | Performed by: ORTHOPAEDIC SURGERY

## 2020-12-18 NOTE — PROGRESS NOTES
Subjective:      Patient ID: Lesa Leyva is a 40 y.o. female.    Chief Complaint: Pain of the Left Knee and Consult    HPI     They have experienced problems with their left knee over the past 6 months. The patient denies relevant history of injury/aggravation.  She states that she had 2 knee arthroscopies by Dr. Kim in the last 3-4 months for meniscal repair and chondroplasty.  She reports that she has never gotten better.  Pain is located medially Associated symptoms include gelling.  Symptoms are aggravated by all walking. They have been treated with NSAIDs and self-directed stretching most recently.   Symptoms have recently stayed the same. Ambulation reportedly has been impaired. Self care ADLs are not painful.   She work as an .        Review of Systems   Constitution: Negative for fever and weight loss.   HENT: Negative for congestion.    Eyes: Negative for visual disturbance.   Cardiovascular: Negative for chest pain.   Respiratory: Negative for shortness of breath.    Hematologic/Lymphatic: Negative for bleeding problem. Does not bruise/bleed easily.   Skin: Negative for poor wound healing.   Musculoskeletal: Positive for joint pain.   Gastrointestinal: Negative for abdominal pain.   Genitourinary: Negative for dysuria.   Neurological: Negative for seizures.   Psychiatric/Behavioral: Negative for altered mental status.   Allergic/Immunologic: Negative for persistent infections.          Objective:      Ortho/SPM Exam      Left knee    [unfilled]    The patient is not in acute distress.   Sclerae normal  Body habitus is normal.  Respiratory distress:  none   The patient walks with a limp.  Hip irritability  negative.   The skin over the knee is has a healed scar.  Knee effusion 1+  Tendernes is located medially  Range of motion- Flexion 120 deg, Extension 5 deg,   Ligament laxity exam:   MCL 1+   Lachman 0   Post sag  0    LCL 0  Patellar apprehension negative.  Popliteal  cyst negative  Patellar crepitation present.  Meniscal irritability not testable  Pulses DP present, PT present.  Motor normal 5/5 strength in all tested muscle groups.   Sensory normal.    The patient did not bring any of her previous imaging for me to review    Assessment:       Encounter Diagnosis   Name Primary?    Osteochondritis dissecans of left knee Yes          Based on history and examination it appears that degeneration of the medial compartment likely due to osteochondritis is the principal problem.  I doubt the meniscal pathology is an active cause of symptoms at this time.  Plan:       Lesa was seen today for consult and pain.    Diagnoses and all orders for this visit:    Osteochondritis dissecans of left knee      I explained my diagnostic impression and the reasoning behind it in detail, using layman's terms.  Models and/or pictures were used to help in the explanation.    I explained that there is some chance of gradual improvement over the next few months without any specific new intervention.    For now I recommend that the patient discontinue stretching and focus on low resistance cycling and quad strengthening which I demonstrated.    I advised her to bring her arthroscopic pictures for me to review at next visit    X-ray next visit

## 2020-12-21 ENCOUNTER — OFFICE VISIT (OUTPATIENT)
Dept: FAMILY MEDICINE | Facility: CLINIC | Age: 40
End: 2020-12-21
Payer: COMMERCIAL

## 2020-12-21 DIAGNOSIS — F41.9 ANXIETY AND DEPRESSION: Primary | ICD-10-CM

## 2020-12-21 DIAGNOSIS — F32.A ANXIETY AND DEPRESSION: Primary | ICD-10-CM

## 2020-12-21 DIAGNOSIS — F51.01 PRIMARY INSOMNIA: ICD-10-CM

## 2020-12-21 PROCEDURE — 99213 PR OFFICE/OUTPT VISIT, EST, LEVL III, 20-29 MIN: ICD-10-PCS | Mod: 95,,, | Performed by: INTERNAL MEDICINE

## 2020-12-21 PROCEDURE — 99213 OFFICE O/P EST LOW 20 MIN: CPT | Mod: 95,,, | Performed by: INTERNAL MEDICINE

## 2020-12-21 RX ORDER — TRAZODONE HYDROCHLORIDE 50 MG/1
50 TABLET ORAL NIGHTLY
Qty: 30 TABLET | Refills: 11 | Status: SHIPPED | OUTPATIENT
Start: 2020-12-21 | End: 2021-10-21

## 2020-12-21 NOTE — PROGRESS NOTES
The patient location is: home  The chief complaint leading to consultation is: anxiety    Visit type: audiovisual    Face to Face time with patient: 10 minutes  10 minutes of total time spent on the encounter, which includes face to face time and non-face to face time preparing to see the patient (eg, review of tests), Obtaining and/or reviewing separately obtained history, Documenting clinical information in the electronic or other health record, Independently interpreting results (not separately reported) and communicating results to the patient/family/caregiver, or Care coordination (not separately reported).     Each patient to whom he or she provides medical services by telemedicine is:  (1) informed of the relationship between the physician and patient and the respective role of any other health care provider with respect to management of the patient; and (2) notified that he or she may decline to receive medical services by telemedicine and may withdraw from such care at any time.    [facility] Note    Chief Complaint      Chief Complaint   Patient presents with    Anxiety     History of Present Illness      Lesa Leyva is a 40 y.o. female who presents today for anxiety/insomnia.  Patient comes to appointment alone.   Neuro: Wilmer, Urology: David    Feels like Cymbalta has been helping, no panic attacks since last visit.  Waking up 2 AM, not going back to sleep until 5 AM, willing to try something Rx for sleep.  Has been exercising often.    From previous visit:  Has been having issues with anxiety lately, not sleeping for last 3 weeks.  Had panic attack last week, this was her first one.  Lasted 30 minutes.  Has felt on edge since.  Has never had issues with sleeping in past.  Has been on Cymbalta for 7 years, same dose for last 3 years.    Feels like she can't turn her brain off at night.  Can't fall asleep and can't stay asleep.  Sleeps 3-4 hours at night.  Has taken  melatonin in past with no relief.  Has not tried unasom. Has not been exercising 2/2 having 2 knee surgeries in the last year.  Still having some issues, [facility], also has appt with Dr. Carney.  Has lost 10 pounds over the last month.    Problem List Items Addressed This Visit     Anxiety and depression - Primary      Other Visit Diagnoses     Primary insomnia        Relevant Medications    traZODone (DESYREL) 50 MG tablet          Health Maintenance   Topic Date Due    TETANUS VACCINE  01/25/2029    Hepatitis C Screening  Completed    Lipid Panel  Completed       Past Medical History:   Diagnosis Date    Anxiety     Cystitis     Depression     H/O: hysterectomy 5/29/2019    Nephrolithiasis 11/20/2018    Neuropathy of both feet     Neuropathy of hand     Personal history of kidney stones 4/2012    has had several kidney stones and has bladder spasms    Urinary tract infection     Vaginal infection        Past Surgical History:   Procedure Laterality Date    BLADDER FULGURATION N/A 5/31/2018    Procedure: FULGURATION-BLADDER;  Surgeon: Angel Hernandez MD;  Location: [facility] OR;  Service: Urology;  Laterality: N/A;    BLADDER SUSPENSION  2013    bladder lifts    CYSTOSCOPY W/ URETERAL STENT PLACEMENT Left 3/2015    CYSTOSCOPY W/ URETERAL STENT REMOVAL Left 5/31/2018    Procedure: CYSTOSCOPY WITH STENT REMOVAL;  Surgeon: Angel Hernandez MD;  Location: [facility] OR;  Service: Urology;  Laterality: Left;    EXTRACORPOREAL SHOCK WAVE LITHOTRIPSY Right 9/6/2018    Procedure: LITHOTRIPSY, ESWL - Right distal ureteral calculus;  Surgeon: Angel Hernandez MD;  Location: [facility] OR;  Service: Urology;  Laterality: Right;    EXTRACORPOREAL SHOCK WAVE LITHOTRIPSY Right 1/16/2020    Procedure: LITHOTRIPSY, ESWL;  Surgeon: Angel Hernandez MD;  Location: [facility] OR;  Service: Urology;  Laterality: Right;    HYSTERECTOMY      Partial - still has ovaries    KNEE SURGERY Left      lithotripsy  4/2012    OVARIAN CYST SURGERY  2006    RETROGRADE PYELOGRAPHY Left 5/31/2018    Procedure: PYELOGRAM, RETROGRADE;  Surgeon: Angel Hernandez MD;  Location: [facility];  Service: Urology;  Laterality: Left;       family history includes Fibromyalgia in her mother; Hypertension in her father and mother; No Known Problems in her sister, sister, and sister; Seizures in her brother, brother, and mother.    Social History     Tobacco Use    Smoking status: Never Smoker    Smokeless tobacco: Never Used   Substance Use Topics    Alcohol use: Yes     Frequency: Monthly or less     Drinks per session: 3 or 4     Binge frequency: Less than monthly     Comment: rarely    Drug use: No       Review of Systems   Constitutional: Negative for chills and fever.   HENT: Negative for congestion, hearing loss and sore throat.    Eyes: Negative for blurred vision and discharge.   Respiratory: Negative for cough, shortness of breath and wheezing.    Cardiovascular: Negative for chest pain and palpitations.   Gastrointestinal: Negative for blood in stool, constipation, diarrhea, nausea and vomiting.   Genitourinary: Negative for dysuria and hematuria.   Musculoskeletal: Negative for falls, myalgias and neck pain.   Skin: Negative for itching and rash.   Neurological: Negative for dizziness, weakness and headaches.   Endo/Heme/Allergies: Negative for polydipsia.        Outpatient Encounter Medications as of 12/21/2020   Medication Sig Dispense Refill    DULoxetine (CYMBALTA) 30 MG capsule Take 1 capsule (30 mg total) by mouth once daily. 30 capsule 11    duloxetine (CYMBALTA) 60 MG capsule Take 60 mg by mouth once daily.  6    ibuprofen (ADVIL,MOTRIN) 600 MG tablet Take 1 tablet (600 mg total) by mouth every 6 (six) hours as needed for Pain. 20 tablet 0    pregabalin (LYRICA) 100 MG capsule TAKE 1 CAPSULE BY MOUTH TWICE A  capsule 0    traZODone (DESYREL) 50 MG tablet Take 1 tablet (50 mg total) by mouth every  evening. 30 tablet 11     No facility-administered encounter medications on file as of 12/21/2020.         Review of patient's allergies indicates:   Allergen Reactions    Peaches [peach (prunus persica)] Hives, Itching and Swelling    Tramadol Other (See Comments)     headache       Physical Exam       ]No vital signs taken by me as this is a virtual visit.  Any reported are patient's home measurements.      Physical Exam  Constitutional:       General: She is not in acute distress.     Appearance: She is well-developed.   HENT:      Head: Normocephalic and atraumatic.   Neck:      Musculoskeletal: Normal range of motion.   Pulmonary:      Effort: Pulmonary effort is normal. No respiratory distress.   Skin:     Findings: No rash.   Neurological:      Mental Status: She is alert and oriented to person, place, and time.      Coordination: Coordination normal.   Psychiatric:         Behavior: Behavior normal.         Thought Content: Thought content normal.         Judgment: Judgment normal.          Laboratory:  CBC:  Recent Labs   Lab Result Units 11/27/20  0836   WBC K/uL 7.75   RBC M/uL 4.67   Hemoglobin g/dL 12.9   Hematocrit % 40.9   Platelets K/uL 295   MCV fL 88   MCH pg 27.6   MCHC g/dL 31.5*     CMP:  Recent Labs   Lab Result Units 11/27/20  0836   Glucose mg/dL 112*   Calcium mg/dL 9.9   Albumin g/dL 4.7   Total Protein g/dL 8.2   Sodium mmol/L 146*   Potassium mmol/L 4.2   CO2 mmol/L 29   Chloride mmol/L 108   BUN mg/dL 15   Alkaline Phosphatase U/L 124   ALT U/L 17   AST U/L 24   Total Bilirubin mg/dL 0.6     URINALYSIS:  No results for input(s): COLORU, CLARITYU, SPECGRAV, PHUR, PROTEINUA, GLUCOSEU, BILIRUBINCON, BLOODU, WBCU, RBCU, BACTERIA, MUCUS, NITRITE, LEUKOCYTESUR, UROBILINOGEN, HYALINECASTS in the last 2160 hours.   LIPIDS:  Recent Labs   Lab Result Units 11/27/20  0836   HDL mg/dL 49   Cholesterol mg/dL 256*   Triglycerides mg/dL 128   LDL Cholesterol mg/dL 181.4*   HDL/Cholesterol Ratio %  19.1*   Non-HDL Cholesterol mg/dL 207   Total Cholesterol/HDL Ratio  5.2*     TSH:  No results for input(s): TSH in the last 2160 hours.  A1C:  No results for input(s): HGBA1C in the last 2160 hours.    Radiology:  No new imaging on file    Assessment/Plan     Lesa Leyva is a 40 y.o.female with:    1. Anxiety and depression    2. Primary insomnia  - traZODone (DESYREL) 50 MG tablet; Take 1 tablet (50 mg total) by mouth every evening.  Dispense: 30 tablet; Refill: 11      -continue Cymbalta at current dose, will try Trazodone PRN for sleep  -Counseled on continuing exercise  -Continue current medications and maintain follow up with specialists.  Return to clinic PRN      Carmen Ugalde MD  [facility] [place] [facility]    Answers for HPI/ROS submitted by the patient on 12/21/2020   activity change: No  unexpected weight change: No  rhinorrhea: No  trouble swallowing: No  visual disturbance: No  chest tightness: No  polyuria: No  difficulty urinating: No  menstrual problem: No  joint swelling: No  arthralgias: No  confusion: No  dysphoric mood: Yes

## 2020-12-26 ENCOUNTER — PATIENT MESSAGE (OUTPATIENT)
Dept: ORTHOPEDICS | Facility: CLINIC | Age: 40
End: 2020-12-26

## 2020-12-28 ENCOUNTER — PATIENT MESSAGE (OUTPATIENT)
Dept: ORTHOPEDICS | Facility: CLINIC | Age: 40
End: 2020-12-28

## 2021-01-14 ENCOUNTER — OFFICE VISIT (OUTPATIENT)
Dept: ORTHOPEDICS | Facility: CLINIC | Age: 41
End: 2021-01-14
Payer: COMMERCIAL

## 2021-01-14 ENCOUNTER — HOSPITAL ENCOUNTER (OUTPATIENT)
Dept: RADIOLOGY | Facility: HOSPITAL | Age: 41
Discharge: HOME | End: 2021-01-14
Attending: ORTHOPAEDIC SURGERY
Payer: COMMERCIAL

## 2021-01-14 VITALS
SYSTOLIC BLOOD PRESSURE: 124 MMHG | WEIGHT: 219 LBS | HEIGHT: 64 IN | BODY MASS INDEX: 37.39 KG/M2 | HEART RATE: 105 BPM | DIASTOLIC BLOOD PRESSURE: 84 MMHG

## 2021-01-14 DIAGNOSIS — M25.562 ACUTE PAIN OF LEFT KNEE: Primary | ICD-10-CM

## 2021-01-14 DIAGNOSIS — M17.12 PRIMARY OSTEOARTHRITIS OF LEFT KNEE: ICD-10-CM

## 2021-01-14 DIAGNOSIS — M25.562 ACUTE PAIN OF LEFT KNEE: ICD-10-CM

## 2021-01-14 PROCEDURE — 1125F PR PAIN SEVERITY QUANTIFIED, PAIN PRESENT: ICD-10-PCS | Mod: S$GLB,,, | Performed by: ORTHOPAEDIC SURGERY

## 2021-01-14 PROCEDURE — 99214 OFFICE O/P EST MOD 30 MIN: CPT | Mod: S$GLB,,, | Performed by: ORTHOPAEDIC SURGERY

## 2021-01-14 PROCEDURE — 99999 PR PBB SHADOW E&M-EST. PATIENT-LVL III: CPT | Mod: PBBFAC,,, | Performed by: ORTHOPAEDIC SURGERY

## 2021-01-14 PROCEDURE — 1125F AMNT PAIN NOTED PAIN PRSNT: CPT | Mod: S$GLB,,, | Performed by: ORTHOPAEDIC SURGERY

## 2021-01-14 PROCEDURE — 3008F PR BODY MASS INDEX (BMI) DOCUMENTED: ICD-10-PCS | Mod: CPTII,S$GLB,, | Performed by: ORTHOPAEDIC SURGERY

## 2021-01-14 PROCEDURE — 73564 X-RAY EXAM KNEE 4 OR MORE: CPT | Mod: TC,FY,LT

## 2021-01-14 PROCEDURE — 73564 XR KNEE COMP 4 OR MORE VIEWS LEFT: ICD-10-PCS | Mod: 26,LT,, | Performed by: RADIOLOGY

## 2021-01-14 PROCEDURE — 3008F BODY MASS INDEX DOCD: CPT | Mod: CPTII,S$GLB,, | Performed by: ORTHOPAEDIC SURGERY

## 2021-01-14 PROCEDURE — 73564 X-RAY EXAM KNEE 4 OR MORE: CPT | Mod: 26,LT,, | Performed by: RADIOLOGY

## 2021-01-14 PROCEDURE — 99214 PR OFFICE/OUTPT VISIT, EST, LEVL IV, 30-39 MIN: ICD-10-PCS | Mod: S$GLB,,, | Performed by: ORTHOPAEDIC SURGERY

## 2021-01-14 PROCEDURE — 99999 PR PBB SHADOW E&M-EST. PATIENT-LVL III: ICD-10-PCS | Mod: PBBFAC,,, | Performed by: ORTHOPAEDIC SURGERY

## 2021-01-14 RX ORDER — DICLOFENAC SODIUM 75 MG/1
75 TABLET, DELAYED RELEASE ORAL 2 TIMES DAILY
Qty: 60 TABLET | Refills: 0 | Status: SHIPPED | OUTPATIENT
Start: 2021-01-14 | End: 2021-02-13

## 2021-02-17 ENCOUNTER — TELEPHONE (OUTPATIENT)
Dept: ORTHOPEDICS | Facility: CLINIC | Age: 41
End: 2021-02-17

## 2021-02-17 ENCOUNTER — OFFICE VISIT (OUTPATIENT)
Dept: ORTHOPEDICS | Facility: CLINIC | Age: 41
End: 2021-02-17
Payer: COMMERCIAL

## 2021-02-17 VITALS
BODY MASS INDEX: 37.24 KG/M2 | HEART RATE: 111 BPM | SYSTOLIC BLOOD PRESSURE: 142 MMHG | WEIGHT: 216.94 LBS | DIASTOLIC BLOOD PRESSURE: 88 MMHG

## 2021-02-17 DIAGNOSIS — M17.12 PRIMARY OSTEOARTHRITIS OF LEFT KNEE: Primary | ICD-10-CM

## 2021-02-17 PROCEDURE — 1125F AMNT PAIN NOTED PAIN PRSNT: CPT | Mod: S$GLB,,, | Performed by: PHYSICIAN ASSISTANT

## 2021-02-17 PROCEDURE — 99213 PR OFFICE/OUTPT VISIT, EST, LEVL III, 20-29 MIN: ICD-10-PCS | Mod: 25,S$GLB,, | Performed by: PHYSICIAN ASSISTANT

## 2021-02-17 PROCEDURE — 20610 LARGE JOINT ASPIRATION/INJECTION: L KNEE: ICD-10-PCS | Mod: LT,S$GLB,, | Performed by: PHYSICIAN ASSISTANT

## 2021-02-17 PROCEDURE — 99999 PR PBB SHADOW E&M-EST. PATIENT-LVL III: ICD-10-PCS | Mod: PBBFAC,,, | Performed by: PHYSICIAN ASSISTANT

## 2021-02-17 PROCEDURE — 3008F BODY MASS INDEX DOCD: CPT | Mod: CPTII,S$GLB,, | Performed by: PHYSICIAN ASSISTANT

## 2021-02-17 PROCEDURE — 99213 OFFICE O/P EST LOW 20 MIN: CPT | Mod: 25,S$GLB,, | Performed by: PHYSICIAN ASSISTANT

## 2021-02-17 PROCEDURE — 3008F PR BODY MASS INDEX (BMI) DOCUMENTED: ICD-10-PCS | Mod: CPTII,S$GLB,, | Performed by: PHYSICIAN ASSISTANT

## 2021-02-17 PROCEDURE — 1125F PR PAIN SEVERITY QUANTIFIED, PAIN PRESENT: ICD-10-PCS | Mod: S$GLB,,, | Performed by: PHYSICIAN ASSISTANT

## 2021-02-17 PROCEDURE — 99999 PR PBB SHADOW E&M-EST. PATIENT-LVL III: CPT | Mod: PBBFAC,,, | Performed by: PHYSICIAN ASSISTANT

## 2021-02-17 PROCEDURE — 20610 DRAIN/INJ JOINT/BURSA W/O US: CPT | Mod: LT,S$GLB,, | Performed by: PHYSICIAN ASSISTANT

## 2021-02-17 RX ORDER — TRIAMCINOLONE ACETONIDE 40 MG/ML
40 INJECTION, SUSPENSION INTRA-ARTICULAR; INTRAMUSCULAR
Status: DISCONTINUED | OUTPATIENT
Start: 2021-02-17 | End: 2021-02-17 | Stop reason: HOSPADM

## 2021-02-17 RX ADMIN — TRIAMCINOLONE ACETONIDE 40 MG: 40 INJECTION, SUSPENSION INTRA-ARTICULAR; INTRAMUSCULAR at 02:02

## 2021-02-23 ENCOUNTER — PATIENT MESSAGE (OUTPATIENT)
Dept: ORTHOPEDICS | Facility: CLINIC | Age: 41
End: 2021-02-23

## 2021-03-01 ENCOUNTER — PATIENT MESSAGE (OUTPATIENT)
Dept: ORTHOPEDICS | Facility: CLINIC | Age: 41
End: 2021-03-01

## 2021-03-01 DIAGNOSIS — M17.12 PRIMARY OSTEOARTHRITIS OF LEFT KNEE: Primary | ICD-10-CM

## 2021-03-03 ENCOUNTER — IMMUNIZATION (OUTPATIENT)
Dept: FAMILY MEDICINE | Facility: CLINIC | Age: 41
End: 2021-03-03
Payer: COMMERCIAL

## 2021-03-03 ENCOUNTER — OFFICE VISIT (OUTPATIENT)
Dept: ORTHOPEDICS | Facility: CLINIC | Age: 41
End: 2021-03-03
Payer: COMMERCIAL

## 2021-03-03 VITALS
SYSTOLIC BLOOD PRESSURE: 131 MMHG | DIASTOLIC BLOOD PRESSURE: 86 MMHG | BODY MASS INDEX: 37.01 KG/M2 | WEIGHT: 215.63 LBS | HEART RATE: 101 BPM

## 2021-03-03 DIAGNOSIS — M25.562 ACUTE PAIN OF LEFT KNEE: ICD-10-CM

## 2021-03-03 DIAGNOSIS — Z23 NEED FOR VACCINATION: Primary | ICD-10-CM

## 2021-03-03 DIAGNOSIS — M17.12 PRIMARY OSTEOARTHRITIS OF LEFT KNEE: Primary | ICD-10-CM

## 2021-03-03 PROCEDURE — 1125F AMNT PAIN NOTED PAIN PRSNT: CPT | Mod: S$GLB,,, | Performed by: PHYSICIAN ASSISTANT

## 2021-03-03 PROCEDURE — 91300 COVID-19, MRNA, LNP-S, PF, 30 MCG/0.3 ML DOSE VACCINE: CPT | Mod: PBBFAC | Performed by: FAMILY MEDICINE

## 2021-03-03 PROCEDURE — 20610 DRAIN/INJ JOINT/BURSA W/O US: CPT | Mod: LT,S$GLB,, | Performed by: PHYSICIAN ASSISTANT

## 2021-03-03 PROCEDURE — 0002A PR IMMUNIZ ADMIN, SARS-COV-2 COVID-19 VACC, 30MCG/0.3ML, 2ND DOSE: CPT | Mod: CV19,,, | Performed by: FAMILY MEDICINE

## 2021-03-03 PROCEDURE — 3008F BODY MASS INDEX DOCD: CPT | Mod: CPTII,S$GLB,, | Performed by: PHYSICIAN ASSISTANT

## 2021-03-03 PROCEDURE — 99499 UNLISTED E&M SERVICE: CPT | Mod: S$GLB,,, | Performed by: PHYSICIAN ASSISTANT

## 2021-03-03 PROCEDURE — 20610 LARGE JOINT ASPIRATION/INJECTION: L KNEE: ICD-10-PCS | Mod: LT,S$GLB,, | Performed by: PHYSICIAN ASSISTANT

## 2021-03-03 PROCEDURE — 99999 PR PBB SHADOW E&M-EST. PATIENT-LVL III: CPT | Mod: PBBFAC,,, | Performed by: PHYSICIAN ASSISTANT

## 2021-03-03 PROCEDURE — 0001A PR IMMUNIZ ADMIN, SARS-COV-2 COVID-19 VACC, 30MCG/0.3ML, 1ST DOSE: CPT | Mod: PBBFAC

## 2021-03-03 PROCEDURE — 1125F PR PAIN SEVERITY QUANTIFIED, PAIN PRESENT: ICD-10-PCS | Mod: S$GLB,,, | Performed by: PHYSICIAN ASSISTANT

## 2021-03-03 PROCEDURE — 0002A PR IMMUNIZ ADMIN, SARS-COV-2 COVID-19 VACC, 30MCG/0.3ML, 2ND DOSE: ICD-10-PCS | Mod: CV19,,, | Performed by: FAMILY MEDICINE

## 2021-03-03 PROCEDURE — 99499 NO LOS: ICD-10-PCS | Mod: S$GLB,,, | Performed by: PHYSICIAN ASSISTANT

## 2021-03-03 PROCEDURE — 99999 PR PBB SHADOW E&M-EST. PATIENT-LVL III: ICD-10-PCS | Mod: PBBFAC,,, | Performed by: PHYSICIAN ASSISTANT

## 2021-03-03 PROCEDURE — 3008F PR BODY MASS INDEX (BMI) DOCUMENTED: ICD-10-PCS | Mod: CPTII,S$GLB,, | Performed by: PHYSICIAN ASSISTANT

## 2021-03-27 ENCOUNTER — IMMUNIZATION (OUTPATIENT)
Dept: FAMILY MEDICINE | Facility: CLINIC | Age: 41
End: 2021-03-27
Payer: COMMERCIAL

## 2021-03-27 DIAGNOSIS — Z23 NEED FOR VACCINATION: Primary | ICD-10-CM

## 2021-03-27 PROCEDURE — 91300 COVID-19, MRNA, LNP-S, PF, 30 MCG/0.3 ML DOSE VACCINE: CPT | Mod: PBBFAC | Performed by: FAMILY MEDICINE

## 2021-03-27 PROCEDURE — 0002A COVID-19, MRNA, LNP-S, PF, 30 MCG/0.3 ML DOSE VACCINE: CPT | Mod: PBBFAC | Performed by: FAMILY MEDICINE

## 2021-04-06 ENCOUNTER — OFFICE VISIT (OUTPATIENT)
Dept: ORTHOPEDICS | Facility: CLINIC | Age: 41
End: 2021-04-06
Payer: COMMERCIAL

## 2021-04-06 VITALS
DIASTOLIC BLOOD PRESSURE: 75 MMHG | HEIGHT: 64 IN | BODY MASS INDEX: 36.84 KG/M2 | WEIGHT: 215.81 LBS | SYSTOLIC BLOOD PRESSURE: 118 MMHG | HEART RATE: 115 BPM

## 2021-04-06 DIAGNOSIS — F41.9 ANXIETY AND DEPRESSION: ICD-10-CM

## 2021-04-06 DIAGNOSIS — N30.10 INTERSTITIAL CYSTITIS: ICD-10-CM

## 2021-04-06 DIAGNOSIS — F32.A ANXIETY AND DEPRESSION: ICD-10-CM

## 2021-04-06 DIAGNOSIS — Z01.818 PRE-OP TESTING: ICD-10-CM

## 2021-04-06 DIAGNOSIS — M17.12 PRIMARY OSTEOARTHRITIS OF LEFT KNEE: Primary | ICD-10-CM

## 2021-04-06 DIAGNOSIS — E78.49 OTHER HYPERLIPIDEMIA: ICD-10-CM

## 2021-04-06 DIAGNOSIS — N20.0 NEPHROLITHIASIS: ICD-10-CM

## 2021-04-06 DIAGNOSIS — M25.562 ACUTE PAIN OF LEFT KNEE: ICD-10-CM

## 2021-04-06 DIAGNOSIS — G62.9 NEUROPATHY: ICD-10-CM

## 2021-04-06 DIAGNOSIS — N13.5 URETERAL STRICTURE, LEFT: ICD-10-CM

## 2021-04-06 PROCEDURE — 3008F PR BODY MASS INDEX (BMI) DOCUMENTED: ICD-10-PCS | Mod: CPTII,S$GLB,, | Performed by: ORTHOPAEDIC SURGERY

## 2021-04-06 PROCEDURE — 99999 PR PBB SHADOW E&M-EST. PATIENT-LVL V: CPT | Mod: PBBFAC,,, | Performed by: ORTHOPAEDIC SURGERY

## 2021-04-06 PROCEDURE — 99215 PR OFFICE/OUTPT VISIT, EST, LEVL V, 40-54 MIN: ICD-10-PCS | Mod: S$GLB,,, | Performed by: ORTHOPAEDIC SURGERY

## 2021-04-06 PROCEDURE — 1125F AMNT PAIN NOTED PAIN PRSNT: CPT | Mod: S$GLB,,, | Performed by: ORTHOPAEDIC SURGERY

## 2021-04-06 PROCEDURE — 99215 OFFICE O/P EST HI 40 MIN: CPT | Mod: S$GLB,,, | Performed by: ORTHOPAEDIC SURGERY

## 2021-04-06 PROCEDURE — 99999 PR PBB SHADOW E&M-EST. PATIENT-LVL V: ICD-10-PCS | Mod: PBBFAC,,, | Performed by: ORTHOPAEDIC SURGERY

## 2021-04-06 PROCEDURE — 3008F BODY MASS INDEX DOCD: CPT | Mod: CPTII,S$GLB,, | Performed by: ORTHOPAEDIC SURGERY

## 2021-04-06 PROCEDURE — 1125F PR PAIN SEVERITY QUANTIFIED, PAIN PRESENT: ICD-10-PCS | Mod: S$GLB,,, | Performed by: ORTHOPAEDIC SURGERY

## 2021-04-13 ENCOUNTER — HOSPITAL ENCOUNTER (EMERGENCY)
Facility: HOSPITAL | Age: 41
Discharge: HOME | End: 2021-04-13
Attending: EMERGENCY MEDICINE
Payer: COMMERCIAL

## 2021-04-13 VITALS
RESPIRATION RATE: 18 BRPM | OXYGEN SATURATION: 98 % | DIASTOLIC BLOOD PRESSURE: 81 MMHG | WEIGHT: 210 LBS | HEART RATE: 91 BPM | TEMPERATURE: 98 F | BODY MASS INDEX: 35.85 KG/M2 | HEIGHT: 64 IN | SYSTOLIC BLOOD PRESSURE: 135 MMHG

## 2021-04-13 DIAGNOSIS — N12 PYELONEPHRITIS: Primary | ICD-10-CM

## 2021-04-13 DIAGNOSIS — N20.0 KIDNEY STONE: ICD-10-CM

## 2021-04-13 LAB
ALBUMIN SERPL BCP-MCNC: 4.3 G/DL (ref 3.5–5.2)
ALP SERPL-CCNC: 111 U/L (ref 38–126)
ALT SERPL W/O P-5'-P-CCNC: 17 U/L (ref 10–44)
ANION GAP SERPL CALC-SCNC: 8 MMOL/L (ref 8–16)
AST SERPL-CCNC: 24 U/L (ref 15–46)
BACTERIA #/AREA URNS AUTO: ABNORMAL /HPF
BASOPHILS # BLD AUTO: 0.07 K/UL (ref 0–0.2)
BASOPHILS NFR BLD: 0.9 % (ref 0–1.9)
BILIRUB SERPL-MCNC: 0.3 MG/DL (ref 0.1–1)
BILIRUB UR QL STRIP: NEGATIVE
CALCIUM SERPL-MCNC: 9.5 MG/DL (ref 8.7–10.5)
CHLORIDE SERPL-SCNC: 103 MMOL/L (ref 95–110)
CLARITY UR REFRACT.AUTO: ABNORMAL
CO2 SERPL-SCNC: 29 MMOL/L (ref 23–29)
COLOR UR AUTO: YELLOW
CREAT SERPL-MCNC: 0.92 MG/DL (ref 0.5–1.4)
DIFFERENTIAL METHOD: ABNORMAL
EOSINOPHIL # BLD AUTO: 0.1 K/UL (ref 0–0.5)
EOSINOPHIL NFR BLD: 1.1 % (ref 0–8)
ERYTHROCYTE [DISTWIDTH] IN BLOOD BY AUTOMATED COUNT: 13.2 % (ref 11.5–14.5)
EST. GFR  (AFRICAN AMERICAN): >60 ML/MIN/1.73 M^2
EST. GFR  (NON AFRICAN AMERICAN): >60 ML/MIN/1.73 M^2
GLUCOSE SERPL-MCNC: 121 MG/DL (ref 70–110)
GLUCOSE UR QL STRIP: NEGATIVE
HCT VFR BLD AUTO: 38.5 % (ref 37–48.5)
HGB BLD-MCNC: 12.5 G/DL (ref 12–16)
HGB UR QL STRIP: ABNORMAL
IMM GRANULOCYTES # BLD AUTO: 0.07 K/UL (ref 0–0.04)
IMM GRANULOCYTES NFR BLD AUTO: 0.9 % (ref 0–0.5)
KETONES UR QL STRIP: NEGATIVE
LEUKOCYTE ESTERASE UR QL STRIP: ABNORMAL
LYMPHOCYTES # BLD AUTO: 1.9 K/UL (ref 1–4.8)
LYMPHOCYTES NFR BLD: 23.6 % (ref 18–48)
MCH RBC QN AUTO: 28.7 PG (ref 27–31)
MCHC RBC AUTO-ENTMCNC: 32.5 G/DL (ref 32–36)
MCV RBC AUTO: 89 FL (ref 82–98)
MICROSCOPIC COMMENT: ABNORMAL
MONOCYTES # BLD AUTO: 0.7 K/UL (ref 0.3–1)
MONOCYTES NFR BLD: 8 % (ref 4–15)
NEUTROPHILS # BLD AUTO: 5.3 K/UL (ref 1.8–7.7)
NEUTROPHILS NFR BLD: 65.5 % (ref 38–73)
NITRITE UR QL STRIP: POSITIVE
NRBC BLD-RTO: 0 /100 WBC
PH UR STRIP: 6 [PH] (ref 5–8)
PLATELET # BLD AUTO: 283 K/UL (ref 150–450)
PMV BLD AUTO: 11.5 FL (ref 9.2–12.9)
POTASSIUM SERPL-SCNC: 3.9 MMOL/L (ref 3.5–5.1)
PROT SERPL-MCNC: 7.6 G/DL (ref 6–8.4)
PROT UR QL STRIP: NEGATIVE
RBC # BLD AUTO: 4.35 M/UL (ref 4–5.4)
RBC #/AREA URNS AUTO: 2 /HPF (ref 0–4)
SODIUM SERPL-SCNC: 140 MMOL/L (ref 136–145)
SP GR UR STRIP: 1.02 (ref 1–1.03)
URN SPEC COLLECT METH UR: ABNORMAL
UROBILINOGEN UR STRIP-ACNC: NEGATIVE EU/DL
UUN UR-MCNC: 17 MG/DL (ref 7–17)
WBC # BLD AUTO: 8.12 K/UL (ref 3.9–12.7)
WBC #/AREA URNS AUTO: 20 /HPF (ref 0–5)

## 2021-04-13 PROCEDURE — 87186 SC STD MICRODIL/AGAR DIL: CPT | Mod: ER | Performed by: EMERGENCY MEDICINE

## 2021-04-13 PROCEDURE — 96365 THER/PROPH/DIAG IV INF INIT: CPT | Mod: ER

## 2021-04-13 PROCEDURE — 96375 TX/PRO/DX INJ NEW DRUG ADDON: CPT | Mod: ER

## 2021-04-13 PROCEDURE — 87088 URINE BACTERIA CULTURE: CPT | Mod: ER | Performed by: EMERGENCY MEDICINE

## 2021-04-13 PROCEDURE — 87077 CULTURE AEROBIC IDENTIFY: CPT | Mod: ER | Performed by: EMERGENCY MEDICINE

## 2021-04-13 PROCEDURE — 85025 COMPLETE CBC W/AUTO DIFF WBC: CPT | Mod: ER | Performed by: EMERGENCY MEDICINE

## 2021-04-13 PROCEDURE — 81000 URINALYSIS NONAUTO W/SCOPE: CPT | Mod: ER | Performed by: EMERGENCY MEDICINE

## 2021-04-13 PROCEDURE — 96361 HYDRATE IV INFUSION ADD-ON: CPT | Mod: ER

## 2021-04-13 PROCEDURE — 80053 COMPREHEN METABOLIC PANEL: CPT | Mod: ER | Performed by: EMERGENCY MEDICINE

## 2021-04-13 PROCEDURE — 99285 EMERGENCY DEPT VISIT HI MDM: CPT | Mod: 25,ER

## 2021-04-13 PROCEDURE — 63600175 PHARM REV CODE 636 W HCPCS: Mod: ER | Performed by: EMERGENCY MEDICINE

## 2021-04-13 PROCEDURE — 87086 URINE CULTURE/COLONY COUNT: CPT | Mod: ER | Performed by: EMERGENCY MEDICINE

## 2021-04-13 PROCEDURE — 25000003 PHARM REV CODE 250: Mod: ER | Performed by: EMERGENCY MEDICINE

## 2021-04-13 PROCEDURE — 96376 TX/PRO/DX INJ SAME DRUG ADON: CPT | Mod: ER

## 2021-04-13 RX ORDER — SODIUM CHLORIDE 9 MG/ML
125 INJECTION, SOLUTION INTRAVENOUS CONTINUOUS
Status: DISCONTINUED | OUTPATIENT
Start: 2021-04-13 | End: 2021-04-13 | Stop reason: HOSPADM

## 2021-04-13 RX ORDER — SULFAMETHOXAZOLE AND TRIMETHOPRIM 800; 160 MG/1; MG/1
1 TABLET ORAL 2 TIMES DAILY
Qty: 20 TABLET | Refills: 0 | Status: SHIPPED | OUTPATIENT
Start: 2021-04-13 | End: 2021-04-23

## 2021-04-13 RX ORDER — MORPHINE SULFATE 4 MG/ML
4 INJECTION, SOLUTION INTRAMUSCULAR; INTRAVENOUS
Status: COMPLETED | OUTPATIENT
Start: 2021-04-13 | End: 2021-04-13

## 2021-04-13 RX ORDER — TAMSULOSIN HYDROCHLORIDE 0.4 MG/1
0.4 CAPSULE ORAL DAILY
Qty: 7 CAPSULE | Refills: 0 | Status: SHIPPED | OUTPATIENT
Start: 2021-04-13 | End: 2021-05-12 | Stop reason: CLARIF

## 2021-04-13 RX ORDER — NITROFURANTOIN 25; 75 MG/1; MG/1
100 CAPSULE ORAL 2 TIMES DAILY
Qty: 20 CAPSULE | Refills: 0 | Status: SHIPPED | OUTPATIENT
Start: 2021-04-13 | End: 2021-04-18

## 2021-04-13 RX ORDER — IBUPROFEN 600 MG/1
600 TABLET ORAL EVERY 6 HOURS PRN
Qty: 20 TABLET | Refills: 0 | Status: SHIPPED | OUTPATIENT
Start: 2021-04-13 | End: 2021-05-17 | Stop reason: HOSPADM

## 2021-04-13 RX ORDER — HYDROCODONE BITARTRATE AND ACETAMINOPHEN 5; 325 MG/1; MG/1
1 TABLET ORAL EVERY 6 HOURS PRN
Qty: 5 TABLET | Refills: 0 | Status: SHIPPED | OUTPATIENT
Start: 2021-04-13 | End: 2021-05-12 | Stop reason: CLARIF

## 2021-04-13 RX ORDER — KETOROLAC TROMETHAMINE 30 MG/ML
10 INJECTION, SOLUTION INTRAMUSCULAR; INTRAVENOUS
Status: COMPLETED | OUTPATIENT
Start: 2021-04-13 | End: 2021-04-13

## 2021-04-13 RX ORDER — ONDANSETRON 2 MG/ML
4 INJECTION INTRAMUSCULAR; INTRAVENOUS
Status: COMPLETED | OUTPATIENT
Start: 2021-04-13 | End: 2021-04-13

## 2021-04-13 RX ADMIN — MORPHINE SULFATE 4 MG: 4 INJECTION INTRAVENOUS at 02:04

## 2021-04-13 RX ADMIN — KETOROLAC TROMETHAMINE 10 MG: 30 INJECTION, SOLUTION INTRAMUSCULAR at 01:04

## 2021-04-13 RX ADMIN — ONDANSETRON 4 MG: 2 INJECTION INTRAMUSCULAR; INTRAVENOUS at 01:04

## 2021-04-13 RX ADMIN — MORPHINE SULFATE 4 MG: 4 INJECTION INTRAVENOUS at 01:04

## 2021-04-13 RX ADMIN — SODIUM CHLORIDE 125 ML/HR: 0.9 INJECTION, SOLUTION INTRAVENOUS at 01:04

## 2021-04-13 RX ADMIN — CEFTRIAXONE 1 G: 1 INJECTION, SOLUTION INTRAVENOUS at 01:04

## 2021-04-16 LAB — BACTERIA UR CULT: ABNORMAL

## 2021-04-20 ENCOUNTER — PATIENT MESSAGE (OUTPATIENT)
Dept: SURGERY | Facility: HOSPITAL | Age: 41
End: 2021-04-20

## 2021-04-20 RX ORDER — DICLOFENAC SODIUM 75 MG/1
75 TABLET, DELAYED RELEASE ORAL 2 TIMES DAILY
Qty: 28 TABLET | Refills: 0 | Status: SHIPPED | OUTPATIENT
Start: 2021-04-20 | End: 2021-05-04

## 2021-04-26 ENCOUNTER — PATIENT MESSAGE (OUTPATIENT)
Dept: SURGERY | Facility: HOSPITAL | Age: 41
End: 2021-04-26

## 2021-05-10 ENCOUNTER — PATIENT MESSAGE (OUTPATIENT)
Dept: ORTHOPEDICS | Facility: CLINIC | Age: 41
End: 2021-05-10

## 2021-05-11 ENCOUNTER — ANESTHESIA EVENT (OUTPATIENT)
Dept: SURGERY | Facility: HOSPITAL | Age: 41
End: 2021-05-11
Payer: COMMERCIAL

## 2021-05-12 ENCOUNTER — CLINICAL SUPPORT (OUTPATIENT)
Dept: LAB | Facility: HOSPITAL | Age: 41
End: 2021-05-12
Attending: ORTHOPAEDIC SURGERY
Payer: COMMERCIAL

## 2021-05-12 ENCOUNTER — OFFICE VISIT (OUTPATIENT)
Dept: FAMILY MEDICINE | Facility: HOSPITAL | Age: 41
End: 2021-05-12
Payer: COMMERCIAL

## 2021-05-12 ENCOUNTER — HOSPITAL ENCOUNTER (OUTPATIENT)
Dept: PREADMISSION TESTING | Facility: HOSPITAL | Age: 41
Discharge: HOME | End: 2021-05-12
Attending: ORTHOPAEDIC SURGERY
Payer: COMMERCIAL

## 2021-05-12 ENCOUNTER — HOSPITAL ENCOUNTER (OUTPATIENT)
Dept: RADIOLOGY | Facility: HOSPITAL | Age: 41
Discharge: HOME | End: 2021-05-12
Attending: ORTHOPAEDIC SURGERY
Payer: COMMERCIAL

## 2021-05-12 VITALS
BODY MASS INDEX: 36.88 KG/M2 | HEIGHT: 64 IN | HEIGHT: 64 IN | DIASTOLIC BLOOD PRESSURE: 77 MMHG | SYSTOLIC BLOOD PRESSURE: 118 MMHG | RESPIRATION RATE: 16 BRPM | DIASTOLIC BLOOD PRESSURE: 79 MMHG | HEART RATE: 105 BPM | WEIGHT: 216 LBS | OXYGEN SATURATION: 98 % | BODY MASS INDEX: 37.23 KG/M2 | SYSTOLIC BLOOD PRESSURE: 127 MMHG | HEART RATE: 104 BPM | WEIGHT: 218.06 LBS

## 2021-05-12 DIAGNOSIS — N13.5 URETERAL STRICTURE, LEFT: ICD-10-CM

## 2021-05-12 DIAGNOSIS — F41.9 ANXIETY AND DEPRESSION: ICD-10-CM

## 2021-05-12 DIAGNOSIS — M25.562 ACUTE PAIN OF LEFT KNEE: ICD-10-CM

## 2021-05-12 DIAGNOSIS — G62.9 NEUROPATHY: ICD-10-CM

## 2021-05-12 DIAGNOSIS — N30.10 INTERSTITIAL CYSTITIS: ICD-10-CM

## 2021-05-12 DIAGNOSIS — N20.0 NEPHROLITHIASIS: ICD-10-CM

## 2021-05-12 DIAGNOSIS — Z01.818 PREOP EXAMINATION: Primary | ICD-10-CM

## 2021-05-12 DIAGNOSIS — M17.12 PRIMARY OSTEOARTHRITIS OF LEFT KNEE: ICD-10-CM

## 2021-05-12 DIAGNOSIS — E78.49 OTHER HYPERLIPIDEMIA: ICD-10-CM

## 2021-05-12 DIAGNOSIS — F32.A ANXIETY AND DEPRESSION: ICD-10-CM

## 2021-05-12 PROCEDURE — 77073 BONE LENGTH STUDIES: CPT | Mod: 26,,, | Performed by: RADIOLOGY

## 2021-05-12 PROCEDURE — 71045 X-RAY EXAM CHEST 1 VIEW: CPT | Mod: 26,,, | Performed by: RADIOLOGY

## 2021-05-12 PROCEDURE — 93010 ELECTROCARDIOGRAM REPORT: CPT | Mod: ,,, | Performed by: INTERNAL MEDICINE

## 2021-05-12 PROCEDURE — 71045 X-RAY EXAM CHEST 1 VIEW: CPT | Mod: TC,FY

## 2021-05-12 PROCEDURE — 93005 ELECTROCARDIOGRAM TRACING: CPT

## 2021-05-12 PROCEDURE — 77073 BONE LENGTH STUDIES: CPT | Mod: TC,FY

## 2021-05-12 PROCEDURE — 93010 EKG 12-LEAD: ICD-10-PCS | Mod: ,,, | Performed by: INTERNAL MEDICINE

## 2021-05-12 PROCEDURE — 99214 OFFICE O/P EST MOD 30 MIN: CPT | Mod: 25 | Performed by: STUDENT IN AN ORGANIZED HEALTH CARE EDUCATION/TRAINING PROGRAM

## 2021-05-12 PROCEDURE — 71045 XR CHEST 1 VIEW PRE-OP: ICD-10-PCS | Mod: 26,,, | Performed by: RADIOLOGY

## 2021-05-12 PROCEDURE — 77073 XR HIP TO ANKLE: ICD-10-PCS | Mod: 26,,, | Performed by: RADIOLOGY

## 2021-05-12 RX ORDER — SODIUM CHLORIDE, SODIUM LACTATE, POTASSIUM CHLORIDE, CALCIUM CHLORIDE 600; 310; 30; 20 MG/100ML; MG/100ML; MG/100ML; MG/100ML
INJECTION, SOLUTION INTRAVENOUS CONTINUOUS
Status: CANCELLED | OUTPATIENT
Start: 2021-05-12

## 2021-05-12 RX ORDER — LIDOCAINE HYDROCHLORIDE 10 MG/ML
1 INJECTION, SOLUTION EPIDURAL; INFILTRATION; INTRACAUDAL; PERINEURAL ONCE
Status: CANCELLED | OUTPATIENT
Start: 2021-05-12 | End: 2021-05-12

## 2021-05-17 RX ORDER — PREGABALIN 75 MG/1
75 CAPSULE ORAL 2 TIMES DAILY
Status: CANCELLED | OUTPATIENT
Start: 2021-05-17

## 2021-05-17 RX ORDER — ACETAMINOPHEN 325 MG/1
650 TABLET ORAL
Status: CANCELLED | OUTPATIENT
Start: 2021-05-17

## 2021-05-17 RX ORDER — SENNOSIDES 8.6 MG/1
8.6 TABLET ORAL 2 TIMES DAILY
Status: CANCELLED | OUTPATIENT
Start: 2021-05-17

## 2021-05-17 RX ORDER — TRANEXAMIC ACID 100 MG/ML
1000 INJECTION, SOLUTION INTRAVENOUS
Status: CANCELLED | OUTPATIENT
Start: 2021-05-17 | End: 2021-05-18

## 2021-05-17 RX ORDER — BISACODYL 10 MG
10 SUPPOSITORY, RECTAL RECTAL 2 TIMES DAILY PRN
Status: CANCELLED | OUTPATIENT
Start: 2021-05-17

## 2021-05-17 RX ORDER — FAMOTIDINE 20 MG/1
20 TABLET, FILM COATED ORAL 2 TIMES DAILY
Status: CANCELLED | OUTPATIENT
Start: 2021-05-17

## 2021-05-17 RX ORDER — CEFAZOLIN SODIUM 2 G/50ML
2 SOLUTION INTRAVENOUS
Status: CANCELLED | OUTPATIENT
Start: 2021-05-17 | End: 2021-05-18

## 2021-05-17 RX ORDER — CELECOXIB 100 MG/1
200 CAPSULE ORAL 2 TIMES DAILY
Status: CANCELLED | OUTPATIENT
Start: 2021-05-17

## 2021-05-19 DIAGNOSIS — R89.9 ABNORMAL LABORATORY TEST: Primary | ICD-10-CM

## 2021-05-20 ENCOUNTER — PROCEDURE VISIT (OUTPATIENT)
Dept: ORTHOPEDICS | Facility: CLINIC | Age: 41
End: 2021-05-20
Payer: COMMERCIAL

## 2021-05-20 VITALS
HEIGHT: 64 IN | HEART RATE: 89 BPM | WEIGHT: 216.94 LBS | SYSTOLIC BLOOD PRESSURE: 130 MMHG | DIASTOLIC BLOOD PRESSURE: 93 MMHG | BODY MASS INDEX: 37.04 KG/M2

## 2021-05-20 DIAGNOSIS — G62.9 NEUROPATHY: ICD-10-CM

## 2021-05-20 DIAGNOSIS — F32.A ANXIETY AND DEPRESSION: ICD-10-CM

## 2021-05-20 DIAGNOSIS — N13.5 URETERAL STRICTURE, LEFT: ICD-10-CM

## 2021-05-20 DIAGNOSIS — N20.0 NEPHROLITHIASIS: ICD-10-CM

## 2021-05-20 DIAGNOSIS — F41.9 ANXIETY AND DEPRESSION: ICD-10-CM

## 2021-05-20 DIAGNOSIS — N30.10 INTERSTITIAL CYSTITIS: ICD-10-CM

## 2021-05-20 DIAGNOSIS — E78.49 OTHER HYPERLIPIDEMIA: ICD-10-CM

## 2021-05-20 DIAGNOSIS — M25.562 ACUTE PAIN OF LEFT KNEE: ICD-10-CM

## 2021-05-20 DIAGNOSIS — M17.12 PRIMARY OSTEOARTHRITIS OF LEFT KNEE: ICD-10-CM

## 2021-05-20 PROCEDURE — 99499 NO LOS: ICD-10-PCS | Mod: S$GLB,,, | Performed by: ORTHOPAEDIC SURGERY

## 2021-05-20 PROCEDURE — 64640 INJECTION TREATMENT OF NERVE: CPT | Mod: LT,S$GLB,, | Performed by: ORTHOPAEDIC SURGERY

## 2021-05-20 PROCEDURE — 99499 UNLISTED E&M SERVICE: CPT | Mod: S$GLB,,, | Performed by: ORTHOPAEDIC SURGERY

## 2021-05-20 PROCEDURE — 64640 PR DESTRUCT BY NEURO AGENT; OTHER PERIPH NERVE: ICD-10-PCS | Mod: LT,S$GLB,, | Performed by: ORTHOPAEDIC SURGERY

## 2021-05-21 ENCOUNTER — PATIENT MESSAGE (OUTPATIENT)
Dept: SURGERY | Facility: HOSPITAL | Age: 41
End: 2021-05-21

## 2021-05-21 ENCOUNTER — LAB VISIT (OUTPATIENT)
Dept: FAMILY MEDICINE | Facility: CLINIC | Age: 41
End: 2021-05-21
Payer: COMMERCIAL

## 2021-05-21 DIAGNOSIS — Z01.818 PRE-OP TESTING: ICD-10-CM

## 2021-05-21 PROCEDURE — U0003 INFECTIOUS AGENT DETECTION BY NUCLEIC ACID (DNA OR RNA); SEVERE ACUTE RESPIRATORY SYNDROME CORONAVIRUS 2 (SARS-COV-2) (CORONAVIRUS DISEASE [COVID-19]), AMPLIFIED PROBE TECHNIQUE, MAKING USE OF HIGH THROUGHPUT TECHNOLOGIES AS DESCRIBED BY CMS-2020-01-R: HCPCS | Performed by: ORTHOPAEDIC SURGERY

## 2021-05-21 PROCEDURE — U0005 INFEC AGEN DETEC AMPLI PROBE: HCPCS | Performed by: ORTHOPAEDIC SURGERY

## 2021-05-22 LAB — SARS-COV-2 RNA RESP QL NAA+PROBE: NOT DETECTED

## 2021-05-24 ENCOUNTER — ANESTHESIA (OUTPATIENT)
Dept: SURGERY | Facility: HOSPITAL | Age: 41
End: 2021-05-24
Payer: COMMERCIAL

## 2021-05-24 ENCOUNTER — HOSPITAL ENCOUNTER (OUTPATIENT)
Facility: HOSPITAL | Age: 41
Discharge: HOME | End: 2021-05-24
Attending: ORTHOPAEDIC SURGERY | Admitting: ORTHOPAEDIC SURGERY
Payer: COMMERCIAL

## 2021-05-24 VITALS
SYSTOLIC BLOOD PRESSURE: 132 MMHG | TEMPERATURE: 98 F | HEART RATE: 94 BPM | DIASTOLIC BLOOD PRESSURE: 74 MMHG | RESPIRATION RATE: 20 BRPM | OXYGEN SATURATION: 99 %

## 2021-05-24 DIAGNOSIS — M17.12 PRIMARY OSTEOARTHRITIS OF LEFT KNEE: Primary | ICD-10-CM

## 2021-05-24 DIAGNOSIS — M17.12 OSTEOARTHRITIS OF LEFT KNEE: ICD-10-CM

## 2021-05-24 LAB
APPEARANCE FLD: NORMAL
BODY FLD TYPE: NORMAL
COLOR FLD: NORMAL
LYMPHOCYTES NFR FLD MANUAL: 59 %
MESOTHL CELL NFR FLD MANUAL: 13 %
MONOS+MACROS NFR FLD MANUAL: 5 %
NEUTROPHILS NFR FLD MANUAL: 23 %
WBC # FLD: 148 /CU MM

## 2021-05-24 PROCEDURE — 97165 OT EVAL LOW COMPLEX 30 MIN: CPT

## 2021-05-24 PROCEDURE — 71000033 HC RECOVERY, INTIAL HOUR: Performed by: ORTHOPAEDIC SURGERY

## 2021-05-24 PROCEDURE — 63600175 PHARM REV CODE 636 W HCPCS: Performed by: NURSE PRACTITIONER

## 2021-05-24 PROCEDURE — 25000003 PHARM REV CODE 250: Performed by: STUDENT IN AN ORGANIZED HEALTH CARE EDUCATION/TRAINING PROGRAM

## 2021-05-24 PROCEDURE — 71000039 HC RECOVERY, EACH ADD'L HOUR: Performed by: ORTHOPAEDIC SURGERY

## 2021-05-24 PROCEDURE — 89051 BODY FLUID CELL COUNT: CPT | Performed by: ORTHOPAEDIC SURGERY

## 2021-05-24 PROCEDURE — C1776 JOINT DEVICE (IMPLANTABLE): HCPCS | Performed by: ORTHOPAEDIC SURGERY

## 2021-05-24 PROCEDURE — 76942 ECHO GUIDE FOR BIOPSY: CPT | Mod: 59 | Performed by: STUDENT IN AN ORGANIZED HEALTH CARE EDUCATION/TRAINING PROGRAM

## 2021-05-24 PROCEDURE — 20985 PR CPTR-ASST SURGICAL NAVIGATION IMAGE-LESS: ICD-10-PCS | Mod: ,,, | Performed by: ORTHOPAEDIC SURGERY

## 2021-05-24 PROCEDURE — 97161 PT EVAL LOW COMPLEX 20 MIN: CPT

## 2021-05-24 PROCEDURE — 63600175 PHARM REV CODE 636 W HCPCS: Performed by: STUDENT IN AN ORGANIZED HEALTH CARE EDUCATION/TRAINING PROGRAM

## 2021-05-24 PROCEDURE — 97535 SELF CARE MNGMENT TRAINING: CPT | Mod: 97

## 2021-05-24 PROCEDURE — 97116 GAIT TRAINING THERAPY: CPT | Mod: 97

## 2021-05-24 PROCEDURE — 36000711: Performed by: ORTHOPAEDIC SURGERY

## 2021-05-24 PROCEDURE — 27200688 HC TRAY, SPINAL-HYPER/ ISOBARIC: Performed by: ANESTHESIOLOGY

## 2021-05-24 PROCEDURE — C9290 INJ, BUPIVACAINE LIPOSOME: HCPCS | Performed by: STUDENT IN AN ORGANIZED HEALTH CARE EDUCATION/TRAINING PROGRAM

## 2021-05-24 PROCEDURE — 71000016 HC POSTOP RECOV ADDL HR: Performed by: ORTHOPAEDIC SURGERY

## 2021-05-24 PROCEDURE — C1713 ANCHOR/SCREW BN/BN,TIS/BN: HCPCS | Performed by: ORTHOPAEDIC SURGERY

## 2021-05-24 PROCEDURE — 25000003 PHARM REV CODE 250: Performed by: NURSE ANESTHETIST, CERTIFIED REGISTERED

## 2021-05-24 PROCEDURE — 27447 PR TOTAL KNEE ARTHROPLASTY: ICD-10-PCS | Mod: 58,LT,, | Performed by: ORTHOPAEDIC SURGERY

## 2021-05-24 PROCEDURE — 64447 NJX AA&/STRD FEMORAL NRV IMG: CPT | Mod: 59,LT | Performed by: STUDENT IN AN ORGANIZED HEALTH CARE EDUCATION/TRAINING PROGRAM

## 2021-05-24 PROCEDURE — 27201423 OPTIME MED/SURG SUP & DEVICES STERILE SUPPLY: Performed by: ORTHOPAEDIC SURGERY

## 2021-05-24 PROCEDURE — 20985 CPTR-ASST DIR MS PX: CPT | Mod: ,,, | Performed by: ORTHOPAEDIC SURGERY

## 2021-05-24 PROCEDURE — 27447 TOTAL KNEE ARTHROPLASTY: CPT | Mod: 58,LT,, | Performed by: ORTHOPAEDIC SURGERY

## 2021-05-24 PROCEDURE — 37000008 HC ANESTHESIA 1ST 15 MINUTES: Performed by: ORTHOPAEDIC SURGERY

## 2021-05-24 PROCEDURE — 63600175 PHARM REV CODE 636 W HCPCS: Performed by: ANESTHESIOLOGY

## 2021-05-24 PROCEDURE — 25000003 PHARM REV CODE 250: Performed by: ORTHOPAEDIC SURGERY

## 2021-05-24 PROCEDURE — 37000009 HC ANESTHESIA EA ADD 15 MINS: Performed by: ORTHOPAEDIC SURGERY

## 2021-05-24 PROCEDURE — 71000015 HC POSTOP RECOV 1ST HR: Performed by: ORTHOPAEDIC SURGERY

## 2021-05-24 PROCEDURE — 63600175 PHARM REV CODE 636 W HCPCS: Performed by: NURSE ANESTHETIST, CERTIFIED REGISTERED

## 2021-05-24 PROCEDURE — 36000710: Performed by: ORTHOPAEDIC SURGERY

## 2021-05-24 DEVICE — CEMENT BONE ANTIBIO SIMPLEX P: Type: IMPLANTABLE DEVICE | Site: KNEE | Status: FUNCTIONAL

## 2021-05-24 DEVICE — IMPLANTABLE DEVICE: Type: IMPLANTABLE DEVICE | Site: KNEE | Status: FUNCTIONAL

## 2021-05-24 RX ORDER — CEPHALEXIN 500 MG/1
500 CAPSULE ORAL EVERY 6 HOURS
Qty: 4 CAPSULE | Refills: 0 | Status: SHIPPED | OUTPATIENT
Start: 2021-05-24 | End: 2021-05-25

## 2021-05-24 RX ORDER — BUPIVACAINE HYDROCHLORIDE 2.5 MG/ML
INJECTION, SOLUTION EPIDURAL; INFILTRATION; INTRACAUDAL
Status: DISCONTINUED | OUTPATIENT
Start: 2021-05-24 | End: 2021-05-24

## 2021-05-24 RX ORDER — CELECOXIB 100 MG/1
400 CAPSULE ORAL ONCE
Status: COMPLETED | OUTPATIENT
Start: 2021-05-24 | End: 2021-05-24

## 2021-05-24 RX ORDER — BUPIVACAINE HYDROCHLORIDE 2.5 MG/ML
INJECTION, SOLUTION INFILTRATION; PERINEURAL
Status: DISCONTINUED | OUTPATIENT
Start: 2021-05-24 | End: 2021-05-24 | Stop reason: HOSPADM

## 2021-05-24 RX ORDER — PHENYLEPHRINE HYDROCHLORIDE 10 MG/ML
INJECTION INTRAVENOUS
Status: DISCONTINUED | OUTPATIENT
Start: 2021-05-24 | End: 2021-05-24

## 2021-05-24 RX ORDER — BUPIVACAINE HYDROCHLORIDE 7.5 MG/ML
INJECTION, SOLUTION INTRASPINAL
Status: DISCONTINUED | OUTPATIENT
Start: 2021-05-24 | End: 2021-05-24

## 2021-05-24 RX ORDER — DEXAMETHASONE SODIUM PHOSPHATE 100 MG/10ML
10 INJECTION INTRAMUSCULAR; INTRAVENOUS ONCE
Status: COMPLETED | OUTPATIENT
Start: 2021-05-24 | End: 2021-05-24

## 2021-05-24 RX ORDER — PREGABALIN 75 MG/1
150 CAPSULE ORAL ONCE
Status: COMPLETED | OUTPATIENT
Start: 2021-05-24 | End: 2021-05-24

## 2021-05-24 RX ORDER — MIDAZOLAM HYDROCHLORIDE 1 MG/ML
INJECTION, SOLUTION INTRAMUSCULAR; INTRAVENOUS
Status: DISCONTINUED | OUTPATIENT
Start: 2021-05-24 | End: 2021-05-24

## 2021-05-24 RX ORDER — LIDOCAINE HYDROCHLORIDE 10 MG/ML
INJECTION, SOLUTION INTRAVENOUS
Status: DISCONTINUED | OUTPATIENT
Start: 2021-05-24 | End: 2021-05-24

## 2021-05-24 RX ORDER — DICLOFENAC SODIUM 75 MG/1
75 TABLET, DELAYED RELEASE ORAL 2 TIMES DAILY
Qty: 84 TABLET | Refills: 0 | Status: SHIPPED | OUTPATIENT
Start: 2021-05-24 | End: 2021-07-05

## 2021-05-24 RX ORDER — ACETAMINOPHEN 325 MG/1
325 TABLET ORAL EVERY 4 HOURS
Qty: 84 TABLET | Refills: 0 | Status: SHIPPED | OUTPATIENT
Start: 2021-05-24 | End: 2021-06-07

## 2021-05-24 RX ORDER — TRANEXAMIC ACID 100 MG/ML
1000 INJECTION, SOLUTION INTRAVENOUS ONCE
Status: COMPLETED | OUTPATIENT
Start: 2021-05-24 | End: 2021-05-24

## 2021-05-24 RX ORDER — ACETAMINOPHEN 500 MG
1000 TABLET ORAL EVERY 6 HOURS PRN
Status: ON HOLD | COMMUNITY
End: 2021-05-24 | Stop reason: HOSPADM

## 2021-05-24 RX ORDER — HYDROMORPHONE HYDROCHLORIDE 2 MG/ML
0.5 INJECTION, SOLUTION INTRAMUSCULAR; INTRAVENOUS; SUBCUTANEOUS EVERY 5 MIN PRN
Status: DISCONTINUED | OUTPATIENT
Start: 2021-05-24 | End: 2021-05-24 | Stop reason: HOSPADM

## 2021-05-24 RX ORDER — SODIUM CHLORIDE 0.9 % (FLUSH) 0.9 %
3 SYRINGE (ML) INJECTION
Status: DISCONTINUED | OUTPATIENT
Start: 2021-05-24 | End: 2021-05-24 | Stop reason: HOSPADM

## 2021-05-24 RX ORDER — FAMOTIDINE 20 MG/1
20 TABLET, FILM COATED ORAL 2 TIMES DAILY PRN
Qty: 84 TABLET | Refills: 0 | Status: SHIPPED | OUTPATIENT
Start: 2021-05-24 | End: 2021-10-21

## 2021-05-24 RX ORDER — SODIUM CHLORIDE, SODIUM LACTATE, POTASSIUM CHLORIDE, CALCIUM CHLORIDE 600; 310; 30; 20 MG/100ML; MG/100ML; MG/100ML; MG/100ML
INJECTION, SOLUTION INTRAVENOUS CONTINUOUS
Status: DISCONTINUED | OUTPATIENT
Start: 2021-05-24 | End: 2021-05-24 | Stop reason: HOSPADM

## 2021-05-24 RX ORDER — DIPHENHYDRAMINE HYDROCHLORIDE 50 MG/ML
12.5 INJECTION INTRAMUSCULAR; INTRAVENOUS EVERY 6 HOURS PRN
Status: DISCONTINUED | OUTPATIENT
Start: 2021-05-24 | End: 2021-05-24 | Stop reason: HOSPADM

## 2021-05-24 RX ORDER — POVIDONE-IODINE 10 %
SOLUTION, NON-ORAL TOPICAL
Status: DISCONTINUED | OUTPATIENT
Start: 2021-05-24 | End: 2021-05-24 | Stop reason: HOSPADM

## 2021-05-24 RX ORDER — TRANEXAMIC ACID 650 MG/1
1950 TABLET ORAL ONCE
Status: COMPLETED | OUTPATIENT
Start: 2021-05-24 | End: 2021-05-24

## 2021-05-24 RX ORDER — ASPIRIN 81 MG/1
81 TABLET ORAL 2 TIMES DAILY
Qty: 84 TABLET | Refills: 0 | Status: SHIPPED | OUTPATIENT
Start: 2021-05-24 | End: 2021-10-21

## 2021-05-24 RX ORDER — CEFAZOLIN SODIUM 2 G/50ML
2 SOLUTION INTRAVENOUS ONCE
Status: COMPLETED | OUTPATIENT
Start: 2021-05-24 | End: 2021-05-24

## 2021-05-24 RX ORDER — PROPOFOL 10 MG/ML
VIAL (ML) INTRAVENOUS
Status: DISCONTINUED | OUTPATIENT
Start: 2021-05-24 | End: 2021-05-24

## 2021-05-24 RX ORDER — PREGABALIN 100 MG/1
100 CAPSULE ORAL DAILY
COMMUNITY
End: 2021-05-25

## 2021-05-24 RX ORDER — TRANEXAMIC ACID 100 MG/ML
INJECTION, SOLUTION INTRAVENOUS
Status: DISCONTINUED | OUTPATIENT
Start: 2021-05-24 | End: 2021-05-24 | Stop reason: HOSPADM

## 2021-05-24 RX ORDER — OXYCODONE AND ACETAMINOPHEN 5; 325 MG/1; MG/1
1 TABLET ORAL ONCE
Status: COMPLETED | OUTPATIENT
Start: 2021-05-24 | End: 2021-05-24

## 2021-05-24 RX ORDER — LIDOCAINE HYDROCHLORIDE 10 MG/ML
1 INJECTION, SOLUTION EPIDURAL; INFILTRATION; INTRACAUDAL; PERINEURAL ONCE
Status: DISCONTINUED | OUTPATIENT
Start: 2021-05-24 | End: 2021-05-24 | Stop reason: HOSPADM

## 2021-05-24 RX ORDER — ACETAMINOPHEN 500 MG
1000 TABLET ORAL ONCE
Status: COMPLETED | OUTPATIENT
Start: 2021-05-24 | End: 2021-05-24

## 2021-05-24 RX ORDER — ONDANSETRON 2 MG/ML
4 INJECTION INTRAMUSCULAR; INTRAVENOUS DAILY PRN
Status: DISCONTINUED | OUTPATIENT
Start: 2021-05-24 | End: 2021-05-24 | Stop reason: HOSPADM

## 2021-05-24 RX ADMIN — HYDROMORPHONE HYDROCHLORIDE 0.5 MG: 2 INJECTION, SOLUTION INTRAMUSCULAR; INTRAVENOUS; SUBCUTANEOUS at 01:05

## 2021-05-24 RX ADMIN — PHENYLEPHRINE HYDROCHLORIDE 100 MCG: 10 INJECTION INTRAVENOUS at 12:05

## 2021-05-24 RX ADMIN — PHENYLEPHRINE HYDROCHLORIDE 100 MCG: 10 INJECTION INTRAVENOUS at 11:05

## 2021-05-24 RX ADMIN — CEFAZOLIN SODIUM 2 G: 2 SOLUTION INTRAVENOUS at 10:05

## 2021-05-24 RX ADMIN — MIDAZOLAM 2 MG: 1 INJECTION INTRAMUSCULAR; INTRAVENOUS at 10:05

## 2021-05-24 RX ADMIN — ACETAMINOPHEN 1000 MG: 500 TABLET ORAL at 08:05

## 2021-05-24 RX ADMIN — LORAZEPAM 1 MG: 2 INJECTION INTRAMUSCULAR; INTRAVENOUS at 01:05

## 2021-05-24 RX ADMIN — TRANEXAMIC ACID 1950 MG: 650 TABLET, FILM COATED ORAL at 08:05

## 2021-05-24 RX ADMIN — BUPIVACAINE HYDROCHLORIDE 1.6 ML: 7.5 INJECTION, SOLUTION SUBARACHNOID at 10:05

## 2021-05-24 RX ADMIN — BUPIVACAINE HYDROCHLORIDE 20 ML: 2.5 INJECTION, SOLUTION EPIDURAL; INFILTRATION; INTRACAUDAL; PERINEURAL at 01:05

## 2021-05-24 RX ADMIN — PROPOFOL 75 MCG/KG/MIN: 10 INJECTION, EMULSION INTRAVENOUS at 10:05

## 2021-05-24 RX ADMIN — OXYCODONE HYDROCHLORIDE AND ACETAMINOPHEN 1 TABLET: 5; 325 TABLET ORAL at 04:05

## 2021-05-24 RX ADMIN — PHENYLEPHRINE HYDROCHLORIDE 200 MCG: 10 INJECTION INTRAVENOUS at 11:05

## 2021-05-24 RX ADMIN — PROPOFOL 125 MCG/KG/MIN: 10 INJECTION, EMULSION INTRAVENOUS at 10:05

## 2021-05-24 RX ADMIN — PREGABALIN 150 MG: 75 CAPSULE ORAL at 08:05

## 2021-05-24 RX ADMIN — SODIUM CHLORIDE, SODIUM LACTATE, POTASSIUM CHLORIDE, AND CALCIUM CHLORIDE: .6; .31; .03; .02 INJECTION, SOLUTION INTRAVENOUS at 09:05

## 2021-05-24 RX ADMIN — HYDROMORPHONE HYDROCHLORIDE 0.5 MG: 2 INJECTION, SOLUTION INTRAMUSCULAR; INTRAVENOUS; SUBCUTANEOUS at 12:05

## 2021-05-24 RX ADMIN — LIDOCAINE HYDROCHLORIDE 60 MG: 10 INJECTION, SOLUTION INTRAVENOUS at 10:05

## 2021-05-24 RX ADMIN — BUPIVACAINE 10 ML: 13.3 INJECTION, SUSPENSION, LIPOSOMAL INFILTRATION at 01:05

## 2021-05-24 RX ADMIN — TRANEXAMIC ACID 1000 MG: 100 INJECTION, SOLUTION INTRAVENOUS at 12:05

## 2021-05-24 RX ADMIN — PROPOFOL 30 MG: 10 INJECTION, EMULSION INTRAVENOUS at 10:05

## 2021-05-24 RX ADMIN — CELECOXIB 400 MG: 100 CAPSULE ORAL at 08:05

## 2021-05-24 RX ADMIN — DEXAMETHASONE SODIUM PHOSPHATE 10 MG: 10 INJECTION, SOLUTION INTRAMUSCULAR; INTRAVENOUS at 10:05

## 2021-05-25 ENCOUNTER — PATIENT MESSAGE (OUTPATIENT)
Dept: ORTHOPEDICS | Facility: CLINIC | Age: 41
End: 2021-05-25

## 2021-05-25 DIAGNOSIS — R33.8 ACUTE URINARY RETENTION: Primary | ICD-10-CM

## 2021-05-25 PROBLEM — Z74.09 IMPAIRED FUNCTIONAL MOBILITY, BALANCE, GAIT, AND ENDURANCE: Status: ACTIVE | Noted: 2021-05-25

## 2021-05-25 RX ORDER — PREGABALIN 75 MG/1
75 CAPSULE ORAL 2 TIMES DAILY
Qty: 28 CAPSULE | Refills: 0 | Status: SHIPPED | OUTPATIENT
Start: 2021-05-25 | End: 2021-10-21

## 2021-05-31 ENCOUNTER — PATIENT MESSAGE (OUTPATIENT)
Dept: ORTHOPEDICS | Facility: CLINIC | Age: 41
End: 2021-05-31

## 2021-06-10 ENCOUNTER — OFFICE VISIT (OUTPATIENT)
Dept: ORTHOPEDICS | Facility: CLINIC | Age: 41
End: 2021-06-10
Payer: COMMERCIAL

## 2021-06-10 VITALS
SYSTOLIC BLOOD PRESSURE: 121 MMHG | BODY MASS INDEX: 37.56 KG/M2 | WEIGHT: 220 LBS | HEART RATE: 110 BPM | HEIGHT: 64 IN | DIASTOLIC BLOOD PRESSURE: 72 MMHG

## 2021-06-10 DIAGNOSIS — Z96.652 AFTERCARE FOLLOWING LEFT KNEE JOINT REPLACEMENT SURGERY: Primary | ICD-10-CM

## 2021-06-10 DIAGNOSIS — Z47.1 AFTERCARE FOLLOWING LEFT KNEE JOINT REPLACEMENT SURGERY: Primary | ICD-10-CM

## 2021-06-10 DIAGNOSIS — M17.11 PRIMARY OSTEOARTHRITIS OF RIGHT KNEE: ICD-10-CM

## 2021-06-10 PROCEDURE — 99999 PR PBB SHADOW E&M-EST. PATIENT-LVL IV: ICD-10-PCS | Mod: PBBFAC,,, | Performed by: ORTHOPAEDIC SURGERY

## 2021-06-10 PROCEDURE — 3008F PR BODY MASS INDEX (BMI) DOCUMENTED: ICD-10-PCS | Mod: CPTII,S$GLB,, | Performed by: ORTHOPAEDIC SURGERY

## 2021-06-10 PROCEDURE — 99024 POSTOP FOLLOW-UP VISIT: CPT | Mod: S$GLB,,, | Performed by: ORTHOPAEDIC SURGERY

## 2021-06-10 PROCEDURE — 3008F BODY MASS INDEX DOCD: CPT | Mod: CPTII,S$GLB,, | Performed by: ORTHOPAEDIC SURGERY

## 2021-06-10 PROCEDURE — 1125F AMNT PAIN NOTED PAIN PRSNT: CPT | Mod: S$GLB,,, | Performed by: ORTHOPAEDIC SURGERY

## 2021-06-10 PROCEDURE — 1125F PR PAIN SEVERITY QUANTIFIED, PAIN PRESENT: ICD-10-PCS | Mod: S$GLB,,, | Performed by: ORTHOPAEDIC SURGERY

## 2021-06-10 PROCEDURE — 99024 PR POST-OP FOLLOW-UP VISIT: ICD-10-PCS | Mod: S$GLB,,, | Performed by: ORTHOPAEDIC SURGERY

## 2021-06-10 PROCEDURE — 99999 PR PBB SHADOW E&M-EST. PATIENT-LVL IV: CPT | Mod: PBBFAC,,, | Performed by: ORTHOPAEDIC SURGERY

## 2021-07-07 ENCOUNTER — PATIENT MESSAGE (OUTPATIENT)
Dept: ORTHOPEDICS | Facility: CLINIC | Age: 41
End: 2021-07-07

## 2021-07-08 ENCOUNTER — OFFICE VISIT (OUTPATIENT)
Dept: ORTHOPEDICS | Facility: CLINIC | Age: 41
End: 2021-07-08
Payer: COMMERCIAL

## 2021-07-08 VITALS
DIASTOLIC BLOOD PRESSURE: 85 MMHG | BODY MASS INDEX: 37.3 KG/M2 | HEART RATE: 108 BPM | HEIGHT: 64 IN | WEIGHT: 218.5 LBS | SYSTOLIC BLOOD PRESSURE: 117 MMHG

## 2021-07-08 DIAGNOSIS — M54.10 RADICULAR PAIN OF LEFT LOWER EXTREMITY: Primary | ICD-10-CM

## 2021-07-08 PROCEDURE — 1125F PR PAIN SEVERITY QUANTIFIED, PAIN PRESENT: ICD-10-PCS | Mod: S$GLB,,, | Performed by: ORTHOPAEDIC SURGERY

## 2021-07-08 PROCEDURE — 99213 OFFICE O/P EST LOW 20 MIN: CPT | Mod: 24,S$GLB,, | Performed by: ORTHOPAEDIC SURGERY

## 2021-07-08 PROCEDURE — 99999 PR PBB SHADOW E&M-EST. PATIENT-LVL III: ICD-10-PCS | Mod: PBBFAC,,, | Performed by: ORTHOPAEDIC SURGERY

## 2021-07-08 PROCEDURE — 99213 PR OFFICE/OUTPT VISIT, EST, LEVL III, 20-29 MIN: ICD-10-PCS | Mod: 24,S$GLB,, | Performed by: ORTHOPAEDIC SURGERY

## 2021-07-08 PROCEDURE — 99999 PR PBB SHADOW E&M-EST. PATIENT-LVL III: CPT | Mod: PBBFAC,,, | Performed by: ORTHOPAEDIC SURGERY

## 2021-07-08 PROCEDURE — 3008F BODY MASS INDEX DOCD: CPT | Mod: CPTII,S$GLB,, | Performed by: ORTHOPAEDIC SURGERY

## 2021-07-08 PROCEDURE — 1125F AMNT PAIN NOTED PAIN PRSNT: CPT | Mod: S$GLB,,, | Performed by: ORTHOPAEDIC SURGERY

## 2021-07-08 PROCEDURE — 3008F PR BODY MASS INDEX (BMI) DOCUMENTED: ICD-10-PCS | Mod: CPTII,S$GLB,, | Performed by: ORTHOPAEDIC SURGERY

## 2021-07-08 RX ORDER — DICLOFENAC SODIUM 75 MG/1
75 TABLET, DELAYED RELEASE ORAL 2 TIMES DAILY
Qty: 28 TABLET | Refills: 0 | Status: SHIPPED | OUTPATIENT
Start: 2021-07-08 | End: 2021-07-22

## 2021-07-25 ENCOUNTER — PATIENT MESSAGE (OUTPATIENT)
Dept: ORTHOPEDICS | Facility: CLINIC | Age: 41
End: 2021-07-25

## 2021-07-25 DIAGNOSIS — M54.10 RADICULAR PAIN OF LEFT LOWER EXTREMITY: Primary | ICD-10-CM

## 2021-07-27 ENCOUNTER — TELEPHONE (OUTPATIENT)
Dept: ADMINISTRATIVE | Facility: OTHER | Age: 41
End: 2021-07-27

## 2021-07-29 ENCOUNTER — TELEPHONE (OUTPATIENT)
Dept: ADMINISTRATIVE | Facility: OTHER | Age: 41
End: 2021-07-29

## 2021-08-19 ENCOUNTER — PATIENT MESSAGE (OUTPATIENT)
Dept: UROLOGY | Facility: CLINIC | Age: 41
End: 2021-08-19

## 2021-08-20 DIAGNOSIS — N30.00 ACUTE CYSTITIS WITHOUT HEMATURIA: Primary | ICD-10-CM

## 2021-08-21 RX ORDER — CIPROFLOXACIN 500 MG/1
500 TABLET ORAL 2 TIMES DAILY
Qty: 10 TABLET | Refills: 0 | Status: SHIPPED | OUTPATIENT
Start: 2021-08-21 | End: 2021-08-26

## 2021-08-23 ENCOUNTER — TELEPHONE (OUTPATIENT)
Dept: UROLOGY | Facility: CLINIC | Age: 41
End: 2021-08-23

## 2021-09-15 ENCOUNTER — HOSPITAL ENCOUNTER (EMERGENCY)
Facility: HOSPITAL | Age: 41
Discharge: HOME | End: 2021-09-15
Attending: EMERGENCY MEDICINE
Payer: COMMERCIAL

## 2021-09-15 VITALS
SYSTOLIC BLOOD PRESSURE: 123 MMHG | HEIGHT: 64 IN | WEIGHT: 200 LBS | TEMPERATURE: 98 F | OXYGEN SATURATION: 98 % | DIASTOLIC BLOOD PRESSURE: 80 MMHG | HEART RATE: 101 BPM | RESPIRATION RATE: 16 BRPM | BODY MASS INDEX: 34.15 KG/M2

## 2021-09-15 DIAGNOSIS — N30.90 CYSTITIS: ICD-10-CM

## 2021-09-15 DIAGNOSIS — R10.9 LEFT FLANK PAIN: Primary | ICD-10-CM

## 2021-09-15 LAB
ALBUMIN SERPL BCP-MCNC: 3.9 G/DL (ref 3.5–5.2)
ALP SERPL-CCNC: 95 U/L (ref 38–126)
ALT SERPL W/O P-5'-P-CCNC: 13 U/L (ref 10–44)
ANION GAP SERPL CALC-SCNC: 8 MMOL/L (ref 8–16)
AST SERPL-CCNC: 26 U/L (ref 15–46)
B-HCG UR QL: NEGATIVE
BACTERIA #/AREA URNS AUTO: ABNORMAL /HPF
BASOPHILS # BLD AUTO: 0.07 K/UL (ref 0–0.2)
BASOPHILS NFR BLD: 0.7 % (ref 0–1.9)
BILIRUB SERPL-MCNC: 0.3 MG/DL (ref 0.1–1)
BILIRUB UR QL STRIP: NEGATIVE
CALCIUM SERPL-MCNC: 9.4 MG/DL (ref 8.7–10.5)
CHLORIDE SERPL-SCNC: 106 MMOL/L (ref 95–110)
CLARITY UR REFRACT.AUTO: CLEAR
CO2 SERPL-SCNC: 28 MMOL/L (ref 23–29)
COLOR UR AUTO: YELLOW
CREAT SERPL-MCNC: 0.82 MG/DL (ref 0.5–1.4)
DIFFERENTIAL METHOD: ABNORMAL
EOSINOPHIL # BLD AUTO: 0.2 K/UL (ref 0–0.5)
EOSINOPHIL NFR BLD: 1.6 % (ref 0–8)
ERYTHROCYTE [DISTWIDTH] IN BLOOD BY AUTOMATED COUNT: 13.8 % (ref 11.5–14.5)
EST. GFR  (AFRICAN AMERICAN): >60 ML/MIN/1.73 M^2
EST. GFR  (NON AFRICAN AMERICAN): >60 ML/MIN/1.73 M^2
GLUCOSE SERPL-MCNC: 120 MG/DL (ref 70–110)
GLUCOSE UR QL STRIP: NEGATIVE
HCT VFR BLD AUTO: 41.1 % (ref 37–48.5)
HGB BLD-MCNC: 13 G/DL (ref 12–16)
HGB UR QL STRIP: ABNORMAL
IMM GRANULOCYTES # BLD AUTO: 0.04 K/UL (ref 0–0.04)
IMM GRANULOCYTES NFR BLD AUTO: 0.4 % (ref 0–0.5)
KETONES UR QL STRIP: NEGATIVE
LEUKOCYTE ESTERASE UR QL STRIP: ABNORMAL
LYMPHOCYTES # BLD AUTO: 2.5 K/UL (ref 1–4.8)
LYMPHOCYTES NFR BLD: 25.5 % (ref 18–48)
MCH RBC QN AUTO: 27.4 PG (ref 27–31)
MCHC RBC AUTO-ENTMCNC: 31.6 G/DL (ref 32–36)
MCV RBC AUTO: 87 FL (ref 82–98)
MICROSCOPIC COMMENT: ABNORMAL
MONOCYTES # BLD AUTO: 1 K/UL (ref 0.3–1)
MONOCYTES NFR BLD: 10.8 % (ref 4–15)
NEUTROPHILS # BLD AUTO: 5.9 K/UL (ref 1.8–7.7)
NEUTROPHILS NFR BLD: 61 % (ref 38–73)
NITRITE UR QL STRIP: POSITIVE
NRBC BLD-RTO: 0 /100 WBC
PH UR STRIP: 5 [PH] (ref 5–8)
PLATELET # BLD AUTO: 232 K/UL (ref 150–450)
PMV BLD AUTO: 12.2 FL (ref 9.2–12.9)
POTASSIUM SERPL-SCNC: 4.5 MMOL/L (ref 3.5–5.1)
PROT SERPL-MCNC: 7 G/DL (ref 6–8.4)
PROT UR QL STRIP: ABNORMAL
RBC # BLD AUTO: 4.75 M/UL (ref 4–5.4)
RBC #/AREA URNS AUTO: 3 /HPF (ref 0–4)
SODIUM SERPL-SCNC: 142 MMOL/L (ref 136–145)
SP GR UR STRIP: 1.02 (ref 1–1.03)
URN SPEC COLLECT METH UR: ABNORMAL
UROBILINOGEN UR STRIP-ACNC: NEGATIVE EU/DL
UUN UR-MCNC: 15 MG/DL (ref 7–17)
WBC # BLD AUTO: 9.6 K/UL (ref 3.9–12.7)
WBC #/AREA URNS AUTO: 20 /HPF (ref 0–5)

## 2021-09-15 PROCEDURE — 81025 URINE PREGNANCY TEST: CPT | Mod: ER | Performed by: PHYSICIAN ASSISTANT

## 2021-09-15 PROCEDURE — 87077 CULTURE AEROBIC IDENTIFY: CPT | Mod: ER | Performed by: PHYSICIAN ASSISTANT

## 2021-09-15 PROCEDURE — 81000 URINALYSIS NONAUTO W/SCOPE: CPT | Mod: ER | Performed by: PHYSICIAN ASSISTANT

## 2021-09-15 PROCEDURE — 96361 HYDRATE IV INFUSION ADD-ON: CPT | Mod: ER

## 2021-09-15 PROCEDURE — 63600175 PHARM REV CODE 636 W HCPCS: Mod: ER | Performed by: PHYSICIAN ASSISTANT

## 2021-09-15 PROCEDURE — 96365 THER/PROPH/DIAG IV INF INIT: CPT | Mod: ER

## 2021-09-15 PROCEDURE — 99284 EMERGENCY DEPT VISIT MOD MDM: CPT | Mod: 25,ER

## 2021-09-15 PROCEDURE — 87088 URINE BACTERIA CULTURE: CPT | Mod: ER | Performed by: PHYSICIAN ASSISTANT

## 2021-09-15 PROCEDURE — 85025 COMPLETE CBC W/AUTO DIFF WBC: CPT | Mod: ER | Performed by: PHYSICIAN ASSISTANT

## 2021-09-15 PROCEDURE — 96375 TX/PRO/DX INJ NEW DRUG ADDON: CPT | Mod: ER

## 2021-09-15 PROCEDURE — 87186 SC STD MICRODIL/AGAR DIL: CPT | Mod: ER | Performed by: PHYSICIAN ASSISTANT

## 2021-09-15 PROCEDURE — 87086 URINE CULTURE/COLONY COUNT: CPT | Mod: ER | Performed by: PHYSICIAN ASSISTANT

## 2021-09-15 PROCEDURE — 96367 TX/PROPH/DG ADDL SEQ IV INF: CPT | Mod: ER

## 2021-09-15 PROCEDURE — 80053 COMPREHEN METABOLIC PANEL: CPT | Mod: ER | Performed by: PHYSICIAN ASSISTANT

## 2021-09-15 PROCEDURE — 25000003 PHARM REV CODE 250: Mod: ER | Performed by: PHYSICIAN ASSISTANT

## 2021-09-15 RX ORDER — KETOROLAC TROMETHAMINE 30 MG/ML
15 INJECTION, SOLUTION INTRAMUSCULAR; INTRAVENOUS
Status: COMPLETED | OUTPATIENT
Start: 2021-09-15 | End: 2021-09-15

## 2021-09-15 RX ORDER — MORPHINE SULFATE 4 MG/ML
4 INJECTION, SOLUTION INTRAMUSCULAR; INTRAVENOUS
Status: COMPLETED | OUTPATIENT
Start: 2021-09-15 | End: 2021-09-15

## 2021-09-15 RX ORDER — KETOROLAC TROMETHAMINE 10 MG/1
10 TABLET, FILM COATED ORAL EVERY 6 HOURS
Qty: 12 TABLET | Refills: 0 | Status: SHIPPED | OUTPATIENT
Start: 2021-09-15 | End: 2021-09-18

## 2021-09-15 RX ORDER — TOBRAMYCIN AND DEXAMETHASONE 3; 1 MG/ML; MG/ML
SUSPENSION/ DROPS OPHTHALMIC
COMMUNITY
Start: 2021-07-13 | End: 2021-10-21

## 2021-09-15 RX ORDER — PROMETHAZINE HYDROCHLORIDE 25 MG/1
25 TABLET ORAL EVERY 6 HOURS PRN
Qty: 15 TABLET | Refills: 0 | Status: SHIPPED | OUTPATIENT
Start: 2021-09-15 | End: 2021-10-21

## 2021-09-15 RX ORDER — CEPHALEXIN 500 MG/1
500 CAPSULE ORAL 4 TIMES DAILY
Qty: 28 CAPSULE | Refills: 0 | Status: SHIPPED | OUTPATIENT
Start: 2021-09-15 | End: 2021-09-22

## 2021-09-15 RX ADMIN — CEFTRIAXONE 1 G: 1 INJECTION, SOLUTION INTRAVENOUS at 04:09

## 2021-09-15 RX ADMIN — KETOROLAC TROMETHAMINE 15 MG: 30 INJECTION, SOLUTION INTRAMUSCULAR at 03:09

## 2021-09-15 RX ADMIN — PROMETHAZINE HYDROCHLORIDE 25 MG: 25 INJECTION INTRAMUSCULAR; INTRAVENOUS at 03:09

## 2021-09-15 RX ADMIN — SODIUM CHLORIDE 1000 ML: 0.9 INJECTION, SOLUTION INTRAVENOUS at 03:09

## 2021-09-15 RX ADMIN — MORPHINE SULFATE 4 MG: 4 INJECTION, SOLUTION INTRAMUSCULAR; INTRAVENOUS at 04:09

## 2021-09-16 ENCOUNTER — PATIENT MESSAGE (OUTPATIENT)
Dept: UROLOGY | Facility: CLINIC | Age: 41
End: 2021-09-16

## 2021-09-17 RX ORDER — HYDROCODONE BITARTRATE AND ACETAMINOPHEN 10; 325 MG/1; MG/1
1 TABLET ORAL EVERY 6 HOURS PRN
Qty: 28 TABLET | Refills: 0 | Status: SHIPPED | OUTPATIENT
Start: 2021-09-17 | End: 2021-10-21

## 2021-09-17 RX ORDER — HYDROCODONE BITARTRATE AND ACETAMINOPHEN 10; 325 MG/1; MG/1
1 TABLET ORAL EVERY 6 HOURS PRN
Qty: 28 TABLET | Refills: 0 | Status: SHIPPED | OUTPATIENT
Start: 2021-09-17 | End: 2021-09-17 | Stop reason: SDUPTHER

## 2021-09-18 LAB — BACTERIA UR CULT: ABNORMAL

## 2021-10-21 ENCOUNTER — OFFICE VISIT (OUTPATIENT)
Dept: FAMILY MEDICINE | Facility: CLINIC | Age: 41
End: 2021-10-21
Payer: COMMERCIAL

## 2021-10-21 VITALS
OXYGEN SATURATION: 98 % | DIASTOLIC BLOOD PRESSURE: 80 MMHG | BODY MASS INDEX: 37.67 KG/M2 | TEMPERATURE: 99 F | HEART RATE: 95 BPM | SYSTOLIC BLOOD PRESSURE: 122 MMHG | WEIGHT: 219.44 LBS

## 2021-10-21 DIAGNOSIS — F32.A ANXIETY AND DEPRESSION: Primary | ICD-10-CM

## 2021-10-21 DIAGNOSIS — R41.840 LACK OF CONCENTRATION: ICD-10-CM

## 2021-10-21 DIAGNOSIS — F41.9 ANXIETY AND DEPRESSION: Primary | ICD-10-CM

## 2021-10-21 DIAGNOSIS — F51.01 PRIMARY INSOMNIA: ICD-10-CM

## 2021-10-21 DIAGNOSIS — Z00.00 ANNUAL PHYSICAL EXAM: ICD-10-CM

## 2021-10-21 DIAGNOSIS — Z23 NEED FOR INFLUENZA VACCINATION: ICD-10-CM

## 2021-10-21 PROBLEM — M54.10 RADICULAR PAIN OF LEFT LOWER EXTREMITY: Status: RESOLVED | Noted: 2021-07-08 | Resolved: 2021-10-21

## 2021-10-21 PROBLEM — Z47.1 AFTERCARE FOLLOWING LEFT KNEE JOINT REPLACEMENT SURGERY: Status: RESOLVED | Noted: 2021-06-10 | Resolved: 2021-10-21

## 2021-10-21 PROBLEM — M25.562 ACUTE PAIN OF LEFT KNEE: Status: RESOLVED | Noted: 2021-03-03 | Resolved: 2021-10-21

## 2021-10-21 PROBLEM — Z74.09 IMPAIRED FUNCTIONAL MOBILITY, BALANCE, GAIT, AND ENDURANCE: Status: RESOLVED | Noted: 2021-05-25 | Resolved: 2021-10-21

## 2021-10-21 PROBLEM — Z96.652 AFTERCARE FOLLOWING LEFT KNEE JOINT REPLACEMENT SURGERY: Status: RESOLVED | Noted: 2021-06-10 | Resolved: 2021-10-21

## 2021-10-21 PROBLEM — M17.12 OSTEOARTHRITIS OF LEFT KNEE: Status: RESOLVED | Noted: 2021-05-24 | Resolved: 2021-10-21

## 2021-10-21 PROCEDURE — 1159F PR MEDICATION LIST DOCUMENTED IN MEDICAL RECORD: ICD-10-PCS | Mod: CPTII,S$GLB,, | Performed by: INTERNAL MEDICINE

## 2021-10-21 PROCEDURE — 3008F PR BODY MASS INDEX (BMI) DOCUMENTED: ICD-10-PCS | Mod: CPTII,S$GLB,, | Performed by: INTERNAL MEDICINE

## 2021-10-21 PROCEDURE — 90471 IMMUNIZATION ADMIN: CPT | Mod: S$GLB,,, | Performed by: INTERNAL MEDICINE

## 2021-10-21 PROCEDURE — 3074F PR MOST RECENT SYSTOLIC BLOOD PRESSURE < 130 MM HG: ICD-10-PCS | Mod: CPTII,S$GLB,, | Performed by: INTERNAL MEDICINE

## 2021-10-21 PROCEDURE — 99999 PR PBB SHADOW E&M-EST. PATIENT-LVL III: ICD-10-PCS | Mod: PBBFAC,,, | Performed by: INTERNAL MEDICINE

## 2021-10-21 PROCEDURE — 90471 FLU VACCINE (QUAD) GREATER THAN OR EQUAL TO 3YO PRESERVATIVE FREE IM: ICD-10-PCS | Mod: S$GLB,,, | Performed by: INTERNAL MEDICINE

## 2021-10-21 PROCEDURE — 3044F HG A1C LEVEL LT 7.0%: CPT | Mod: CPTII,S$GLB,, | Performed by: INTERNAL MEDICINE

## 2021-10-21 PROCEDURE — 90686 FLU VACCINE (QUAD) GREATER THAN OR EQUAL TO 3YO PRESERVATIVE FREE IM: ICD-10-PCS | Mod: S$GLB,,, | Performed by: INTERNAL MEDICINE

## 2021-10-21 PROCEDURE — 99214 OFFICE O/P EST MOD 30 MIN: CPT | Mod: 25,S$GLB,, | Performed by: INTERNAL MEDICINE

## 2021-10-21 PROCEDURE — 3074F SYST BP LT 130 MM HG: CPT | Mod: CPTII,S$GLB,, | Performed by: INTERNAL MEDICINE

## 2021-10-21 PROCEDURE — 3079F DIAST BP 80-89 MM HG: CPT | Mod: CPTII,S$GLB,, | Performed by: INTERNAL MEDICINE

## 2021-10-21 PROCEDURE — 3044F PR MOST RECENT HEMOGLOBIN A1C LEVEL <7.0%: ICD-10-PCS | Mod: CPTII,S$GLB,, | Performed by: INTERNAL MEDICINE

## 2021-10-21 PROCEDURE — 99999 PR PBB SHADOW E&M-EST. PATIENT-LVL III: CPT | Mod: PBBFAC,,, | Performed by: INTERNAL MEDICINE

## 2021-10-21 PROCEDURE — 3008F BODY MASS INDEX DOCD: CPT | Mod: CPTII,S$GLB,, | Performed by: INTERNAL MEDICINE

## 2021-10-21 PROCEDURE — 1159F MED LIST DOCD IN RCRD: CPT | Mod: CPTII,S$GLB,, | Performed by: INTERNAL MEDICINE

## 2021-10-21 PROCEDURE — 99214 PR OFFICE/OUTPT VISIT, EST, LEVL IV, 30-39 MIN: ICD-10-PCS | Mod: 25,S$GLB,, | Performed by: INTERNAL MEDICINE

## 2021-10-21 PROCEDURE — 3079F PR MOST RECENT DIASTOLIC BLOOD PRESSURE 80-89 MM HG: ICD-10-PCS | Mod: CPTII,S$GLB,, | Performed by: INTERNAL MEDICINE

## 2021-10-21 PROCEDURE — 90686 IIV4 VACC NO PRSV 0.5 ML IM: CPT | Mod: S$GLB,,, | Performed by: INTERNAL MEDICINE

## 2021-10-21 RX ORDER — TRAZODONE HYDROCHLORIDE 100 MG/1
100 TABLET ORAL NIGHTLY
Qty: 90 TABLET | Refills: 3 | Status: SHIPPED | OUTPATIENT
Start: 2021-10-21 | End: 2022-10-16 | Stop reason: SDUPTHER

## 2021-10-21 RX ORDER — PREGABALIN 100 MG/1
100 CAPSULE ORAL DAILY
COMMUNITY
End: 2021-10-21 | Stop reason: SDUPTHER

## 2021-10-21 RX ORDER — PREGABALIN 100 MG/1
100 CAPSULE ORAL DAILY
Qty: 180 CAPSULE | Refills: 0 | Status: SHIPPED | OUTPATIENT
Start: 2021-10-21 | End: 2022-04-21 | Stop reason: SDUPTHER

## 2021-11-08 ENCOUNTER — PATIENT MESSAGE (OUTPATIENT)
Dept: FAMILY MEDICINE | Facility: CLINIC | Age: 41
End: 2021-11-08
Payer: COMMERCIAL

## 2021-12-01 ENCOUNTER — OFFICE VISIT (OUTPATIENT)
Dept: FAMILY MEDICINE | Facility: CLINIC | Age: 41
End: 2021-12-01
Payer: COMMERCIAL

## 2021-12-01 DIAGNOSIS — F32.A ANXIETY AND DEPRESSION: ICD-10-CM

## 2021-12-01 DIAGNOSIS — F41.9 ANXIETY AND DEPRESSION: ICD-10-CM

## 2021-12-01 DIAGNOSIS — F51.01 PRIMARY INSOMNIA: Primary | ICD-10-CM

## 2021-12-01 DIAGNOSIS — E78.2 MIXED HYPERLIPIDEMIA: ICD-10-CM

## 2021-12-01 PROCEDURE — 99214 PR OFFICE/OUTPT VISIT, EST, LEVL IV, 30-39 MIN: ICD-10-PCS | Mod: 95,,, | Performed by: INTERNAL MEDICINE

## 2021-12-01 PROCEDURE — 99214 OFFICE O/P EST MOD 30 MIN: CPT | Mod: 95,,, | Performed by: INTERNAL MEDICINE

## 2021-12-01 RX ORDER — DULOXETIN HYDROCHLORIDE 20 MG/1
20 CAPSULE, DELAYED RELEASE ORAL DAILY
Qty: 30 CAPSULE | Refills: 11 | Status: SHIPPED | OUTPATIENT
Start: 2021-12-01 | End: 2022-01-07

## 2021-12-09 ENCOUNTER — OFFICE VISIT (OUTPATIENT)
Dept: PSYCHIATRY | Facility: CLINIC | Age: 41
End: 2021-12-09
Payer: COMMERCIAL

## 2021-12-09 DIAGNOSIS — F41.9 ANXIETY AND DEPRESSION: ICD-10-CM

## 2021-12-09 DIAGNOSIS — Z13.39 ADHD (ATTENTION DEFICIT HYPERACTIVITY DISORDER) EVALUATION: Primary | ICD-10-CM

## 2021-12-09 DIAGNOSIS — F90.0 ADHD (ATTENTION DEFICIT HYPERACTIVITY DISORDER), INATTENTIVE TYPE: ICD-10-CM

## 2021-12-09 DIAGNOSIS — F32.A ANXIETY AND DEPRESSION: ICD-10-CM

## 2021-12-09 PROCEDURE — 96131 PSYCL TST EVAL PHYS/QHP EA: CPT | Mod: 95,,, | Performed by: PSYCHOLOGIST

## 2021-12-09 PROCEDURE — 96139 PR PSYCH/NEUROPSYCH TEST ADMIN/SCORING, BY TECH, 2+ TESTS, EA ADDTL 30 MIN: ICD-10-PCS | Mod: 95,,, | Performed by: PSYCHOLOGIST

## 2021-12-09 PROCEDURE — 96130 PR PSYCHOLOGIC TEST EVAL SVCS, 1ST HR: ICD-10-PCS | Mod: 95,,, | Performed by: PSYCHOLOGIST

## 2021-12-09 PROCEDURE — 96138 PSYCL/NRPSYC TECH 1ST: CPT | Mod: 95,,, | Performed by: PSYCHOLOGIST

## 2021-12-09 PROCEDURE — 96139 PSYCL/NRPSYC TST TECH EA: CPT | Mod: 95,,, | Performed by: PSYCHOLOGIST

## 2021-12-09 PROCEDURE — 96138 PR PSYCH/NEUROPSYCH TEST ADMIN/SCORING, BY TECH, 2+ TESTS, 1ST 30 MIN: ICD-10-PCS | Mod: 95,,, | Performed by: PSYCHOLOGIST

## 2021-12-09 PROCEDURE — 90791 PR PSYCHIATRIC DIAGNOSTIC EVALUATION: ICD-10-PCS | Mod: 95,,, | Performed by: PSYCHOLOGIST

## 2021-12-09 PROCEDURE — 96131 PR PSYCHOLOGIC TEST EVAL SVCS, EA ADDTL HR: ICD-10-PCS | Mod: 95,,, | Performed by: PSYCHOLOGIST

## 2021-12-09 PROCEDURE — 96130 PSYCL TST EVAL PHYS/QHP 1ST: CPT | Mod: 95,,, | Performed by: PSYCHOLOGIST

## 2021-12-09 PROCEDURE — 90791 PSYCH DIAGNOSTIC EVALUATION: CPT | Mod: 95,,, | Performed by: PSYCHOLOGIST

## 2021-12-13 ENCOUNTER — CLINICAL SUPPORT (OUTPATIENT)
Dept: OTHER | Facility: CLINIC | Age: 41
End: 2021-12-13
Payer: COMMERCIAL

## 2021-12-13 DIAGNOSIS — Z00.8 ENCOUNTER FOR OTHER GENERAL EXAMINATION: ICD-10-CM

## 2021-12-14 VITALS — BODY MASS INDEX: 37.67 KG/M2 | HEIGHT: 64 IN

## 2021-12-14 LAB
GLUCOSE SERPL-MCNC: 132 MG/DL (ref 60–140)
HDLC SERPL-MCNC: 51 MG/DL
POC CHOLESTEROL, LDL (DOCK): 154 MG/DL
POC CHOLESTEROL, TOTAL: 236 MG/DL
TRIGL SERPL-MCNC: 151 MG/DL

## 2022-01-06 ENCOUNTER — PATIENT MESSAGE (OUTPATIENT)
Dept: PSYCHIATRY | Facility: CLINIC | Age: 42
End: 2022-01-06
Payer: COMMERCIAL

## 2022-01-06 ENCOUNTER — PATIENT MESSAGE (OUTPATIENT)
Dept: FAMILY MEDICINE | Facility: CLINIC | Age: 42
End: 2022-01-06
Payer: COMMERCIAL

## 2022-01-07 ENCOUNTER — OFFICE VISIT (OUTPATIENT)
Dept: INTERNAL MEDICINE | Facility: CLINIC | Age: 42
End: 2022-01-07
Payer: COMMERCIAL

## 2022-01-07 DIAGNOSIS — F90.9 ATTENTION DEFICIT HYPERACTIVITY DISORDER (ADHD), UNSPECIFIED ADHD TYPE: Primary | ICD-10-CM

## 2022-01-07 PROCEDURE — 1160F PR REVIEW ALL MEDS BY PRESCRIBER/CLIN PHARMACIST DOCUMENTED: ICD-10-PCS | Mod: CPTII,95,, | Performed by: INTERNAL MEDICINE

## 2022-01-07 PROCEDURE — 1160F RVW MEDS BY RX/DR IN RCRD: CPT | Mod: CPTII,95,, | Performed by: INTERNAL MEDICINE

## 2022-01-07 PROCEDURE — 1159F PR MEDICATION LIST DOCUMENTED IN MEDICAL RECORD: ICD-10-PCS | Mod: CPTII,95,, | Performed by: INTERNAL MEDICINE

## 2022-01-07 PROCEDURE — 99214 PR OFFICE/OUTPT VISIT, EST, LEVL IV, 30-39 MIN: ICD-10-PCS | Mod: 95,,, | Performed by: INTERNAL MEDICINE

## 2022-01-07 PROCEDURE — 99214 OFFICE O/P EST MOD 30 MIN: CPT | Mod: 95,,, | Performed by: INTERNAL MEDICINE

## 2022-01-07 PROCEDURE — 1159F MED LIST DOCD IN RCRD: CPT | Mod: CPTII,95,, | Performed by: INTERNAL MEDICINE

## 2022-01-07 RX ORDER — DEXTROAMPHETAMINE SACCHARATE, AMPHETAMINE ASPARTATE MONOHYDRATE, DEXTROAMPHETAMINE SULFATE AND AMPHETAMINE SULFATE 2.5; 2.5; 2.5; 2.5 MG/1; MG/1; MG/1; MG/1
10 CAPSULE, EXTENDED RELEASE ORAL EVERY MORNING
Qty: 30 CAPSULE | Refills: 0 | Status: SHIPPED | OUTPATIENT
Start: 2022-01-07 | End: 2022-02-03 | Stop reason: SDUPTHER

## 2022-01-07 NOTE — PROGRESS NOTES
The patient location is: home  The chief complaint leading to consultation is: ADHD    Visit type: audiovisual    Face to Face time with patient: 10 minutes  10 minutes of total time spent on the encounter, which includes face to face time and non-face to face time preparing to see the patient (eg, review of tests), Obtaining and/or reviewing separately obtained history, Documenting clinical information in the electronic or other health record, Independently interpreting results (not separately reported) and communicating results to the patient/family/caregiver, or Care coordination (not separately reported).     Each patient to whom he or she provides medical services by telemedicine is:  (1) informed of the relationship between the physician and patient and the respective role of any other health care provider with respect to management of the patient; and (2) notified that he or she may decline to receive medical services by telemedicine and may withdraw from such care at any time.    [facility] Note    Chief Complaint      Chief Complaint   Patient presents with    ADHD     History of Present Illness      Lesa Leyva is a 41 y.o. female who presents today for ADHD.  Patient comes to appointment alone.    Just had testing done with psychiatry, dx'ed with ADHD.  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  Interested in XR version of medications.    Problem List Items Addressed This Visit    None     Visit Diagnoses     Attention deficit hyperactivity disorder (ADHD), unspecified ADHD type    -  Primary    Relevant Medications    dextroamphetamine-amphetamine (ADDERALL XR) 10 MG 24 hr capsule          Health Maintenance   Topic Date Due    TETANUS VACCINE  01/25/2029    Hepatitis C Screening  Completed    Lipid Panel  Completed       Past Medical History:   Diagnosis Date    Anxiety     Chronic left-sided low back pain without  sciatica     Cystitis     Depression     H/O: hysterectomy 5/29/2019    Hydronephrosis     Insomnia     Nephrolithiasis 11/20/2018    Neuropathy of both feet     Neuropathy of hand     Painful bladder spasm     Primary osteoarthritis of left knee     Pyelonephritis     Ureteral stricture, left     Urinary tract infection        Past Surgical History:   Procedure Laterality Date    BLADDER FULGURATION N/A 5/31/2018    Procedure: FULGURATION-BLADDER;  Surgeon: Angel Hernandez MD;  Location: [facility];  Service: Urology;  Laterality: N/A;    BLADDER SUSPENSION  2013    bladder lifts    CYSTOSCOPY W/ URETERAL STENT PLACEMENT Left 3/2015    CYSTOSCOPY W/ URETERAL STENT REMOVAL Left 5/31/2018    Procedure: CYSTOSCOPY WITH STENT REMOVAL;  Surgeon: Angel Hernandez MD;  [facility] OR;  Service: Urology;  Laterality: Left;    EXTRACORPOREAL SHOCK WAVE LITHOTRIPSY Right 9/6/2018    Procedure: LITHOTRIPSY, ESWL - Right distal ureteral calculus;  Surgeon: Angel Hernandez MD;  Location: [facility];  Service: Urology;  Laterality: Right;    EXTRACORPOREAL SHOCK WAVE LITHOTRIPSY Right 1/16/2020    Procedure: LITHOTRIPSY, ESWL;  Surgeon: Angel Hernandez MD;  Location: [facility];  Service: Urology;  Laterality: Right;    HYSTERECTOMY      Partial - still has ovaries    KNEE SURGERY Left     lithotripsy  4/2012    OVARIAN CYST SURGERY  2006    RETROGRADE PYELOGRAPHY Left 5/31/2018    Procedure: PYELOGRAM, RETROGRADE;  Surgeon: Angel Hernandez MD;  Location: [facility];  Service: Urology;  Laterality: Left;    TOTAL KNEE ARTHROPLASTY Left 5/24/2021    Procedure: ARTHROPLASTY, KNEE, TOTAL monoblock;  Surgeon: Ralf Wilkinson MD;  Location: [facility] OR;  Service: Orthopedics;  Laterality: Left;  Odilon w/ Braydon confirmed 5/21/21 [place]       family history includes Arthritis in her mother; COPD in her maternal grandfather; Cancer in her mother; Depression in her mother; Fibromyalgia in her mother; Hypertension in her father  and mother; No Known Problems in her sister, sister, and sister; Seizures in her brother, brother, and mother.    Social History     Tobacco Use    Smoking status: Never Smoker    Smokeless tobacco: Never Used   Substance Use Topics    Alcohol use: Yes     Comment: 2x/month    Drug use: No       Review of Systems   Constitutional: Negative for chills and fever.   HENT: Negative for hearing loss and sore throat.    Eyes: Negative for discharge.   Respiratory: Negative for cough, shortness of breath and wheezing.    Cardiovascular: Negative for chest pain and palpitations.   Gastrointestinal: Negative for blood in stool, constipation, diarrhea, nausea and vomiting.   Genitourinary: Negative for dysuria and hematuria.   Musculoskeletal: Negative for falls and neck pain.   Neurological: Negative for weakness and headaches.   Endo/Heme/Allergies: Negative for polydipsia.        Outpatient Encounter Medications as of 1/7/2022   Medication Sig Dispense Refill    dextroamphetamine-amphetamine (ADDERALL XR) 10 MG 24 hr capsule Take 1 capsule (10 mg total) by mouth every morning. 30 capsule 0    DULoxetine (CYMBALTA) 20 MG capsule Take 1 capsule (20 mg total) by mouth once daily. 30 capsule 11    duloxetine (CYMBALTA) 60 MG capsule Take 60 mg by mouth once daily.  6    pregabalin (LYRICA) 100 MG capsule Take 1 capsule (100 mg total) by mouth once daily. 180 capsule 0    traZODone (DESYREL) 100 MG tablet Take 1 tablet (100 mg total) by mouth every evening. 90 tablet 3     No facility-administered encounter medications on file as of 1/7/2022.        Review of patient's allergies indicates:   Allergen Reactions    Peaches [peach (prunus persica)] Hives, Itching and Swelling    Tramadol Other (See Comments)     headache       Physical Exam       ]No vital signs taken by me as this is a virtual visit.  Any reported are patient's home measurements.      Physical Exam  Constitutional:       Appearance: She is  well-developed.   HENT:      Head: Normocephalic and atraumatic.      Right Ear: External ear normal.      Left Ear: External ear normal.   Eyes:      General:         Right eye: No discharge.         Left eye: No discharge.   Cardiovascular:      Rate and Rhythm: Normal rate and regular rhythm.      Heart sounds: Normal heart sounds. No murmur heard.      Pulmonary:      Effort: Pulmonary effort is normal. No respiratory distress.      Breath sounds: Normal breath sounds.   Abdominal:      General: There is no distension.      Palpations: Abdomen is soft.      Tenderness: There is no abdominal tenderness. There is no guarding.   Musculoskeletal:         General: Normal range of motion.      Cervical back: Normal range of motion.   Skin:     General: Skin is warm and dry.   Neurological:      Mental Status: She is alert and oriented to person, place, and time.   Psychiatric:         Behavior: Behavior normal.        Laboratory:  CBC:  Recent Labs   Lab Result Units 11/29/21  0718   WBC K/uL 6.36   RBC M/uL 4.89   Hemoglobin g/dL 13.3   Hematocrit % 42.0   Platelets K/uL 317   MCV fL 86   MCH pg 27.2   MCHC g/dL 31.7*     CMP:  Recent Labs   Lab Result Units 11/29/21  0718   Glucose mg/dL 96   Calcium mg/dL 9.5   Albumin g/dL 4.5   Total Protein g/dL 7.5   Sodium mmol/L 142   Potassium mmol/L 4.2   CO2 mmol/L 25   Chloride mmol/L 107   BUN mg/dL 13   Alkaline Phosphatase U/L 82   ALT U/L 13   AST U/L 21   Total Bilirubin mg/dL 0.5     URINALYSIS:  No results for input(s): COLORU, CLARITYU, SPECGRAV, PHUR, PROTEINUA, GLUCOSEU, BILIRUBINCON, BLOODU, WBCU, RBCU, BACTERIA, MUCUS, NITRITE, LEUKOCYTESUR, UROBILINOGEN, HYALINECASTS in the last 2160 hours.   LIPIDS:  Recent Labs   Lab Result Units 11/29/21  0718   TSH uIU/mL 2.010   HDL mg/dL 49   Cholesterol mg/dL 235*   Triglycerides mg/dL 96   LDL Cholesterol mg/dL 166.8*   HDL/Cholesterol Ratio % 20.9   Non-HDL Cholesterol mg/dL 186   Total Cholesterol/HDL Ratio  4.8      TSH:  Recent Labs   Lab Result Units 11/29/21  0718   TSH uIU/mL 2.010     A1C:  Recent Labs   Lab Result Units 11/29/21  0718   Hemoglobin A1C % 5.3       Radiology:  No results found in the last 30 days.     Assessment/Plan     Lesa Leyva is a 41 y.o.female with:    1. Attention deficit hyperactivity disorder (ADHD), unspecified ADHD type  - dextroamphetamine-amphetamine (ADDERALL XR) 10 MG 24 hr capsule; Take 1 capsule (10 mg total) by mouth every morning.  Dispense: 30 capsule; Refill: 0    -start Adderall XR 10 mg daily, counseled on SE's and how to take appropriately, will message me in 2 weeks and let me know how she is doing on this dose.  -Continue current medications and maintain follow up with specialists.    -Follow up in about 3 months (around 4/7/2022) for Virtual Visit.       Carmen Ugalde MD  [facility]                Answers for HPI/ROS submitted by the patient on 1/7/2022  activity change: No  unexpected weight change: No  rhinorrhea: No  trouble swallowing: No  visual disturbance: No  chest tightness: No  polyuria: No  difficulty urinating: No  menstrual problem: No  joint swelling: No  arthralgias: No  confusion: Yes  dysphoric mood: No

## 2022-01-13 ENCOUNTER — LAB VISIT (OUTPATIENT)
Dept: FAMILY MEDICINE | Facility: CLINIC | Age: 42
End: 2022-01-13
Payer: COMMERCIAL

## 2022-01-13 DIAGNOSIS — Z11.52 ENCOUNTER FOR SCREENING FOR SEVERE ACUTE RESPIRATORY SYNDROME CORONAVIRUS 2 (SARS-COV-2) INFECTION: Primary | ICD-10-CM

## 2022-01-13 LAB
CTP QC/QA: YES
SARS-COV-2 AG RESP QL IA.RAPID: NEGATIVE

## 2022-01-13 PROCEDURE — 87811 SARS CORONAVIRUS 2 ANTIGEN POCT, MANUAL READ: ICD-10-PCS | Mod: S$GLB,,, | Performed by: FAMILY MEDICINE

## 2022-01-13 PROCEDURE — 87811 SARS-COV-2 COVID19 W/OPTIC: CPT | Mod: S$GLB,,, | Performed by: FAMILY MEDICINE

## 2022-01-13 NOTE — PROGRESS NOTES
This test is a lateral flow immunoassay intended for the qualitative detection of nucleocapsid protein antigen from SARS- CoV-2 within the first seven days of symptom onset.  Positive results indicate the presence of viral antigens, but clinical correlation with patient history and other diagnostic information is necessary to determine infection status.  Negative results from patients with symptom onset beyond seven days, should be treated as presumptive and confirmation with a molecular assay, if necessary, for patient management, may be performed. Negative results do not rule out SARS-CoV-2 infection and should not be used as the sole basis for treatment or patient management decisions, including infection control decisions.    This test is only for use under the Food and Drug Administration s Emergency Use Authorization (EUA). Commercial kits are provided by [employer].   _________________________________________________________________   The authorized Fact Sheet for Healthcare Providers and the authorized Fact   Sheet for Patients of the ID NOW COVID-19 are available on the FDA   website:   https://www.fda.gov/media/248792/download  https://www.fda.gov/media/490870/download

## 2022-01-21 ENCOUNTER — PATIENT MESSAGE (OUTPATIENT)
Dept: INTERNAL MEDICINE | Facility: CLINIC | Age: 42
End: 2022-01-21
Payer: COMMERCIAL

## 2022-02-02 ENCOUNTER — PATIENT MESSAGE (OUTPATIENT)
Dept: INTERNAL MEDICINE | Facility: CLINIC | Age: 42
End: 2022-02-02
Payer: COMMERCIAL

## 2022-02-02 DIAGNOSIS — F90.9 ATTENTION DEFICIT HYPERACTIVITY DISORDER (ADHD), UNSPECIFIED ADHD TYPE: ICD-10-CM

## 2022-02-03 ENCOUNTER — PATIENT MESSAGE (OUTPATIENT)
Dept: INTERNAL MEDICINE | Facility: CLINIC | Age: 42
End: 2022-02-03
Payer: COMMERCIAL

## 2022-02-03 RX ORDER — DEXTROAMPHETAMINE SACCHARATE, AMPHETAMINE ASPARTATE MONOHYDRATE, DEXTROAMPHETAMINE SULFATE AND AMPHETAMINE SULFATE 2.5; 2.5; 2.5; 2.5 MG/1; MG/1; MG/1; MG/1
10 CAPSULE, EXTENDED RELEASE ORAL EVERY MORNING
Qty: 30 CAPSULE | Refills: 0 | Status: SHIPPED | OUTPATIENT
Start: 2022-02-03 | End: 2022-03-07 | Stop reason: SDUPTHER

## 2022-02-03 NOTE — TELEPHONE ENCOUNTER
No new care gaps identified.  Powered by [employer]. Reference number: [id number].   2/03/2022 8:10:19 AM CST

## 2022-03-07 ENCOUNTER — PATIENT MESSAGE (OUTPATIENT)
Dept: INTERNAL MEDICINE | Facility: CLINIC | Age: 42
End: 2022-03-07
Payer: COMMERCIAL

## 2022-03-07 DIAGNOSIS — F90.9 ATTENTION DEFICIT HYPERACTIVITY DISORDER (ADHD), UNSPECIFIED ADHD TYPE: ICD-10-CM

## 2022-03-07 RX ORDER — DEXTROAMPHETAMINE SACCHARATE, AMPHETAMINE ASPARTATE MONOHYDRATE, DEXTROAMPHETAMINE SULFATE AND AMPHETAMINE SULFATE 2.5; 2.5; 2.5; 2.5 MG/1; MG/1; MG/1; MG/1
10 CAPSULE, EXTENDED RELEASE ORAL EVERY MORNING
Qty: 30 CAPSULE | Refills: 0 | Status: SHIPPED | OUTPATIENT
Start: 2022-03-07 | End: 2022-12-02

## 2022-03-07 NOTE — TELEPHONE ENCOUNTER
No new care gaps identified.  Powered by [employer]. Reference number: [id number].   3/07/2022 8:15:23 AM CST

## 2022-03-13 DIAGNOSIS — Z12.31 OTHER SCREENING MAMMOGRAM: ICD-10-CM

## 2022-03-24 ENCOUNTER — PATIENT MESSAGE (OUTPATIENT)
Dept: INTERNAL MEDICINE | Facility: CLINIC | Age: 42
End: 2022-03-24
Payer: COMMERCIAL

## 2022-03-25 PROBLEM — F90.9 ADHD: Status: ACTIVE | Noted: 2022-03-25

## 2022-04-01 ENCOUNTER — LAB VISIT (OUTPATIENT)
Dept: LAB | Facility: HOSPITAL | Age: 42
End: 2022-04-01
Attending: INTERNAL MEDICINE
Payer: COMMERCIAL

## 2022-04-01 ENCOUNTER — OFFICE VISIT (OUTPATIENT)
Dept: INTERNAL MEDICINE | Facility: CLINIC | Age: 42
End: 2022-04-01
Payer: COMMERCIAL

## 2022-04-01 DIAGNOSIS — N30.00 ACUTE CYSTITIS WITHOUT HEMATURIA: Primary | ICD-10-CM

## 2022-04-01 DIAGNOSIS — R30.0 DYSURIA: ICD-10-CM

## 2022-04-01 DIAGNOSIS — F90.9 ATTENTION DEFICIT HYPERACTIVITY DISORDER (ADHD), UNSPECIFIED ADHD TYPE: ICD-10-CM

## 2022-04-01 LAB
BACTERIA #/AREA URNS AUTO: ABNORMAL /HPF
BILIRUB SERPL-MCNC: ABNORMAL MG/DL
BILIRUB UR QL STRIP: NEGATIVE
BLOOD URINE, POC: ABNORMAL
CLARITY UR REFRACT.AUTO: ABNORMAL
COLOR UR AUTO: YELLOW
COLOR, POC UA: ABNORMAL
GLUCOSE UR QL STRIP: ABNORMAL
GLUCOSE UR QL STRIP: NEGATIVE
HGB UR QL STRIP: NEGATIVE
KETONES UR QL STRIP: ABNORMAL
KETONES UR QL STRIP: NEGATIVE
LEUKOCYTE ESTERASE UR QL STRIP: ABNORMAL
LEUKOCYTE ESTERASE URINE, POC: ABNORMAL
MICROSCOPIC COMMENT: ABNORMAL
NITRITE UR QL STRIP: NEGATIVE
NITRITE, POC UA: ABNORMAL
PH UR STRIP: 6 [PH] (ref 5–8)
PH, POC UA: 6
PROT UR QL STRIP: NEGATIVE
PROTEIN, POC: ABNORMAL
RBC #/AREA URNS AUTO: 2 /HPF (ref 0–4)
SP GR UR STRIP: 1.02 (ref 1–1.03)
SPECIFIC GRAVITY, POC UA: 1.02
SQUAMOUS #/AREA URNS AUTO: 2 /HPF
URN SPEC COLLECT METH UR: ABNORMAL
UROBILINOGEN, POC UA: ABNORMAL
WBC #/AREA URNS AUTO: 23 /HPF (ref 0–5)

## 2022-04-01 PROCEDURE — 81001 URINALYSIS AUTO W/SCOPE: CPT | Performed by: INTERNAL MEDICINE

## 2022-04-01 PROCEDURE — 87077 CULTURE AEROBIC IDENTIFY: CPT | Performed by: INTERNAL MEDICINE

## 2022-04-01 PROCEDURE — 1159F PR MEDICATION LIST DOCUMENTED IN MEDICAL RECORD: ICD-10-PCS | Mod: CPTII,S$GLB,, | Performed by: INTERNAL MEDICINE

## 2022-04-01 PROCEDURE — 99999 PR PBB SHADOW E&M-EST. PATIENT-LVL III: CPT | Mod: PBBFAC,,, | Performed by: INTERNAL MEDICINE

## 2022-04-01 PROCEDURE — 87186 SC STD MICRODIL/AGAR DIL: CPT | Performed by: INTERNAL MEDICINE

## 2022-04-01 PROCEDURE — 87088 URINE BACTERIA CULTURE: CPT | Performed by: INTERNAL MEDICINE

## 2022-04-01 PROCEDURE — 99214 OFFICE O/P EST MOD 30 MIN: CPT | Mod: S$GLB,,, | Performed by: INTERNAL MEDICINE

## 2022-04-01 PROCEDURE — 99999 PR PBB SHADOW E&M-EST. PATIENT-LVL III: ICD-10-PCS | Mod: PBBFAC,,, | Performed by: INTERNAL MEDICINE

## 2022-04-01 PROCEDURE — 87086 URINE CULTURE/COLONY COUNT: CPT | Performed by: INTERNAL MEDICINE

## 2022-04-01 PROCEDURE — 99214 PR OFFICE/OUTPT VISIT, EST, LEVL IV, 30-39 MIN: ICD-10-PCS | Mod: S$GLB,,, | Performed by: INTERNAL MEDICINE

## 2022-04-01 PROCEDURE — 81001 URINALYSIS AUTO W/SCOPE: CPT | Mod: S$GLB,,, | Performed by: INTERNAL MEDICINE

## 2022-04-01 PROCEDURE — 81001 POCT URINALYSIS, DIPSTICK OR TABLET REAGENT, AUTOMATED, WITH MICROSCOP: ICD-10-PCS | Mod: S$GLB,,, | Performed by: INTERNAL MEDICINE

## 2022-04-01 PROCEDURE — 1159F MED LIST DOCD IN RCRD: CPT | Mod: CPTII,S$GLB,, | Performed by: INTERNAL MEDICINE

## 2022-04-01 RX ORDER — LISDEXAMFETAMINE DIMESYLATE CAPSULES 20 MG/1
20 CAPSULE ORAL EVERY MORNING
Qty: 30 CAPSULE | Refills: 0 | Status: SHIPPED | OUTPATIENT
Start: 2022-04-01 | End: 2022-05-02 | Stop reason: SDUPTHER

## 2022-04-01 RX ORDER — NITROFURANTOIN 25; 75 MG/1; MG/1
100 CAPSULE ORAL 2 TIMES DAILY
Qty: 10 CAPSULE | Refills: 0 | Status: SHIPPED | OUTPATIENT
Start: 2022-04-01 | End: 2022-12-02

## 2022-04-01 NOTE — ASSESSMENT & PLAN NOTE
Testing done with psychiatry in 2021.  Put on Adderall XR 10 mg daily last visit, having HA's at 2 Pm when it wears off.

## 2022-04-01 NOTE — PROGRESS NOTES
[facility] Note    Chief Complaint      Chief Complaint   Patient presents with    Pyelonephritis     Possible kidney infection, c/o nausea, back pain, and chills      History of Present Illness      Leas Leyva is a 42 y.o. female who presents today for urinary issues.  Patient comes to appointment alone.    Started with back pain, felt fatigued, had chills.  No dysuria/hematuria, did see pink tinge.  Temp up to 100.0.    Problem List Items Addressed This Visit     ADHD    Current Assessment & Plan     Testing done with psychiatry in 2021.  Put on Adderall XR 10 mg daily last visit, having HA's at 2 Pm when it wears off.           Relevant Medications    lisdexamfetamine (VYVANSE) 20 MG capsule      Other Visit Diagnoses     Acute cystitis without hematuria    -  Primary    Relevant Orders    POCT urinalysis, dipstick or tablet reag    Dysuria        Relevant Orders    Urinalysis, Reflex to Urine Culture Urine, Clean Catch    POCT urinalysis, dipstick or tablet reag          Health Maintenance   Topic Date Due    Mammogram  Never done    TETANUS VACCINE  01/25/2029    Hepatitis C Screening  Completed    Lipid Panel  Completed       Past Medical History:   Diagnosis Date    Anxiety     Chronic left-sided low back pain without sciatica     Cystitis     Depression     H/O: hysterectomy 5/29/2019    Hydronephrosis     Insomnia     Nephrolithiasis 11/20/2018    Neuropathy of both feet     Neuropathy of hand     Painful bladder spasm     Primary osteoarthritis of left knee     Pyelonephritis     Ureteral stricture, left     Urinary tract infection        Past Surgical History:   Procedure Laterality Date    BLADDER FULGURATION N/A 5/31/2018    Procedure: FULGURATION-BLADDER;  Surgeon: Angel Hernandez MD;  Location: [facility] OR;  Service: Urology;  Laterality: N/A;    BLADDER SUSPENSION  2013    bladder lifts    CYSTOSCOPY W/ URETERAL STENT PLACEMENT Left 3/2015    CYSTOSCOPY W/  URETERAL STENT REMOVAL Left 5/31/2018    Procedure: CYSTOSCOPY WITH STENT REMOVAL;  Surgeon: Angel Hernandez MD;  Location: [facility] OR;  Service: Urology;  Laterality: Left;    EXTRACORPOREAL SHOCK WAVE LITHOTRIPSY Right 9/6/2018    Procedure: LITHOTRIPSY, ESWL - Right distal ureteral calculus;  Surgeon: Angel Hernandez MD;  Location: [facility] OR;  Service: Urology;  Laterality: Right;    EXTRACORPOREAL SHOCK WAVE LITHOTRIPSY Right 1/16/2020    Procedure: LITHOTRIPSY, ESWL;  Surgeon: Angel Hernandez MD;  Location: [facility] OR;  Service: Urology;  Laterality: Right;    HYSTERECTOMY      Partial - still has ovaries    KNEE SURGERY Left     lithotripsy  4/2012    OVARIAN CYST SURGERY  2006    RETROGRADE PYELOGRAPHY Left 5/31/2018    Procedure: PYELOGRAM, RETROGRADE;  Surgeon: Angel Hernandez MD;  Location: [facility] OR;  Service: Urology;  Laterality: Left;    TOTAL KNEE ARTHROPLASTY Left 5/24/2021    Procedure: ARTHROPLASTY, KNEE, TOTAL monoblock;  Surgeon: Ralf Wilkinson MD;  Location: [facility] OR;  Service: Orthopedics;  Laterality: Left;  Odilon tavera/ Braydon confirmed 5/21/21 [place]       family history includes Arthritis in her mother; COPD in her maternal grandfather; Cancer in her mother; Depression in her mother; Fibromyalgia in her mother; Hypertension in her father and mother; No Known Problems in her sister, sister, and sister; Seizures in her brother, brother, and mother.    Social History     Tobacco Use    Smoking status: Never Smoker    Smokeless tobacco: Never Used   Substance Use Topics    Alcohol use: Yes     Comment: 2x/month    Drug use: No       ROS     Outpatient Encounter Medications as of 4/1/2022   Medication Sig Dispense Refill    dextroamphetamine-amphetamine (ADDERALL XR) 10 MG 24 hr capsule Take 1 capsule (10 mg total) by mouth every morning. 30 capsule 0    duloxetine (CYMBALTA) 60 MG capsule Take 60 mg by mouth once daily.  6    pregabalin (LYRICA) 100 MG capsule Take 1 capsule (100 mg total) by mouth  once daily. 180 capsule 0    traZODone (DESYREL) 100 MG tablet Take 1 tablet (100 mg total) by mouth every evening. 90 tablet 3    lisdexamfetamine (VYVANSE) 20 MG capsule Take 1 capsule (20 mg total) by mouth every morning. 30 capsule 0    nitrofurantoin, macrocrystal-monohydrate, (MACROBID) 100 MG capsule Take 1 capsule (100 mg total) by mouth 2 (two) times daily. 10 capsule 0     No facility-administered encounter medications on file as of 4/1/2022.        Review of patient's allergies indicates:   Allergen Reactions    Peaches [peach (prunus persica)] Hives, Itching and Swelling    Tramadol Other (See Comments)     headache       Physical Exam      Vital Signs  Temp: (P) 98.2 °F (36.8 °C)  Pulse: (P) 93  SpO2: (P) 96 %  BP: (P) 122/80  Pain Score: (P)   6  Pain Loc: (P) Back  Height and Weight  Weight: (P) 97.1 kg (214 lb 2.8 oz)]    Physical Exam  Constitutional:       Appearance: She is well-developed.   HENT:      Head: Normocephalic and atraumatic.      Right Ear: External ear normal.      Left Ear: External ear normal.   Eyes:      General:         Right eye: No discharge.         Left eye: No discharge.   Cardiovascular:      Rate and Rhythm: Normal rate and regular rhythm.      Heart sounds: Normal heart sounds. No murmur heard.  Pulmonary:      Effort: Pulmonary effort is normal. No respiratory distress.      Breath sounds: Normal breath sounds.   Abdominal:      General: There is no distension.      Palpations: Abdomen is soft.      Tenderness: There is no abdominal tenderness. There is no guarding.   Musculoskeletal:         General: Normal range of motion.      Cervical back: Normal range of motion.   Skin:     General: Skin is warm and dry.   Neurological:      Mental Status: She is alert and oriented to person, place, and time.   Psychiatric:         Behavior: Behavior normal.          Laboratory:  CBC:  No results for input(s): WBC, RBC, HGB, HCT, PLT, MCV, MCH, MCHC in the last 2160  hours.  CMP:  No results for input(s): GLU, CALCIUM, ALBUMIN, PROT, NA, K, CO2, CL, BUN, ALKPHOS, ALT, AST, BILITOT in the last 2160 hours.    Invalid input(s): CREATININ  URINALYSIS:  No results for input(s): COLORU, CLARITYU, SPECGRAV, PHUR, PROTEINUA, GLUCOSEU, BILIRUBINCON, BLOODU, WBCU, RBCU, BACTERIA, MUCUS, NITRITE, LEUKOCYTESUR, UROBILINOGEN, HYALINECASTS in the last 2160 hours.   LIPIDS:  No results for input(s): TSH, HDL, CHOL, TRIG, LDLCALC, CHOLHDL, NONHDLCHOL, TOTALCHOLEST in the last 2160 hours.  TSH:  No results for input(s): TSH in the last 2160 hours.  A1C:  No results for input(s): HGBA1C in the last 2160 hours.    Radiology:  No results found in the last 30 days.     Assessment/Plan     Lesa Leyva is a 42 y.o.female with:    1. Acute cystitis without hematuria  - POCT urinalysis, dipstick or tablet reag    2. Dysuria  - Urinalysis, Reflex to Urine Culture Urine, Clean Catch; Future  - POCT urinalysis, dipstick or tablet reag    3. Attention deficit hyperactivity disorder (ADHD), unspecified ADHD type  - lisdexamfetamine (VYVANSE) 20 MG capsule; Take 1 capsule (20 mg total) by mouth every morning.  Dispense: 30 capsule; Refill: 0    -stop Adderall, change to Vyvanse given HA  -Continue current medications and maintain follow up with specialists.    -Follow up if symptoms worsen or fail to improve.       Carmen Ugalde MD  [facility]

## 2022-04-04 LAB — BACTERIA UR CULT: ABNORMAL

## 2022-04-06 ENCOUNTER — PATIENT MESSAGE (OUTPATIENT)
Dept: INTERNAL MEDICINE | Facility: CLINIC | Age: 42
End: 2022-04-06
Payer: COMMERCIAL

## 2022-04-06 DIAGNOSIS — N20.0 NEPHROLITHIASIS: ICD-10-CM

## 2022-04-06 DIAGNOSIS — R30.0 DYSURIA: Primary | ICD-10-CM

## 2022-04-06 DIAGNOSIS — R10.9 ABDOMINAL PAIN, UNSPECIFIED ABDOMINAL LOCATION: ICD-10-CM

## 2022-04-07 ENCOUNTER — PATIENT MESSAGE (OUTPATIENT)
Dept: INTERNAL MEDICINE | Facility: CLINIC | Age: 42
End: 2022-04-07
Payer: COMMERCIAL

## 2022-04-11 ENCOUNTER — PATIENT MESSAGE (OUTPATIENT)
Dept: INTERNAL MEDICINE | Facility: CLINIC | Age: 42
End: 2022-04-11
Payer: COMMERCIAL

## 2022-04-12 ENCOUNTER — PATIENT MESSAGE (OUTPATIENT)
Dept: INTERNAL MEDICINE | Facility: CLINIC | Age: 42
End: 2022-04-12
Payer: COMMERCIAL

## 2022-04-12 RX ORDER — TIZANIDINE 4 MG/1
4 TABLET ORAL EVERY 6 HOURS PRN
Qty: 30 TABLET | Refills: 0 | Status: SHIPPED | OUTPATIENT
Start: 2022-04-12 | End: 2022-04-22

## 2022-04-12 RX ORDER — DICLOFENAC SODIUM 75 MG/1
75 TABLET, DELAYED RELEASE ORAL 2 TIMES DAILY PRN
Qty: 60 TABLET | Refills: 0 | Status: SHIPPED | OUTPATIENT
Start: 2022-04-12 | End: 2022-05-09

## 2022-04-20 ENCOUNTER — PATIENT MESSAGE (OUTPATIENT)
Dept: INTERNAL MEDICINE | Facility: CLINIC | Age: 42
End: 2022-04-20
Payer: COMMERCIAL

## 2022-04-20 ENCOUNTER — TELEPHONE (OUTPATIENT)
Dept: INTERNAL MEDICINE | Facility: CLINIC | Age: 42
End: 2022-04-20
Payer: COMMERCIAL

## 2022-04-20 DIAGNOSIS — R92.8 ABNORMAL MAMMOGRAM OF LEFT BREAST: Primary | ICD-10-CM

## 2022-04-20 NOTE — TELEPHONE ENCOUNTER
----- Message from Candy Garcia sent at 4/20/2022 11:21 AM CDT -----  Contact: Self [phone]  Patient is returning a phone call.    Who left a message for the patient: nurse    Does patient know what this is regarding:  results     Would you like a call back, or a response through your [facility] portal?:  call back    Comments:

## 2022-05-02 ENCOUNTER — HOSPITAL ENCOUNTER (OUTPATIENT)
Dept: RADIOLOGY | Facility: HOSPITAL | Age: 42
Discharge: HOME | End: 2022-05-02
Attending: INTERNAL MEDICINE
Payer: COMMERCIAL

## 2022-05-02 ENCOUNTER — PATIENT MESSAGE (OUTPATIENT)
Dept: INTERNAL MEDICINE | Facility: CLINIC | Age: 42
End: 2022-05-02
Payer: COMMERCIAL

## 2022-05-02 DIAGNOSIS — R92.8 ABNORMAL MAMMOGRAM OF LEFT BREAST: Primary | ICD-10-CM

## 2022-05-02 DIAGNOSIS — F90.9 ATTENTION DEFICIT HYPERACTIVITY DISORDER (ADHD), UNSPECIFIED ADHD TYPE: ICD-10-CM

## 2022-05-02 DIAGNOSIS — R92.8 ABNORMAL MAMMOGRAM OF LEFT BREAST: ICD-10-CM

## 2022-05-02 PROCEDURE — 76642 ULTRASOUND BREAST LIMITED: CPT | Mod: TC,PO,LT

## 2022-05-02 PROCEDURE — 77065 DX MAMMO INCL CAD UNI: CPT | Mod: TC,PO,LT

## 2022-05-02 RX ORDER — LISDEXAMFETAMINE DIMESYLATE CAPSULES 20 MG/1
20 CAPSULE ORAL EVERY MORNING
Qty: 30 CAPSULE | Refills: 0 | Status: SHIPPED | OUTPATIENT
Start: 2022-05-02 | End: 2022-06-02 | Stop reason: SDUPTHER

## 2022-05-02 NOTE — TELEPHONE ENCOUNTER
No new care gaps identified.  Powered by [employer] Reference number: [id number].   5/02/2022 6:10:02 AM CDT

## 2022-05-02 NOTE — TELEPHONE ENCOUNTER
----- Message from Sallie Heart sent at 5/2/2022 11:49 AM CDT -----  Type:  Needs Medical Advice    Who Called: pt  Symptoms (please be specific): pt is requesting your results from her mammogram/images fax to her [facility]   How long has patient had these symptoms:    Pharmacy name and phone #:    Would the patient rather a call back or a response via [facility]? Please call  Best Call Back Number: [phone]  Additional Information: Dr Leisa Puentes [phone]

## 2022-05-27 RX ORDER — DICLOFENAC SODIUM 75 MG/1
TABLET, DELAYED RELEASE ORAL
Qty: 60 TABLET | Refills: 0 | Status: SHIPPED | OUTPATIENT
Start: 2022-05-27 | End: 2022-11-11 | Stop reason: ALTCHOICE

## 2022-06-02 DIAGNOSIS — F90.9 ATTENTION DEFICIT HYPERACTIVITY DISORDER (ADHD), UNSPECIFIED ADHD TYPE: ICD-10-CM

## 2022-06-02 RX ORDER — LISDEXAMFETAMINE DIMESYLATE CAPSULES 20 MG/1
20 CAPSULE ORAL EVERY MORNING
Qty: 30 CAPSULE | Refills: 0 | Status: SHIPPED | OUTPATIENT
Start: 2022-06-02 | End: 2022-07-07 | Stop reason: SDUPTHER

## 2022-06-02 NOTE — TELEPHONE ENCOUNTER
No new care gaps identified.  [facility] Embedded Care Gaps. Reference number: [id number]. 6/02/2022   1:40:17 PM CDT

## 2022-07-04 ENCOUNTER — HOSPITAL ENCOUNTER (EMERGENCY)
Facility: HOSPITAL | Age: 42
Discharge: HOME | End: 2022-07-04
Attending: EMERGENCY MEDICINE
Payer: COMMERCIAL

## 2022-07-04 VITALS
DIASTOLIC BLOOD PRESSURE: 72 MMHG | TEMPERATURE: 99 F | SYSTOLIC BLOOD PRESSURE: 129 MMHG | HEART RATE: 108 BPM | BODY MASS INDEX: 35.85 KG/M2 | HEIGHT: 64 IN | OXYGEN SATURATION: 97 % | RESPIRATION RATE: 18 BRPM | WEIGHT: 210 LBS

## 2022-07-04 DIAGNOSIS — R10.9 FLANK PAIN: Primary | ICD-10-CM

## 2022-07-04 DIAGNOSIS — N12 PYELONEPHRITIS: ICD-10-CM

## 2022-07-04 LAB
ALBUMIN SERPL BCP-MCNC: 4.8 G/DL (ref 3.5–5.2)
ALP SERPL-CCNC: 82 U/L (ref 38–126)
ALT SERPL W/O P-5'-P-CCNC: 16 U/L (ref 10–44)
ANION GAP SERPL CALC-SCNC: 8 MMOL/L (ref 8–16)
AST SERPL-CCNC: 22 U/L (ref 15–46)
BACTERIA #/AREA URNS AUTO: ABNORMAL /HPF
BASOPHILS # BLD AUTO: 0.04 K/UL (ref 0–0.2)
BASOPHILS NFR BLD: 0.7 % (ref 0–1.9)
BILIRUB SERPL-MCNC: 0.5 MG/DL (ref 0.1–1)
BILIRUB UR QL STRIP: NEGATIVE
CALCIUM SERPL-MCNC: 9.6 MG/DL (ref 8.7–10.5)
CHLORIDE SERPL-SCNC: 105 MMOL/L (ref 95–110)
CLARITY UR REFRACT.AUTO: ABNORMAL
CO2 SERPL-SCNC: 26 MMOL/L (ref 23–29)
COLOR UR AUTO: YELLOW
CREAT SERPL-MCNC: 0.92 MG/DL (ref 0.5–1.4)
DIFFERENTIAL METHOD: ABNORMAL
EOSINOPHIL # BLD AUTO: 0.1 K/UL (ref 0–0.5)
EOSINOPHIL NFR BLD: 1 % (ref 0–8)
ERYTHROCYTE [DISTWIDTH] IN BLOOD BY AUTOMATED COUNT: 14 % (ref 11.5–14.5)
EST. GFR  (AFRICAN AMERICAN): >60 ML/MIN/1.73 M^2
EST. GFR  (NON AFRICAN AMERICAN): >60 ML/MIN/1.73 M^2
GLUCOSE SERPL-MCNC: 87 MG/DL (ref 70–110)
GLUCOSE UR QL STRIP: NEGATIVE
HCT VFR BLD AUTO: 41.5 % (ref 37–48.5)
HGB BLD-MCNC: 13.6 G/DL (ref 12–16)
HGB UR QL STRIP: NEGATIVE
IMM GRANULOCYTES # BLD AUTO: 0.06 K/UL (ref 0–0.04)
IMM GRANULOCYTES NFR BLD AUTO: 1 % (ref 0–0.5)
KETONES UR QL STRIP: NEGATIVE
LEUKOCYTE ESTERASE UR QL STRIP: ABNORMAL
LIPASE SERPL-CCNC: 286 U/L (ref 23–300)
LYMPHOCYTES # BLD AUTO: 1.8 K/UL (ref 1–4.8)
LYMPHOCYTES NFR BLD: 29.8 % (ref 18–48)
MCH RBC QN AUTO: 28.3 PG (ref 27–31)
MCHC RBC AUTO-ENTMCNC: 32.8 G/DL (ref 32–36)
MCV RBC AUTO: 86 FL (ref 82–98)
MICROSCOPIC COMMENT: ABNORMAL
MONOCYTES # BLD AUTO: 0.7 K/UL (ref 0.3–1)
MONOCYTES NFR BLD: 12 % (ref 4–15)
NEUTROPHILS # BLD AUTO: 3.4 K/UL (ref 1.8–7.7)
NEUTROPHILS NFR BLD: 55.5 % (ref 38–73)
NITRITE UR QL STRIP: POSITIVE
NRBC BLD-RTO: 0 /100 WBC
PH UR STRIP: 6 [PH] (ref 5–8)
PLATELET # BLD AUTO: 250 K/UL (ref 150–450)
PMV BLD AUTO: 11.7 FL (ref 9.2–12.9)
POTASSIUM SERPL-SCNC: 4.2 MMOL/L (ref 3.5–5.1)
PROT SERPL-MCNC: 8 G/DL (ref 6–8.4)
PROT UR QL STRIP: NEGATIVE
RBC # BLD AUTO: 4.81 M/UL (ref 4–5.4)
RBC #/AREA URNS AUTO: 0 /HPF (ref 0–4)
SODIUM SERPL-SCNC: 139 MMOL/L (ref 136–145)
SP GR UR STRIP: 1.02 (ref 1–1.03)
URN SPEC COLLECT METH UR: ABNORMAL
UROBILINOGEN UR STRIP-ACNC: NEGATIVE EU/DL
UUN UR-MCNC: 14 MG/DL (ref 7–17)
WBC # BLD AUTO: 6.1 K/UL (ref 3.9–12.7)
WBC #/AREA URNS AUTO: 5 /HPF (ref 0–5)

## 2022-07-04 PROCEDURE — 96365 THER/PROPH/DIAG IV INF INIT: CPT | Mod: ER

## 2022-07-04 PROCEDURE — 99284 EMERGENCY DEPT VISIT MOD MDM: CPT | Mod: 25,ER

## 2022-07-04 PROCEDURE — 96375 TX/PRO/DX INJ NEW DRUG ADDON: CPT | Mod: ER

## 2022-07-04 PROCEDURE — 85025 COMPLETE CBC W/AUTO DIFF WBC: CPT | Mod: ER | Performed by: PHYSICIAN ASSISTANT

## 2022-07-04 PROCEDURE — 81000 URINALYSIS NONAUTO W/SCOPE: CPT | Mod: ER | Performed by: EMERGENCY MEDICINE

## 2022-07-04 PROCEDURE — 25000003 PHARM REV CODE 250: Mod: ER | Performed by: PHYSICIAN ASSISTANT

## 2022-07-04 PROCEDURE — 83690 ASSAY OF LIPASE: CPT | Mod: ER | Performed by: PHYSICIAN ASSISTANT

## 2022-07-04 PROCEDURE — 63600175 PHARM REV CODE 636 W HCPCS: Mod: ER | Performed by: PHYSICIAN ASSISTANT

## 2022-07-04 PROCEDURE — 80053 COMPREHEN METABOLIC PANEL: CPT | Mod: ER | Performed by: PHYSICIAN ASSISTANT

## 2022-07-04 PROCEDURE — 96361 HYDRATE IV INFUSION ADD-ON: CPT | Mod: ER

## 2022-07-04 RX ORDER — AMOXICILLIN AND CLAVULANATE POTASSIUM 875; 125 MG/1; MG/1
1 TABLET, FILM COATED ORAL 2 TIMES DAILY
Qty: 20 TABLET | Refills: 0 | Status: SHIPPED | OUTPATIENT
Start: 2022-07-04 | End: 2022-07-14

## 2022-07-04 RX ORDER — OXYCODONE AND ACETAMINOPHEN 5; 325 MG/1; MG/1
1 TABLET ORAL EVERY 4 HOURS PRN
Qty: 15 TABLET | Refills: 0 | Status: SHIPPED | OUTPATIENT
Start: 2022-07-04 | End: 2022-12-02

## 2022-07-04 RX ORDER — MORPHINE SULFATE 4 MG/ML
4 INJECTION, SOLUTION INTRAMUSCULAR; INTRAVENOUS
Status: COMPLETED | OUTPATIENT
Start: 2022-07-04 | End: 2022-07-04

## 2022-07-04 RX ORDER — KETOROLAC TROMETHAMINE 10 MG/1
10 TABLET, FILM COATED ORAL EVERY 6 HOURS PRN
Qty: 12 TABLET | Refills: 0 | Status: SHIPPED | OUTPATIENT
Start: 2022-07-04 | End: 2022-07-07

## 2022-07-04 RX ORDER — KETOROLAC TROMETHAMINE 30 MG/ML
15 INJECTION, SOLUTION INTRAMUSCULAR; INTRAVENOUS
Status: COMPLETED | OUTPATIENT
Start: 2022-07-04 | End: 2022-07-04

## 2022-07-04 RX ORDER — ONDANSETRON 2 MG/ML
4 INJECTION INTRAMUSCULAR; INTRAVENOUS
Status: COMPLETED | OUTPATIENT
Start: 2022-07-04 | End: 2022-07-04

## 2022-07-04 RX ORDER — TAMSULOSIN HYDROCHLORIDE 0.4 MG/1
0.4 CAPSULE ORAL DAILY
Qty: 30 CAPSULE | Refills: 0 | Status: SHIPPED | OUTPATIENT
Start: 2022-07-04 | End: 2022-11-11 | Stop reason: ALTCHOICE

## 2022-07-04 RX ORDER — ONDANSETRON 4 MG/1
8 TABLET, ORALLY DISINTEGRATING ORAL EVERY 8 HOURS PRN
Qty: 30 TABLET | Refills: 0 | Status: SHIPPED | OUTPATIENT
Start: 2022-07-04 | End: 2023-12-29

## 2022-07-04 RX ADMIN — CEFTRIAXONE 1 G: 1 INJECTION, SOLUTION INTRAVENOUS at 01:07

## 2022-07-04 RX ADMIN — ONDANSETRON 4 MG: 2 INJECTION INTRAMUSCULAR; INTRAVENOUS at 01:07

## 2022-07-04 RX ADMIN — SODIUM CHLORIDE 1000 ML: 0.9 INJECTION, SOLUTION INTRAVENOUS at 01:07

## 2022-07-04 RX ADMIN — KETOROLAC TROMETHAMINE 15 MG: 30 INJECTION, SOLUTION INTRAMUSCULAR at 01:07

## 2022-07-04 RX ADMIN — MORPHINE SULFATE 4 MG: 4 INJECTION INTRAVENOUS at 01:07

## 2022-07-04 NOTE — ED PROVIDER NOTES
Encounter Date: 7/4/2022       History     Chief Complaint   Patient presents with    burning on urination and low back pain     Pt states she has burning on urination and low back pain x 3 days.      Patient is a 42-year-old female with history of kidney stones who presents to the emergency department with right-sided flank pain.  Patient reports over the last 3 days she has had pain in the right flank.  She reports burning upon urination.  She denies fevers.  She reports nausea and vomiting.  She states she is concerned that she has another kidney stone.  She reports normal bowel movements.      The history is provided by the patient.     Review of patient's allergies indicates:   Allergen Reactions    Peaches [peach (prunus persica)] Hives, Itching and Swelling    Tramadol Other (See Comments)     headache     Past Medical History:   Diagnosis Date    Anxiety     Chronic left-sided low back pain without sciatica     Cystitis     Depression     H/O: hysterectomy 5/29/2019    Hydronephrosis     Insomnia     Nephrolithiasis 11/20/2018    Neuropathy of both feet     Neuropathy of hand     Painful bladder spasm     Primary osteoarthritis of left knee     Pyelonephritis     Ureteral stricture, left     Urinary tract infection      Past Surgical History:   Procedure Laterality Date    BLADDER FULGURATION N/A 5/31/2018    Procedure: FULGURATION-BLADDER;  Surgeon: Angel Hernandez MD;  Location: [facility] OR;  Service: Urology;  Laterality: N/A;    BLADDER SUSPENSION  2013    bladder lifts    CYSTOSCOPY W/ URETERAL STENT PLACEMENT Left 3/2015    CYSTOSCOPY W/ URETERAL STENT REMOVAL Left 5/31/2018    Procedure: CYSTOSCOPY WITH STENT REMOVAL;  Surgeon: Angel Hernandez MD;  Location: [facility] OR;  Service: Urology;  Laterality: Left;    EXTRACORPOREAL SHOCK WAVE LITHOTRIPSY Right 9/6/2018    Procedure: LITHOTRIPSY, ESWL - Right distal ureteral calculus;  Surgeon: Angel Hernandez MD;  Location: [facility] OR;   Service: Urology;  Laterality: Right;    EXTRACORPOREAL SHOCK WAVE LITHOTRIPSY Right 1/16/2020    Procedure: LITHOTRIPSY, ESWL;  Surgeon: Angel Hernandez MD;  Location: [facility] OR;  Service: Urology;  Laterality: Right;    HYSTERECTOMY      Partial - still has ovaries    KNEE SURGERY Left     lithotripsy  4/2012    OVARIAN CYST SURGERY  2006    RETROGRADE PYELOGRAPHY Left 5/31/2018    Procedure: PYELOGRAM, RETROGRADE;  Surgeon: Angel Hernandez MD;  Location: [facility] OR;  Service: Urology;  Laterality: Left;    TOTAL KNEE ARTHROPLASTY Left 5/24/2021    Procedure: ARTHROPLASTY, KNEE, TOTAL monoblock;  Surgeon: Ralf Wilkinson MD;  Location: [facility] OR;  Service: Orthopedics;  Laterality: Left;  Odilon tavera/ Braydon confirmed 5/21/21 [place]     Family History   Problem Relation Age of Onset    Hypertension Father     Hypertension Mother     Seizures Mother     Fibromyalgia Mother     Arthritis Mother     Cancer Mother     Depression Mother     No Known Problems Sister     Seizures Brother     Seizures Brother     No Known Problems Sister     No Known Problems Sister     COPD Maternal Grandfather     Cancer Neg Hx     Heart disease Neg Hx     Kidney disease Neg Hx      Social History     Tobacco Use    Smoking status: Never Smoker    Smokeless tobacco: Never Used   Substance Use Topics    Alcohol use: Yes     Comment: 2x/month    Drug use: No     Review of Systems   Constitutional: Positive for appetite change. Negative for activity change, chills, fatigue and fever.   HENT: Negative for congestion, ear discharge, ear pain, postnasal drip, rhinorrhea, sore throat and trouble swallowing.    Respiratory: Negative for cough and shortness of breath.    Cardiovascular: Negative for chest pain.   Gastrointestinal: Positive for nausea and vomiting. Negative for abdominal pain, blood in stool, constipation and diarrhea.   Genitourinary: Positive for dysuria and flank pain.   Musculoskeletal: Negative for back pain, neck  pain and neck stiffness.   Skin: Negative for rash and wound.   Neurological: Negative for dizziness, weakness, light-headedness and headaches.       Physical Exam     Initial Vitals [07/04/22 1143]   BP Pulse Resp Temp SpO2   122/87 (!) 120 17 98.6 °F (37 °C) 99 %      MAP       --         Physical Exam    Nursing note and vitals reviewed.  Constitutional: She appears well-developed and well-nourished. She is not diaphoretic.  Non-toxic appearance. No distress.   HENT:   Head: Normocephalic.   Right Ear: Hearing and external ear normal.   Left Ear: Hearing and external ear normal.   Nose: Nose normal.   Mouth/Throat: Uvula is midline, oropharynx is clear and moist and mucous membranes are normal. No oropharyngeal exudate.   Eyes: Conjunctivae are normal. Pupils are equal, round, and reactive to light.   Neck:   Normal range of motion.  Cardiovascular: Normal rate and regular rhythm.   Pulmonary/Chest: Breath sounds normal.   Abdominal: Abdomen is soft. Bowel sounds are normal. She exhibits no distension and no mass. There is no abdominal tenderness.   There is right CVA tenderness.There is no rebound and no guarding.   Musculoskeletal:      Cervical back: Normal range of motion.     Neurological: She is alert and oriented to person, place, and time.   Skin: Skin is warm and dry. Capillary refill takes less than 2 seconds.   Psychiatric: She has a normal mood and affect.         ED Course   Procedures  Labs Reviewed   URINALYSIS - Abnormal; Notable for the following components:       Result Value    Appearance, UA Hazy (*)     Nitrite, UA Positive (*)     Leukocytes, UA Trace (*)     All other components within normal limits    Narrative:     Collection Type->Urine, Clean Catch   URINALYSIS MICROSCOPIC - Abnormal; Notable for the following components:    Bacteria Many (*)     All other components within normal limits    Narrative:     Collection Type->Urine, Clean Catch   CBC W/ AUTO DIFFERENTIAL - Abnormal; Notable  for the following components:    Immature Granulocytes 1.0 (*)     Immature Grans (Abs) 0.06 (*)     All other components within normal limits   COMPREHENSIVE METABOLIC PANEL   LIPASE          Imaging Results    None          Medications   cefTRIAXone (ROCEPHIN) 1 g/50 mL D5W IVPB (1 g Intravenous New Bag 7/4/22 1354)   sodium chloride 0.9% bolus 1,000 mL (1,000 mLs Intravenous New Bag 7/4/22 1310)   ondansetron injection 4 mg (4 mg Intravenous Given 7/4/22 1311)   ketorolac injection 15 mg (15 mg Intravenous Given 7/4/22 1311)   morphine injection 4 mg (4 mg Intravenous Given 7/4/22 1353)     Medical Decision Making:   Initial Assessment:   Urgent evaluation of a 42-year-old female who presents to the emergency department with right-sided flank pain.  Patient is afebrile and nontoxic appearing.  Patient is tachycardic on initial exam.  Benign abdominal exam.  Right-sided CVA tenderness.  Differential includes but is not limited to UTI, pyelonephritis, obstructive uropathy/hydronephrosis.  Patient has had numerous CT scans in the last several years.  Will start with labs to ensure no kidney insufficiency.  Will give pain management and antiemetics.  ED Management:  1:33 PM  Toradol is not helping pain.  Pt is requesting something stronger.  Will give morphine at this time.  U/A does show signs of infection.  Will give rocephin.    2:13 PM  No anemia.  No kidney insufficiency or electrolyte disturbance.  With no bump in creatinine, I do not feel that repeat imaging is indicated at this time.  Will treat as if patient does have kidney stone.  Pt's last urine culture showed e coli sensitive to both keflex and augmentin.  Will message Dr. Hernandez for follow-up.  Will send home with antiemetics and analgesics.  Patient is given strict return precautions.                      Clinical Impression:   Final diagnoses:  [R10.9] Flank pain (Primary)  [N12] Pyelonephritis          ED Disposition Condition    Discharge Stable         ED Prescriptions     None        Follow-up Information    None          Caryl Carmona PA-C  07/04/22 8912

## 2022-07-04 NOTE — ED NOTES
APPEARANCE: Pt clean and well groomed with clothes appropriately fastened. No distress is noted.    NEURO: Pt is awake and alert x3, Pt follows commands and affect is appropriate. Pt is moving all extremities well and sensation is intact. Speech is clear.    RESPIRATORY: Respirations are even and unlabored on room air. No accessory muscle use noted. Normal rate and effort is noted. Pt placed on continuous pulse ox with O2 sats noted at  98    CARDIAC: No abnormal heart sounds noted. Radial and dorsalis pedis pulses are palpable and +2. No edema noted. Cap refill is < 3    GASTRO: Pt denies abdominal pain/tenderness. States bowel movements have been regular. Bowel sounds are present and normal.     : c/o right sided flank pain. Pain with urination. Frequency. Hx of kidney stones. States feels like a kidney stone. Denies any vomiting or fever.     SKIN: Skin is warm, dry and intact. Skin color is appropriate for ethnicity. Normal skin turgor noted. Mucous membranes are moist.     MUSCULOSKELETAL: No abnormalities are noted.

## 2022-07-05 ENCOUNTER — TELEPHONE (OUTPATIENT)
Dept: UROLOGY | Facility: CLINIC | Age: 42
End: 2022-07-05
Payer: COMMERCIAL

## 2022-07-05 NOTE — TELEPHONE ENCOUNTER
Pt states that she was treated in the ED for kidney stones, she will wait to see how she is feeling within the next couple weeks, if she does not improve she will call back for appt.     ----- Message from Holly Roberson sent at 7/5/2022  9:32 AM CDT -----  Contact: [phone]  Type:  Sooner Apoointment Request    Caller is requesting a sooner appointment.  Caller declined first available appointment listed below.  Caller will not accept being placed on the waitlist and is requesting a message be sent to doctor.  Name of Caller:Pt  When is the first available appointment?8/8  Symptoms: ER Follow up for Kidney Stones and a Kidney Infection   Would the patient rather a call back or a response via [facility]? Call back  Best Call Back Number:[phone]

## 2022-07-05 NOTE — TELEPHONE ENCOUNTER
I attempted to return pt call, no answer. Will call back again to schedule.     ----- Message from Angel Hernandez MD sent at 7/4/2022  2:43 PM CDT -----  Regarding: FW: ED Visit  Pt needs appt with me or NP  ----- Message -----  From: Caryl Carmona PA-C  Sent: 7/4/2022   2:20 PM CDT  To: Angel Hernandez MD  Subject: ED Visit                                         Dr. Hernandez -    Hope you are well.  I saw Ms. Leyva in ER for flank pain.  Normal creatinine.  UTI/pyelo.  Treated with dose of rocephin in ED and sent home with augmentin (last urine culture showed sensitivity to this).  Did not want to scan her with her frequent radiation.  Sent home with pain control, zofran, and flomax.  Was hoping you could see her this week.  Thanks.    Caryl Patel

## 2022-07-06 ENCOUNTER — TELEPHONE (OUTPATIENT)
Dept: UROLOGY | Facility: CLINIC | Age: 42
End: 2022-07-06
Payer: COMMERCIAL

## 2022-07-06 NOTE — TELEPHONE ENCOUNTER
----- Message from Angel Hernandez MD sent at 7/4/2022  2:43 PM CDT -----  Regarding: FW: ED Visit  Pt needs appt with me or NP  ----- Message -----  From: Caryl Carmona PA-C  Sent: 7/4/2022   2:20 PM CDT  To: Angel Hernandez MD  Subject: ED Visit                                         Dr. Hernandez -    Hope you are well.  I saw Ms. Leyva in ER for flank pain.  Normal creatinine.  UTI/pyelo.  Treated with dose of rocephin in ED and sent home with augmentin (last urine culture showed sensitivity to this).  Did not want to scan her with her frequent radiation.  Sent home with pain control, zofran, and flomax.  Was hoping you could see her this week.  Thanks.    Caryl Patel

## 2022-07-07 DIAGNOSIS — F90.9 ATTENTION DEFICIT HYPERACTIVITY DISORDER (ADHD), UNSPECIFIED ADHD TYPE: ICD-10-CM

## 2022-07-07 RX ORDER — LISDEXAMFETAMINE DIMESYLATE CAPSULES 20 MG/1
20 CAPSULE ORAL EVERY MORNING
Qty: 30 CAPSULE | Refills: 0 | Status: SHIPPED | OUTPATIENT
Start: 2022-07-07 | End: 2022-08-11 | Stop reason: SDUPTHER

## 2022-07-07 NOTE — TELEPHONE ENCOUNTER
No new care gaps identified.  [facility] Embedded Care Gaps. Reference number: [id number]. 7/07/2022   11:32:22 AM MALDONADOT

## 2022-08-11 ENCOUNTER — PATIENT MESSAGE (OUTPATIENT)
Dept: INTERNAL MEDICINE | Facility: CLINIC | Age: 42
End: 2022-08-11
Payer: COMMERCIAL

## 2022-08-11 RX ORDER — DEXTROAMPHETAMINE SACCHARATE, AMPHETAMINE ASPARTATE, DEXTROAMPHETAMINE SULFATE AND AMPHETAMINE SULFATE 2.5; 2.5; 2.5; 2.5 MG/1; MG/1; MG/1; MG/1
10 TABLET ORAL
Qty: 30 TABLET | Refills: 0 | Status: SHIPPED | OUTPATIENT
Start: 2022-08-11 | End: 2022-12-02

## 2022-09-29 ENCOUNTER — CLINICAL SUPPORT (OUTPATIENT)
Dept: OTHER | Facility: CLINIC | Age: 42
End: 2022-09-29
Payer: COMMERCIAL

## 2022-09-29 DIAGNOSIS — Z00.8 ENCOUNTER FOR OTHER GENERAL EXAMINATION: ICD-10-CM

## 2022-10-24 LAB
GLUCOSE SERPL-MCNC: 112 MG/DL (ref 60–140)
HDLC SERPL-MCNC: 55 MG/DL
POC CHOLESTEROL, LDL (DOCK): 165 MG/DL
POC CHOLESTEROL, TOTAL: 239 MG/DL
TRIGL SERPL-MCNC: 107 MG/DL

## 2022-10-27 DIAGNOSIS — F90.9 ATTENTION DEFICIT HYPERACTIVITY DISORDER (ADHD), UNSPECIFIED ADHD TYPE: ICD-10-CM

## 2022-10-27 RX ORDER — LISDEXAMFETAMINE DIMESYLATE CAPSULES 20 MG/1
20 CAPSULE ORAL EVERY MORNING
Qty: 30 CAPSULE | Refills: 0 | Status: SHIPPED | OUTPATIENT
Start: 2022-10-27 | End: 2022-12-12 | Stop reason: SDUPTHER

## 2022-10-28 ENCOUNTER — HOSPITAL ENCOUNTER (OUTPATIENT)
Dept: RADIOLOGY | Facility: HOSPITAL | Age: 42
Discharge: HOME | End: 2022-10-28
Attending: INTERNAL MEDICINE
Payer: COMMERCIAL

## 2022-10-28 DIAGNOSIS — R92.8 ABNORMAL MAMMOGRAM OF LEFT BREAST: ICD-10-CM

## 2022-10-28 PROCEDURE — 76642 ULTRASOUND BREAST LIMITED: CPT | Mod: TC,PO,LT

## 2022-10-29 VITALS
BODY MASS INDEX: 37.05 KG/M2 | SYSTOLIC BLOOD PRESSURE: 122 MMHG | HEIGHT: 64 IN | DIASTOLIC BLOOD PRESSURE: 80 MMHG | WEIGHT: 217 LBS

## 2022-11-04 ENCOUNTER — PATIENT MESSAGE (OUTPATIENT)
Dept: UROLOGY | Facility: CLINIC | Age: 42
End: 2022-11-04
Payer: COMMERCIAL

## 2022-11-04 DIAGNOSIS — N22 CALCULUS OF URINARY TRACT IN DISEASES CLASSIFIED ELSEWHERE: Primary | ICD-10-CM

## 2022-11-11 ENCOUNTER — PATIENT MESSAGE (OUTPATIENT)
Dept: UROLOGY | Facility: CLINIC | Age: 42
End: 2022-11-11
Payer: COMMERCIAL

## 2022-11-11 ENCOUNTER — TELEPHONE (OUTPATIENT)
Dept: UROLOGY | Facility: CLINIC | Age: 42
End: 2022-11-11
Payer: COMMERCIAL

## 2022-11-11 DIAGNOSIS — N20.0 BILATERAL NEPHROLITHIASIS: Primary | ICD-10-CM

## 2022-11-11 DIAGNOSIS — R10.9 FLANK PAIN: ICD-10-CM

## 2022-11-11 RX ORDER — KETOROLAC TROMETHAMINE 10 MG/1
10 TABLET, FILM COATED ORAL EVERY 6 HOURS PRN
Qty: 20 TABLET | Refills: 0 | Status: SHIPPED | OUTPATIENT
Start: 2022-11-11 | End: 2022-11-16

## 2022-11-11 RX ORDER — TAMSULOSIN HYDROCHLORIDE 0.4 MG/1
0.4 CAPSULE ORAL DAILY
Qty: 30 CAPSULE | Refills: 1 | Status: SHIPPED | OUTPATIENT
Start: 2022-11-11 | End: 2022-12-05

## 2022-11-11 NOTE — TELEPHONE ENCOUNTER
----- Message from Angel Hernandez MD sent at 11/10/2022 10:28 PM CST -----  Multiple bilateral renal calculi. None obstructive. Can do ESWL on left renal calculs if having left flank pain as left midpole calculus may be obstructing a calyx

## 2022-11-15 ENCOUNTER — PATIENT MESSAGE (OUTPATIENT)
Dept: UROLOGY | Facility: CLINIC | Age: 42
End: 2022-11-15
Payer: COMMERCIAL

## 2022-11-16 DIAGNOSIS — N20.0 BILATERAL NEPHROLITHIASIS: Primary | ICD-10-CM

## 2022-11-16 DIAGNOSIS — N23 RENAL COLIC: ICD-10-CM

## 2022-11-16 RX ORDER — ETODOLAC 500 MG/1
500 TABLET, FILM COATED ORAL 2 TIMES DAILY
Qty: 28 TABLET | Refills: 1 | Status: SHIPPED | OUTPATIENT
Start: 2022-11-16 | End: 2022-11-30

## 2022-11-16 RX ORDER — CYCLOBENZAPRINE HCL 10 MG
10 TABLET ORAL 3 TIMES DAILY PRN
Qty: 21 TABLET | Refills: 0 | Status: SHIPPED | OUTPATIENT
Start: 2022-11-16 | End: 2022-11-23

## 2022-11-18 ENCOUNTER — TELEPHONE (OUTPATIENT)
Dept: UROLOGY | Facility: CLINIC | Age: 42
End: 2022-11-18
Payer: COMMERCIAL

## 2022-11-18 DIAGNOSIS — N20.0 RENAL CALCULUS, LEFT: Primary | ICD-10-CM

## 2022-11-18 DIAGNOSIS — Z01.818 PRE-OP TESTING: Primary | ICD-10-CM

## 2022-11-18 RX ORDER — SODIUM CHLORIDE 9 MG/ML
INJECTION, SOLUTION INTRAVENOUS CONTINUOUS
Status: CANCELLED | OUTPATIENT
Start: 2022-11-18

## 2022-11-18 NOTE — TELEPHONE ENCOUNTER
----- Message from Josette Alva LPN sent at 11/18/2022  3:05 PM CST -----  Regarding: RE: ESWL  I scheduled her December 8th it just hasn't showed up yet  ----- Message -----  From: Tri Rahman MA  Sent: 11/18/2022   2:54 PM CST  To: Josette Alva LPN  Subject: FW: ESWL                                         Pt needs a surgery date for early December.   ----- Message -----  From: Josette Alva LPN  Sent: 11/18/2022   2:45 PM CST  To: [facility]  Subject: ESWL                                             Please call and set up pre-op labs and send surgery packet. Also inform pt of no blood thinners or Asprin for 7-10 days.

## 2022-11-18 NOTE — TELEPHONE ENCOUNTER
----- Message from Josette Alva LPN sent at 11/18/2022  2:43 PM CST -----  Regarding: ESWL  Please call and set up pre-op labs and send surgery packet. Also inform pt of no blood thinners or Asprin for 7-10 days.

## 2022-11-28 ENCOUNTER — PATIENT MESSAGE (OUTPATIENT)
Dept: UROLOGY | Facility: CLINIC | Age: 42
End: 2022-11-28
Payer: COMMERCIAL

## 2022-12-08 ENCOUNTER — TELEPHONE (OUTPATIENT)
Dept: UROLOGY | Facility: CLINIC | Age: 42
End: 2022-12-08
Payer: COMMERCIAL

## 2022-12-08 DIAGNOSIS — N20.0 BILATERAL NEPHROLITHIASIS: ICD-10-CM

## 2022-12-08 DIAGNOSIS — Z01.818 PRE-OP TESTING: Primary | ICD-10-CM

## 2022-12-08 PROBLEM — N23 RENAL COLIC ON LEFT SIDE: Status: ACTIVE | Noted: 2022-12-08

## 2022-12-08 NOTE — TELEPHONE ENCOUNTER
----- Message from Steffany Iverson RN sent at 12/8/2022 12:21 PM CST -----  Regarding: Follow up apt  Good afternoon,     Mrs. Leyva will need a follow up apt with Dr. Hernandez in 3 weeks with a KUB.     Thanks,   Steffany Iverson RN

## 2023-01-26 DIAGNOSIS — F90.9 ATTENTION DEFICIT HYPERACTIVITY DISORDER (ADHD), UNSPECIFIED ADHD TYPE: ICD-10-CM

## 2023-01-27 ENCOUNTER — PATIENT MESSAGE (OUTPATIENT)
Dept: INTERNAL MEDICINE | Facility: CLINIC | Age: 43
End: 2023-01-27
Payer: COMMERCIAL

## 2023-01-27 DIAGNOSIS — F90.9 ATTENTION DEFICIT HYPERACTIVITY DISORDER (ADHD), UNSPECIFIED ADHD TYPE: ICD-10-CM

## 2023-01-27 RX ORDER — LISDEXAMFETAMINE DIMESYLATE CAPSULES 20 MG/1
20 CAPSULE ORAL EVERY MORNING
Qty: 30 CAPSULE | Refills: 0 | OUTPATIENT
Start: 2023-01-27

## 2023-01-27 RX ORDER — LISDEXAMFETAMINE DIMESYLATE CAPSULES 20 MG/1
20 CAPSULE ORAL EVERY MORNING
Qty: 30 CAPSULE | Refills: 0 | Status: SHIPPED | OUTPATIENT
Start: 2023-01-27 | End: 2023-03-07 | Stop reason: SDUPTHER

## 2023-01-27 NOTE — TELEPHONE ENCOUNTER
Care Due:                  Date            Visit Type   Department     Provider  --------------------------------------------------------------------------------                                EP -                              PRIMARY      [facility] PRIMARY  Last Visit: 04-      CARE ([facility])   CARE           Carmen Ugalde  Next Visit: None Scheduled  None         None Found                                                            Last  Test          Frequency    Reason                     Performed    Due Date  --------------------------------------------------------------------------------    Office Visit  12 months..  traZODone................  04- 03-    Health system Embedded Care Gaps. Reference number: [id number]. 1/26/2023   7:31:03 PM CST

## 2023-03-02 ENCOUNTER — PATIENT MESSAGE (OUTPATIENT)
Dept: PRIMARY CARE CLINIC | Facility: CLINIC | Age: 43
End: 2023-03-02
Payer: COMMERCIAL

## 2023-03-22 ENCOUNTER — OFFICE VISIT (OUTPATIENT)
Dept: PRIMARY CARE CLINIC | Facility: CLINIC | Age: 43
End: 2023-03-22
Payer: COMMERCIAL

## 2023-03-22 DIAGNOSIS — R53.83 FATIGUE, UNSPECIFIED TYPE: ICD-10-CM

## 2023-03-22 DIAGNOSIS — F90.9 ATTENTION DEFICIT HYPERACTIVITY DISORDER (ADHD), UNSPECIFIED ADHD TYPE: Primary | ICD-10-CM

## 2023-03-22 DIAGNOSIS — Z12.31 ENCOUNTER FOR SCREENING MAMMOGRAM FOR MALIGNANT NEOPLASM OF BREAST: ICD-10-CM

## 2023-03-22 DIAGNOSIS — F32.A ANXIETY AND DEPRESSION: ICD-10-CM

## 2023-03-22 DIAGNOSIS — F41.9 ANXIETY AND DEPRESSION: ICD-10-CM

## 2023-03-22 DIAGNOSIS — Z00.00 ANNUAL PHYSICAL EXAM: ICD-10-CM

## 2023-03-22 DIAGNOSIS — R63.1 POLYDIPSIA: ICD-10-CM

## 2023-03-22 DIAGNOSIS — Z86.39 HISTORY OF IRON DEFICIENCY: ICD-10-CM

## 2023-03-22 PROCEDURE — 1160F PR REVIEW ALL MEDS BY PRESCRIBER/CLIN PHARMACIST DOCUMENTED: ICD-10-PCS | Mod: CPTII,95,, | Performed by: INTERNAL MEDICINE

## 2023-03-22 PROCEDURE — 99214 OFFICE O/P EST MOD 30 MIN: CPT | Mod: 95,,, | Performed by: INTERNAL MEDICINE

## 2023-03-22 PROCEDURE — 1159F PR MEDICATION LIST DOCUMENTED IN MEDICAL RECORD: ICD-10-PCS | Mod: CPTII,95,, | Performed by: INTERNAL MEDICINE

## 2023-03-22 PROCEDURE — 1160F RVW MEDS BY RX/DR IN RCRD: CPT | Mod: CPTII,95,, | Performed by: INTERNAL MEDICINE

## 2023-03-22 PROCEDURE — 1159F MED LIST DOCD IN RCRD: CPT | Mod: CPTII,95,, | Performed by: INTERNAL MEDICINE

## 2023-03-22 PROCEDURE — 99214 PR OFFICE/OUTPT VISIT, EST, LEVL IV, 30-39 MIN: ICD-10-PCS | Mod: 95,,, | Performed by: INTERNAL MEDICINE

## 2023-03-22 NOTE — ASSESSMENT & PLAN NOTE
On cymbalta 60 mg daily.  No SI/HI/panic attacks.  Has some anxious moments but nothing overwhelming.

## 2023-03-22 NOTE — PROGRESS NOTES
The patient location is: home  The chief complaint leading to consultation is: ADHD    Visit type: audiovisual    Face to Face time with patient: 10 minutes  10 minutes of total time spent on the encounter, which includes face to face time and non-face to face time preparing to see the patient (eg, review of tests), Obtaining and/or reviewing separately obtained history, Documenting clinical information in the electronic or other health record, Independently interpreting results (not separately reported) and communicating results to the patient/family/caregiver, or Care coordination (not separately reported).         Each patient to whom he or she provides medical services by telemedicine is:  (1) informed of the relationship between the physician and patient and the respective role of any other health care provider with respect to management of the patient; and (2) notified that he or she may decline to receive medical services by telemedicine and may withdraw from such care at any time.    [facility] Note    Chief Complaint      Chief Complaint   Patient presents with    ADHD            History of Present Illness      Lesa Leyva is a 43 y.o. female who presents today for ADHD.  Patient comes to appointment alone.      Has been feeling fatigued for last 3 weeks, started with polydipsia last week.  Cousin has diabetes.  Hard time falling asleep but no issues staying asleep.  No snoring (only when sick).  Exercising 15 minutes per day of something.    Problem List Items Addressed This Visit       Anxiety and depression    Current Assessment & Plan     On cymbalta 60 mg daily.  No SI/HI/panic attacks.  Has some anxious moments but nothing overwhelming.         ADHD - Primary    Current Assessment & Plan     Stable on vyvanse 20 mg daily. Testing done with psychiatry in 2021.     Adderall XR 10 gave her HA.          Other Visit Diagnoses       Polydipsia        Fatigue, unspecified type         Annual physical exam        Relevant Orders    CBC Auto Differential    Lipid Panel    Comprehensive Metabolic Panel    TSH    Hemoglobin A1C    History of iron deficiency        Relevant Orders    Iron and TIBC    Ferritin    Encounter for screening mammogram for malignant neoplasm of breast        Relevant Orders    Mammo Digital Screening Bilat w/ Marshal            Health Maintenance   Topic Date Due    Mammogram  05/02/2023    TETANUS VACCINE  01/25/2029    Hepatitis C Screening  Completed    Lipid Panel  Completed       Past Medical History:   Diagnosis Date    Anxiety     Chronic left-sided low back pain without sciatica     Cystitis     Depression     H/O: hysterectomy 5/29/2019    Hydronephrosis     Insomnia     Nephrolithiasis 11/20/2018    Neuropathy of both feet     Neuropathy of hand     Painful bladder spasm     Primary osteoarthritis of left knee     Pyelonephritis     Ureteral stricture, left     Urinary tract infection        Past Surgical History:   Procedure Laterality Date    BLADDER FULGURATION N/A 5/31/2018    Procedure: FULGURATION-BLADDER;  Surgeon: Angel Hernandez MD;  Location: [facility];  Service: Urology;  Laterality: N/A;    BLADDER SUSPENSION  2013    bladder lifts    CYSTOSCOPY W/ URETERAL STENT PLACEMENT Left 3/2015    CYSTOSCOPY W/ URETERAL STENT REMOVAL Left 5/31/2018    Procedure: CYSTOSCOPY WITH STENT REMOVAL;  Surgeon: Angel Hernandez MD;  Location: [facility] OR;  Service: Urology;  Laterality: Left;    EXTRACORPOREAL SHOCK WAVE LITHOTRIPSY Right 9/6/2018    Procedure: LITHOTRIPSY, ESWL - Right distal ureteral calculus;  Surgeon: Angel Hernandez MD;  Location: [facility] OR;  Service: Urology;  Laterality: Right;    EXTRACORPOREAL SHOCK WAVE LITHOTRIPSY Right 1/16/2020    Procedure: LITHOTRIPSY, ESWL;  Surgeon: Angel Hernandez MD;  Location: [facility] OR;  Service: Urology;  Laterality: Right;    EXTRACORPOREAL SHOCK WAVE LITHOTRIPSY Left 12/8/2022    Procedure: LITHOTRIPSY, ESWL;  Surgeon: Angel  VIDAL Hernandez MD;  Location: [facility] OR;  Service: Urology;  Laterality: Left;  ESWL on left renal calculs    HYSTERECTOMY      Partial - still has ovaries    KNEE SURGERY Left     lithotripsy  4/2012    OVARIAN CYST SURGERY  2006    RETROGRADE PYELOGRAPHY Left 5/31/2018    Procedure: PYELOGRAM, RETROGRADE;  Surgeon: Angel Hernandez MD;  Location: [facility] OR;  Service: Urology;  Laterality: Left;    TOTAL KNEE ARTHROPLASTY Left 5/24/2021    Procedure: ARTHROPLASTY, KNEE, TOTAL monoblock;  Surgeon: Ralf Wilkinson MD;  Location: [facility] OR;  Service: Orthopedics;  Laterality: Left;  Odilon tavera/ Braydon confirmed 5/21/21 [place]       family history includes Arthritis in her mother; COPD in her maternal grandfather; Cancer in her mother; Depression in her mother; Fibromyalgia in her mother; Hypertension in her father and mother; No Known Problems in her sister, sister, and sister; Seizures in her brother, brother, and mother.    Social History     Tobacco Use    Smoking status: Never    Smokeless tobacco: Never   Substance Use Topics    Alcohol use: Yes     Comment: 2x/month    Drug use: Never       Review of Systems   Constitutional:  Negative for chills and fever.   Respiratory:  Negative for cough and shortness of breath.    Cardiovascular:  Negative for chest pain and palpitations.   Gastrointestinal:  Negative for constipation, diarrhea, nausea and vomiting.   Genitourinary:  Negative for dysuria and hematuria.   Musculoskeletal:  Negative for falls.   Neurological:  Positive for headaches.   Endo/Heme/Allergies:  Positive for polydipsia.      Outpatient Encounter Medications as of 3/22/2023   Medication Sig Dispense Refill    duloxetine (CYMBALTA) 60 MG capsule Take 60 mg by mouth once daily.  6    estradioL (ESTRACE) 0.5 MG tablet Take 0.5 mg by mouth once daily.      lisdexamfetamine (VYVANSE) 20 MG capsule Take 1 capsule (20 mg total) by mouth every morning. 30 capsule 0    ondansetron (ZOFRAN-ODT) 4 MG TbDL Take 2 tablets (8 mg  total) by mouth every 8 (eight) hours as needed. 30 tablet 0    pregabalin (LYRICA) 100 MG capsule Take 1 capsule (100 mg total) by mouth once daily. 180 capsule 1    tamsulosin (FLOMAX) 0.4 mg Cap Take 1 capsule (0.4 mg total) by mouth every evening. 30 capsule 1    traZODone (DESYREL) 100 MG tablet Take 1 tablet (100 mg total) by mouth every evening. 90 tablet 3    [DISCONTINUED] lisdexamfetamine (VYVANSE) 20 MG capsule Take 1 capsule (20 mg total) by mouth every morning. 30 capsule 0    [DISCONTINUED] tamsulosin (FLOMAX) 0.4 mg Cap Take 1 capsule (0.4 mg total) by mouth once daily. 90 capsule 3     No facility-administered encounter medications on file as of 3/22/2023.        Review of patient's allergies indicates:   Allergen Reactions    Peaches [peach (prunus persica)] Hives, Itching and Swelling    Tramadol Other (See Comments)     headache       Physical Exam       ]    Physical Exam  Constitutional:       Appearance: She is well-developed.   HENT:      Head: Normocephalic and atraumatic.   Cardiovascular:      Rate and Rhythm: Normal rate and regular rhythm.      Heart sounds: Normal heart sounds. No murmur heard.  Pulmonary:      Effort: Pulmonary effort is normal. No respiratory distress.      Breath sounds: Normal breath sounds.   Abdominal:      General: There is no distension.      Palpations: Abdomen is soft.      Tenderness: There is no abdominal tenderness. There is no guarding.   Skin:     General: Skin is warm and dry.   Neurological:      Mental Status: She is alert. Mental status is at baseline.   Psychiatric:         Behavior: Behavior normal.        Laboratory:  CBC:  No results for input(s): WBC, RBC, HGB, HCT, PLT, MCV, MCH, MCHC in the last 2160 hours.  CMP:  No results for input(s): GLU, CALCIUM, ALBUMIN, PROT, NA, K, CO2, CL, BUN, ALKPHOS, ALT, AST, BILITOT in the last 2160 hours.    Invalid input(s): CREATININ  URINALYSIS:  No results for input(s): COLORU, CLARITYU, SPECGRAV, PHUR,  PROTEINUA, GLUCOSEU, BILIRUBINCON, BLOODU, WBCU, RBCU, BACTERIA, MUCUS, NITRITE, LEUKOCYTESUR, UROBILINOGEN, HYALINECASTS in the last 2160 hours.   LIPIDS:  No results for input(s): TSH, HDL, CHOL, TRIG, LDLCALC, CHOLHDL, NONHDLCHOL, TOTALCHOLEST in the last 2160 hours.  TSH:  No results for input(s): TSH in the last 2160 hours.  A1C:  No results for input(s): HGBA1C in the last 2160 hours.    Radiology:  No results found in the last 30 days.     Assessment/Plan     Lesa Leyva is a 43 y.o.female with:    1. Attention deficit hyperactivity disorder (ADHD), unspecified ADHD type    2. Polydipsia    3. Fatigue, unspecified type    4. Annual physical exam  - CBC Auto Differential; Future  - Lipid Panel; Future  - Comprehensive Metabolic Panel; Future  - TSH; Future  - Hemoglobin A1C; Future    5. History of iron deficiency  - Iron and TIBC; Future  - Ferritin; Future    6. Anxiety and depression    7. Encounter for screening mammogram for malignant neoplasm of breast  - Mammo Digital Screening Bilat w/ Marshal; Future      -check labs to r/o diabetes  -Continue current medications and maintain follow up with specialists.    -Follow up in about 3 months (around 6/22/2023) for follow up of medical problems.       Carmen Ugalde MD  [facility]                Answers submitted by the patient for this visit:  Review of Systems Questionnaire (Submitted on 3/22/2023)  activity change: No  unexpected weight change: No  rhinorrhea: No  trouble swallowing: No  visual disturbance: No  chest tightness: No  polyuria: Yes  difficulty urinating: No  menstrual problem: No  joint swelling: No  arthralgias: No  confusion: No  dysphoric mood: No

## 2023-03-22 NOTE — ASSESSMENT & PLAN NOTE
Stable on vyvanse 20 mg daily. Testing done with psychiatry in 2021.     Adderall XR 10 gave her HA.

## 2023-04-27 ENCOUNTER — PATIENT MESSAGE (OUTPATIENT)
Dept: PRIMARY CARE CLINIC | Facility: CLINIC | Age: 43
End: 2023-04-27
Payer: COMMERCIAL

## 2023-04-27 DIAGNOSIS — F32.A ANXIETY AND DEPRESSION: Primary | ICD-10-CM

## 2023-04-27 DIAGNOSIS — F41.9 ANXIETY AND DEPRESSION: Primary | ICD-10-CM

## 2023-04-27 NOTE — TELEPHONE ENCOUNTER
Give # for psych, may be quicker to call your insurance and see which non-[facility] therapists are in network

## 2023-05-10 ENCOUNTER — HOSPITAL ENCOUNTER (OUTPATIENT)
Dept: RADIOLOGY | Facility: HOSPITAL | Age: 43
Discharge: HOME | End: 2023-05-10
Attending: INTERNAL MEDICINE
Payer: COMMERCIAL

## 2023-05-10 DIAGNOSIS — Z12.31 ENCOUNTER FOR SCREENING MAMMOGRAM FOR MALIGNANT NEOPLASM OF BREAST: ICD-10-CM

## 2023-05-10 PROCEDURE — 77063 BREAST TOMOSYNTHESIS BI: CPT | Mod: 26,,, | Performed by: RADIOLOGY

## 2023-05-10 PROCEDURE — 77067 MAMMO DIGITAL SCREENING BILAT WITH TOMO: ICD-10-PCS | Mod: 26,,, | Performed by: RADIOLOGY

## 2023-05-10 PROCEDURE — 77067 SCR MAMMO BI INCL CAD: CPT | Mod: TC,PO

## 2023-05-10 PROCEDURE — 77067 SCR MAMMO BI INCL CAD: CPT | Mod: 26,,, | Performed by: RADIOLOGY

## 2023-05-10 PROCEDURE — 77063 MAMMO DIGITAL SCREENING BILAT WITH TOMO: ICD-10-PCS | Mod: 26,,, | Performed by: RADIOLOGY

## 2023-05-15 NOTE — TELEPHONE ENCOUNTER
No care due was identified.  [facility] Embedded Care Due Messages. Reference number: [id number].   5/15/2023 4:20:58 PM CDT

## 2023-05-16 RX ORDER — PREGABALIN 100 MG/1
CAPSULE ORAL
Qty: 90 CAPSULE | Refills: 3 | Status: SHIPPED | OUTPATIENT
Start: 2023-05-16 | End: 2023-11-15

## 2023-06-01 DIAGNOSIS — F90.9 ATTENTION DEFICIT HYPERACTIVITY DISORDER (ADHD), UNSPECIFIED ADHD TYPE: ICD-10-CM

## 2023-06-01 RX ORDER — LISDEXAMFETAMINE DIMESYLATE CAPSULES 20 MG/1
20 CAPSULE ORAL EVERY MORNING
Qty: 30 CAPSULE | Refills: 0 | Status: SHIPPED | OUTPATIENT
Start: 2023-06-01 | End: 2023-07-19 | Stop reason: SDUPTHER

## 2023-06-01 NOTE — TELEPHONE ENCOUNTER
No care due was identified.  [facility] Embedded Care Due Messages. Reference number: [id number].   6/01/2023 6:35:48 AM CDT

## 2023-06-06 ENCOUNTER — PATIENT MESSAGE (OUTPATIENT)
Dept: PRIMARY CARE CLINIC | Facility: CLINIC | Age: 43
End: 2023-06-06
Payer: COMMERCIAL

## 2023-06-07 ENCOUNTER — PATIENT MESSAGE (OUTPATIENT)
Dept: PRIMARY CARE CLINIC | Facility: CLINIC | Age: 43
End: 2023-06-07
Payer: COMMERCIAL

## 2023-06-08 ENCOUNTER — OFFICE VISIT (OUTPATIENT)
Dept: PRIMARY CARE CLINIC | Facility: CLINIC | Age: 43
End: 2023-06-08
Payer: COMMERCIAL

## 2023-06-08 ENCOUNTER — PATIENT MESSAGE (OUTPATIENT)
Dept: PRIMARY CARE CLINIC | Facility: CLINIC | Age: 43
End: 2023-06-08

## 2023-06-08 VITALS
DIASTOLIC BLOOD PRESSURE: 82 MMHG | SYSTOLIC BLOOD PRESSURE: 138 MMHG | HEART RATE: 122 BPM | BODY MASS INDEX: 36.89 KG/M2 | HEIGHT: 64 IN | OXYGEN SATURATION: 99 % | WEIGHT: 216.06 LBS

## 2023-06-08 DIAGNOSIS — F32.4 MAJOR DEPRESSIVE DISORDER IN PARTIAL REMISSION, UNSPECIFIED WHETHER RECURRENT: ICD-10-CM

## 2023-06-08 DIAGNOSIS — F32.A ANXIETY AND DEPRESSION: ICD-10-CM

## 2023-06-08 DIAGNOSIS — E66.01 SEVERE OBESITY (BMI 35.0-39.9) WITH COMORBIDITY: ICD-10-CM

## 2023-06-08 DIAGNOSIS — R11.2 NAUSEA VOMITING AND DIARRHEA: ICD-10-CM

## 2023-06-08 DIAGNOSIS — R11.14 BILIOUS VOMITING WITH NAUSEA: ICD-10-CM

## 2023-06-08 DIAGNOSIS — R19.7 NAUSEA VOMITING AND DIARRHEA: ICD-10-CM

## 2023-06-08 DIAGNOSIS — R10.9 ABDOMINAL PAIN, UNSPECIFIED ABDOMINAL LOCATION: Primary | ICD-10-CM

## 2023-06-08 DIAGNOSIS — F41.9 ANXIETY AND DEPRESSION: ICD-10-CM

## 2023-06-08 PROCEDURE — 1159F MED LIST DOCD IN RCRD: CPT | Mod: CPTII,S$GLB,, | Performed by: INTERNAL MEDICINE

## 2023-06-08 PROCEDURE — 3044F PR MOST RECENT HEMOGLOBIN A1C LEVEL <7.0%: ICD-10-PCS | Mod: CPTII,S$GLB,, | Performed by: INTERNAL MEDICINE

## 2023-06-08 PROCEDURE — 3079F PR MOST RECENT DIASTOLIC BLOOD PRESSURE 80-89 MM HG: ICD-10-PCS | Mod: CPTII,S$GLB,, | Performed by: INTERNAL MEDICINE

## 2023-06-08 PROCEDURE — 1159F PR MEDICATION LIST DOCUMENTED IN MEDICAL RECORD: ICD-10-PCS | Mod: CPTII,S$GLB,, | Performed by: INTERNAL MEDICINE

## 2023-06-08 PROCEDURE — 3008F PR BODY MASS INDEX (BMI) DOCUMENTED: ICD-10-PCS | Mod: CPTII,S$GLB,, | Performed by: INTERNAL MEDICINE

## 2023-06-08 PROCEDURE — 3044F HG A1C LEVEL LT 7.0%: CPT | Mod: CPTII,S$GLB,, | Performed by: INTERNAL MEDICINE

## 2023-06-08 PROCEDURE — 99999 PR PBB SHADOW E&M-EST. PATIENT-LVL IV: ICD-10-PCS | Mod: PBBFAC,,, | Performed by: INTERNAL MEDICINE

## 2023-06-08 PROCEDURE — 3008F BODY MASS INDEX DOCD: CPT | Mod: CPTII,S$GLB,, | Performed by: INTERNAL MEDICINE

## 2023-06-08 PROCEDURE — 3075F PR MOST RECENT SYSTOLIC BLOOD PRESS GE 130-139MM HG: ICD-10-PCS | Mod: CPTII,S$GLB,, | Performed by: INTERNAL MEDICINE

## 2023-06-08 PROCEDURE — 99999 PR PBB SHADOW E&M-EST. PATIENT-LVL IV: CPT | Mod: PBBFAC,,, | Performed by: INTERNAL MEDICINE

## 2023-06-08 PROCEDURE — 3079F DIAST BP 80-89 MM HG: CPT | Mod: CPTII,S$GLB,, | Performed by: INTERNAL MEDICINE

## 2023-06-08 PROCEDURE — 99214 PR OFFICE/OUTPT VISIT, EST, LEVL IV, 30-39 MIN: ICD-10-PCS | Mod: S$GLB,,, | Performed by: INTERNAL MEDICINE

## 2023-06-08 PROCEDURE — 99214 OFFICE O/P EST MOD 30 MIN: CPT | Mod: S$GLB,,, | Performed by: INTERNAL MEDICINE

## 2023-06-08 PROCEDURE — 3075F SYST BP GE 130 - 139MM HG: CPT | Mod: CPTII,S$GLB,, | Performed by: INTERNAL MEDICINE

## 2023-06-08 RX ORDER — CIPROFLOXACIN 250 MG/1
250 TABLET, FILM COATED ORAL EVERY 12 HOURS
Qty: 14 TABLET | Refills: 0 | OUTPATIENT
Start: 2023-06-08 | End: 2023-08-14

## 2023-06-08 RX ORDER — TRAMADOL HYDROCHLORIDE 50 MG/1
50 TABLET ORAL EVERY 6 HOURS
Qty: 28 TABLET | Refills: 0 | Status: SHIPPED | OUTPATIENT
Start: 2023-06-08 | End: 2023-12-28

## 2023-06-08 NOTE — PROGRESS NOTES
[facility] Note    Chief Complaint      Chief Complaint   Patient presents with    Abdominal Pain    Nausea    Diarrhea     History of Present Illness      Lesa Leyva is a 43 y.o. female who presents today for abd pain, N/V.  Patient comes to appointment alone.    Started with epigastric pain with radiation to back.  Constant, feels different than kidney stone.  Started with nausea/vomiting/diarrhea last night.  No BRBPR.  Stool did have film on top.  Pain worse after eating.  Keeping fluids down.  Taking tums for reflux since pain began.  Taking tylenol, ibuprofen, zofran without relief.  Last emesis at 4 AM, nonbloody/nonbilious. No fever/chills.    Problem List Items Addressed This Visit       Anxiety and depression    Current Assessment & Plan     On cymbalta 60 mg daily.  No SI/HI/panic attacks.  Has some anxious moments but nothing overwhelming.         Severe obesity (BMI 35.0-39.9) with comorbidity    Major depressive disorder in partial remission, unspecified whether recurrent     Other Visit Diagnoses       Abdominal pain, unspecified abdominal location    -  Primary    Relevant Orders    LIPASE    CBC Auto Differential    Comprehensive Metabolic Panel    Urinalysis    Bilious vomiting with nausea        Relevant Orders    CT Abdomen Pelvis  Without Contrast    Nausea vomiting and diarrhea        Relevant Orders    Urinalysis            Health Maintenance   Topic Date Due    Mammogram  05/10/2024    TETANUS VACCINE  01/25/2029    Hepatitis C Screening  Completed    Lipid Panel  Completed       Past Medical History:   Diagnosis Date    Anxiety     Chronic left-sided low back pain without sciatica     Cystitis     Depression     H/O: hysterectomy 5/29/2019    Hydronephrosis     Insomnia     Nephrolithiasis 11/20/2018    Neuropathy of both feet     Neuropathy of hand     Painful bladder spasm     Primary osteoarthritis of left knee     Pyelonephritis     Ureteral stricture, left      Urinary tract infection        Past Surgical History:   Procedure Laterality Date    BLADDER FULGURATION N/A 5/31/2018    Procedure: FULGURATION-BLADDER;  Surgeon: Angel Hernandez MD;  Location: [facility];  Service: Urology;  Laterality: N/A;    BLADDER SUSPENSION  2013    bladder lifts    CYSTOSCOPY W/ URETERAL STENT PLACEMENT Left 3/2015    CYSTOSCOPY W/ URETERAL STENT REMOVAL Left 5/31/2018    Procedure: CYSTOSCOPY WITH STENT REMOVAL;  Surgeon: Angel Hernandez MD;  Location: [facility];  Service: Urology;  Laterality: Left;    EXTRACORPOREAL SHOCK WAVE LITHOTRIPSY Right 9/6/2018    Procedure: LITHOTRIPSY, ESWL - Right distal ureteral calculus;  Surgeon: Angel Hernandez MD;  Location: [facility];  Service: Urology;  Laterality: Right;    EXTRACORPOREAL SHOCK WAVE LITHOTRIPSY Right 1/16/2020    Procedure: LITHOTRIPSY, ESWL;  Surgeon: Angel Hernandez MD;  Location: [facility];  Service: Urology;  Laterality: Right;    EXTRACORPOREAL SHOCK WAVE LITHOTRIPSY Left 12/8/2022    Procedure: LITHOTRIPSY, ESWL;  Surgeon: Angel Hernandez MD;  Location: [facility];  Service: Urology;  Laterality: Left;  ESWL on left renal calculs    HYSTERECTOMY      Partial - still has ovaries    KNEE SURGERY Left     lithotripsy  4/2012    OVARIAN CYST SURGERY  2006    RETROGRADE PYELOGRAPHY Left 5/31/2018    Procedure: PYELOGRAM, RETROGRADE;  Surgeon: Angel Hernandez MD;  Location: [facility];  Service: Urology;  Laterality: Left;    TOTAL KNEE ARTHROPLASTY Left 5/24/2021    Procedure: ARTHROPLASTY, KNEE, TOTAL monoblock;  Surgeon: Ralf Wilkinson MD;  Location: [facility];  Service: Orthopedics;  Laterality: Left;  Odilon w/ Braydon confirmed 5/21/21 [place]       family history includes Arthritis in her mother; Breast cancer in her maternal grandmother; COPD in her maternal grandfather; Cancer in her mother; Depression in her mother; Fibromyalgia in her mother; Hypertension in her father and mother; No Known Problems in her sister, sister, and sister; Seizures in  "her brother, brother, and mother.    Social History     Tobacco Use    Smoking status: Never    Smokeless tobacco: Never   Substance Use Topics    Alcohol use: Not Currently     Comment: 2x/month    Drug use: Never       ROS     Outpatient Encounter Medications as of 6/8/2023   Medication Sig Dispense Refill    duloxetine (CYMBALTA) 60 MG capsule Take 60 mg by mouth once daily.  6    estradioL (ESTRACE) 0.5 MG tablet Take 0.5 mg by mouth once daily.      lisdexamfetamine (VYVANSE) 20 MG capsule Take 1 capsule (20 mg total) by mouth every morning. 30 capsule 0    ondansetron (ZOFRAN-ODT) 4 MG TbDL Take 2 tablets (8 mg total) by mouth every 8 (eight) hours as needed. 30 tablet 0    pregabalin (LYRICA) 100 MG capsule TAKE 1 CAPSULE BY MOUTH ONCE DAILY. 90 capsule 3    tamsulosin (FLOMAX) 0.4 mg Cap Take 1 capsule (0.4 mg total) by mouth every evening. 30 capsule 1    traZODone (DESYREL) 100 MG tablet Take 1 tablet (100 mg total) by mouth every evening. 90 tablet 3     No facility-administered encounter medications on file as of 6/8/2023.        Review of patient's allergies indicates:   Allergen Reactions    Peaches [peach (prunus persica)] Hives, Itching and Swelling    Tramadol Other (See Comments)     headache       Physical Exam      Vital Signs  Pulse: (!) 122  SpO2: 99 %  BP: 138/82  Pain Score:   5  Pain Loc: Back  Height and Weight  Height: 5' 4" (162.6 cm)  Weight: 98 kg (216 lb 0.8 oz)  BSA (Calculated - sq m): 2.1 sq meters  BMI (Calculated): 37.1  Weight in (lb) to have BMI = 25: 145.3]    Physical Exam     Laboratory:  CBC:  Recent Labs   Lab Result Units 03/24/23  0721   WBC K/uL 5.73   RBC M/uL 4.82   Hemoglobin g/dL 13.8   Hematocrit % 42.5   Platelets K/uL 292   MCV fL 88   MCH pg 28.6   MCHC g/dL 32.5     CMP:  Recent Labs   Lab Result Units 03/24/23  0721   Glucose mg/dL 111*   Calcium mg/dL 9.4   Albumin g/dL 4.5   Total Protein g/dL 7.7   Sodium mmol/L 141   Potassium mmol/L 3.9   CO2 mmol/L 25 "   Chloride mmol/L 109   BUN mg/dL 16   Alkaline Phosphatase U/L 88   ALT U/L 24   AST U/L 25   Total Bilirubin mg/dL 0.3     URINALYSIS:  No results for input(s): COLORU, CLARITYU, SPECGRAV, PHUR, PROTEINUA, GLUCOSEU, BILIRUBINCON, BLOODU, WBCU, RBCU, BACTERIA, MUCUS, NITRITE, LEUKOCYTESUR, UROBILINOGEN, HYALINECASTS in the last 2160 hours.   LIPIDS:  Recent Labs   Lab Result Units 03/24/23  0721   TSH uIU/mL 0.834   HDL mg/dL 53   Cholesterol mg/dL 237*   Triglycerides mg/dL 106   LDL Cholesterol mg/dL 162.8*   HDL/Cholesterol Ratio % 22.4   Non-HDL Cholesterol mg/dL 184   Total Cholesterol/HDL Ratio  4.5     TSH:  Recent Labs   Lab Result Units 03/24/23  0721   TSH uIU/mL 0.834     A1C:  Recent Labs   Lab Result Units 03/24/23  0721   Hemoglobin A1C % 5.6       Radiology:  Mammo Digital Screening Bilat w/ Marshal    Result Date: 5/10/2023  Result:  Mammo Digital Screening Bilat w/ Marshal    History:  Patient is 43 y.o. and is seen for a screening mammogram.      Films Compared:  Compared to: 10/28/2022 US Breast Left Limited, 05/02/2022 US Breast Left   Limited, 05/02/2022 Mammo Digital Diagnostic Left with Marshal, and   04/19/2022 Mammo Digital Screening Bilat w/ Marshal     Findings:   This procedure was performed using tomosynthesis.   Computer-aided detection was utilized in the interpretation of this   examination.    The breasts have scattered areas of fibroglandular density. There is no   evidence of suspicious masses, microcalcifications or architectural   distortion.           Assessment/Plan     Lesa Leyva is a 43 y.o.female with:    1. Abdominal pain, unspecified abdominal location  - LIPASE; Future  - CBC Auto Differential; Future  - Comprehensive Metabolic Panel; Future  - Urinalysis; Future    2. Bilious vomiting with nausea  - CT Abdomen Pelvis  Without Contrast; Future    3. Nausea vomiting and diarrhea  - Urinalysis; Future    4. Severe obesity (BMI 35.0-39.9) with comorbidity    5. Major depressive  disorder in partial remission, unspecified whether recurrent    6. Anxiety and depression      -Continue current medications and maintain follow up with specialists.    -Follow up if symptoms worsen or fail to improve.       Carmen Ugalde MD  [facility]

## 2023-07-19 DIAGNOSIS — F90.9 ATTENTION DEFICIT HYPERACTIVITY DISORDER (ADHD), UNSPECIFIED ADHD TYPE: ICD-10-CM

## 2023-07-19 RX ORDER — LISDEXAMFETAMINE DIMESYLATE CAPSULES 20 MG/1
20 CAPSULE ORAL EVERY MORNING
Qty: 30 CAPSULE | Refills: 0 | Status: SHIPPED | OUTPATIENT
Start: 2023-07-19 | End: 2023-08-30 | Stop reason: SDUPTHER

## 2023-07-19 NOTE — TELEPHONE ENCOUNTER
No care due was identified.  [facility] Embedded Care Due Messages. Reference number: [id number].   7/19/2023 11:11:31 AM CDT

## 2023-07-23 ENCOUNTER — PATIENT MESSAGE (OUTPATIENT)
Dept: PRIMARY CARE CLINIC | Facility: CLINIC | Age: 43
End: 2023-07-23
Payer: COMMERCIAL

## 2023-08-14 ENCOUNTER — HOSPITAL ENCOUNTER (EMERGENCY)
Facility: HOSPITAL | Age: 43
Discharge: HOME | End: 2023-08-14
Attending: EMERGENCY MEDICINE
Payer: COMMERCIAL

## 2023-08-14 VITALS
SYSTOLIC BLOOD PRESSURE: 135 MMHG | RESPIRATION RATE: 19 BRPM | DIASTOLIC BLOOD PRESSURE: 89 MMHG | HEART RATE: 93 BPM | WEIGHT: 210 LBS | BODY MASS INDEX: 35.85 KG/M2 | TEMPERATURE: 99 F | OXYGEN SATURATION: 97 % | HEIGHT: 64 IN

## 2023-08-14 DIAGNOSIS — R10.9 FLANK PAIN: ICD-10-CM

## 2023-08-14 DIAGNOSIS — N12 PYELONEPHRITIS: Primary | ICD-10-CM

## 2023-08-14 LAB
ALBUMIN SERPL BCP-MCNC: 4.4 G/DL (ref 3.5–5.2)
ALP SERPL-CCNC: 81 U/L (ref 38–126)
ALT SERPL W/O P-5'-P-CCNC: 23 U/L (ref 10–44)
ANION GAP SERPL CALC-SCNC: 9 MMOL/L (ref 8–16)
AST SERPL-CCNC: 29 U/L (ref 15–46)
BACTERIA #/AREA URNS AUTO: ABNORMAL /HPF
BASOPHILS # BLD AUTO: 0.03 K/UL (ref 0–0.2)
BASOPHILS NFR BLD: 0.3 % (ref 0–1.9)
BILIRUB SERPL-MCNC: 0.3 MG/DL (ref 0.1–1)
BILIRUB UR QL STRIP: NEGATIVE
CALCIUM SERPL-MCNC: 9.5 MG/DL (ref 8.7–10.5)
CHLORIDE SERPL-SCNC: 103 MMOL/L (ref 95–110)
CLARITY UR REFRACT.AUTO: ABNORMAL
CO2 SERPL-SCNC: 25 MMOL/L (ref 23–29)
COLOR UR AUTO: YELLOW
CREAT SERPL-MCNC: 0.71 MG/DL (ref 0.5–1.4)
DIFFERENTIAL METHOD: ABNORMAL
EOSINOPHIL # BLD AUTO: 0.1 K/UL (ref 0–0.5)
EOSINOPHIL NFR BLD: 0.8 % (ref 0–8)
ERYTHROCYTE [DISTWIDTH] IN BLOOD BY AUTOMATED COUNT: 13.1 % (ref 11.5–14.5)
EST. GFR  (NO RACE VARIABLE): >60 ML/MIN/1.73 M^2
GLUCOSE SERPL-MCNC: 96 MG/DL (ref 70–110)
GLUCOSE UR QL STRIP: NEGATIVE
HCT VFR BLD AUTO: 39.8 % (ref 37–48.5)
HGB BLD-MCNC: 13.3 G/DL (ref 12–16)
HGB UR QL STRIP: NEGATIVE
IMM GRANULOCYTES # BLD AUTO: 0.05 K/UL (ref 0–0.04)
IMM GRANULOCYTES NFR BLD AUTO: 0.5 % (ref 0–0.5)
KETONES UR QL STRIP: NEGATIVE
LEUKOCYTE ESTERASE UR QL STRIP: NEGATIVE
LYMPHOCYTES # BLD AUTO: 2.6 K/UL (ref 1–4.8)
LYMPHOCYTES NFR BLD: 24.7 % (ref 18–48)
MCH RBC QN AUTO: 29.6 PG (ref 27–31)
MCHC RBC AUTO-ENTMCNC: 33.4 G/DL (ref 32–36)
MCV RBC AUTO: 89 FL (ref 82–98)
MICROSCOPIC COMMENT: ABNORMAL
MONOCYTES # BLD AUTO: 0.9 K/UL (ref 0.3–1)
MONOCYTES NFR BLD: 9 % (ref 4–15)
NEUTROPHILS # BLD AUTO: 6.7 K/UL (ref 1.8–7.7)
NEUTROPHILS NFR BLD: 64.7 % (ref 38–73)
NITRITE UR QL STRIP: POSITIVE
NRBC BLD-RTO: 0 /100 WBC
PH UR STRIP: 6 [PH] (ref 5–8)
PLATELET # BLD AUTO: 285 K/UL (ref 150–450)
PMV BLD AUTO: 11.3 FL (ref 9.2–12.9)
POTASSIUM SERPL-SCNC: 3.8 MMOL/L (ref 3.5–5.1)
PROT SERPL-MCNC: 7.9 G/DL (ref 6–8.4)
PROT UR QL STRIP: NEGATIVE
RBC # BLD AUTO: 4.49 M/UL (ref 4–5.4)
SODIUM SERPL-SCNC: 137 MMOL/L (ref 136–145)
SP GR UR STRIP: 1.02 (ref 1–1.03)
URN SPEC COLLECT METH UR: ABNORMAL
UROBILINOGEN UR STRIP-ACNC: NEGATIVE EU/DL
UUN UR-MCNC: 16 MG/DL (ref 7–17)
WBC # BLD AUTO: 10.37 K/UL (ref 3.9–12.7)
WBC #/AREA URNS AUTO: 45 /HPF (ref 0–5)

## 2023-08-14 PROCEDURE — 96375 TX/PRO/DX INJ NEW DRUG ADDON: CPT | Mod: ER

## 2023-08-14 PROCEDURE — 25000003 PHARM REV CODE 250: Mod: ER | Performed by: PHYSICIAN ASSISTANT

## 2023-08-14 PROCEDURE — 96365 THER/PROPH/DIAG IV INF INIT: CPT | Mod: ER

## 2023-08-14 PROCEDURE — 87088 URINE BACTERIA CULTURE: CPT | Mod: ER | Performed by: PHYSICIAN ASSISTANT

## 2023-08-14 PROCEDURE — 25000003 PHARM REV CODE 250: Mod: ER

## 2023-08-14 PROCEDURE — 85025 COMPLETE CBC W/AUTO DIFF WBC: CPT | Mod: ER | Performed by: PHYSICIAN ASSISTANT

## 2023-08-14 PROCEDURE — 87077 CULTURE AEROBIC IDENTIFY: CPT | Mod: ER | Performed by: PHYSICIAN ASSISTANT

## 2023-08-14 PROCEDURE — 80053 COMPREHEN METABOLIC PANEL: CPT | Mod: ER | Performed by: PHYSICIAN ASSISTANT

## 2023-08-14 PROCEDURE — 63600175 PHARM REV CODE 636 W HCPCS: Mod: ER | Performed by: PHYSICIAN ASSISTANT

## 2023-08-14 PROCEDURE — 87086 URINE CULTURE/COLONY COUNT: CPT | Mod: ER | Performed by: PHYSICIAN ASSISTANT

## 2023-08-14 PROCEDURE — 87186 SC STD MICRODIL/AGAR DIL: CPT | Mod: ER | Performed by: PHYSICIAN ASSISTANT

## 2023-08-14 PROCEDURE — 81000 URINALYSIS NONAUTO W/SCOPE: CPT | Mod: ER | Performed by: PHYSICIAN ASSISTANT

## 2023-08-14 PROCEDURE — 99285 EMERGENCY DEPT VISIT HI MDM: CPT | Mod: 25,ER

## 2023-08-14 RX ORDER — KETOROLAC TROMETHAMINE 30 MG/ML
15 INJECTION, SOLUTION INTRAMUSCULAR; INTRAVENOUS
Status: COMPLETED | OUTPATIENT
Start: 2023-08-14 | End: 2023-08-14

## 2023-08-14 RX ORDER — HYDROCODONE BITARTRATE AND ACETAMINOPHEN 5; 325 MG/1; MG/1
1 TABLET ORAL EVERY 6 HOURS PRN
Qty: 12 TABLET | Refills: 0 | Status: SHIPPED | OUTPATIENT
Start: 2023-08-14 | End: 2023-12-28

## 2023-08-14 RX ORDER — OXYCODONE AND ACETAMINOPHEN 5; 325 MG/1; MG/1
1 TABLET ORAL
Status: COMPLETED | OUTPATIENT
Start: 2023-08-14 | End: 2023-08-14

## 2023-08-14 RX ORDER — NAPROXEN 375 MG/1
375 TABLET ORAL 2 TIMES DAILY PRN
Qty: 30 TABLET | Refills: 0 | Status: SHIPPED | OUTPATIENT
Start: 2023-08-14 | End: 2023-12-28

## 2023-08-14 RX ORDER — CEFTRIAXONE 500 MG/1
1000 INJECTION, POWDER, FOR SOLUTION INTRAMUSCULAR; INTRAVENOUS
Status: DISCONTINUED | OUTPATIENT
Start: 2023-08-14 | End: 2023-08-14

## 2023-08-14 RX ORDER — CIPROFLOXACIN 500 MG/1
500 TABLET ORAL 2 TIMES DAILY
Qty: 20 TABLET | Refills: 0 | Status: SHIPPED | OUTPATIENT
Start: 2023-08-14 | End: 2023-08-24

## 2023-08-14 RX ADMIN — CEFTRIAXONE SODIUM 1 G: 1 INJECTION, POWDER, FOR SOLUTION INTRAMUSCULAR; INTRAVENOUS at 08:08

## 2023-08-14 RX ADMIN — OXYCODONE HYDROCHLORIDE AND ACETAMINOPHEN 1 TABLET: 5; 325 TABLET ORAL at 07:08

## 2023-08-14 RX ADMIN — KETOROLAC TROMETHAMINE 15 MG: 30 INJECTION, SOLUTION INTRAMUSCULAR; INTRAVENOUS at 08:08

## 2023-08-14 NOTE — FIRST PROVIDER EVALUATION
Emergency Department TeleTriage Encounter Note      CHIEF COMPLAINT    Chief Complaint   Patient presents with    Back Pain     Right saide back pain that wraps around flank, nausea, no vomiting or urinary problems, started a few hours ago, hx of kidney stones, took Toradol and Zofran  1 hour ago        VITAL SIGNS   Initial Vitals [08/14/23 1743]   BP Pulse Resp Temp SpO2   (!) 164/79 99 16 98.5 °F (36.9 °C) 100 %      MAP       --            ALLERGIES    Review of patient's allergies indicates:   Allergen Reactions    Peaches [peach (prunus persica)] Hives, Itching and Swelling    Tramadol Other (See Comments)     headache       PROVIDER TRIAGE NOTE  This is a teletriage evaluation of a 43 y.o. female presenting to the ED complaining of back pain. Patient reports right sided back pain that radiates into flank. She denies hematuria. History of kidney stones, last one 1 year ago. She took zofran and toradol PTA.    Patient is alert and oriented. She speaks in complete sentences. She is sitting upright in the chair in no distress. She appears uncomfortable.    Initial orders will be placed and care will be transferred to an alternate provider when patient is roomed for a full evaluation. Any additional orders and the final disposition will be determined by that provider.         ORDERS  Labs Reviewed   CBC W/ AUTO DIFFERENTIAL   COMPREHENSIVE METABOLIC PANEL   URINALYSIS, REFLEX TO URINE CULTURE       ED Orders (720h ago, onward)      Start Ordered     Status Ordering Provider    08/14/23 1755 08/14/23 1754  Saline lock IV  Once         Ordered RADHASUSHANT G.    08/14/23 1755 08/14/23 1754  CBC auto differential  STAT         Ordered RADHA, SUSHANT G.    08/14/23 1755 08/14/23 1754  Comprehensive metabolic panel  STAT         Ordered RADHA, SUSHANT G.    08/14/23 1755 08/14/23 1754  Urinalysis, Reflex to Urine Culture Urine, Clean Catch  STAT         Ordered RADHA, SUSHANT G.    08/14/23 1755 08/14/23 1754  CT Renal  Stone Study ABD Pelvis WO  1 time imaging         Ordered SUSHANT GARCIA              Virtual Visit Note: The provider triage portion of this emergency department evaluation and documentation was performed via [employer], a HIPAA-compliant telemedicine application, in concert with a tele-presenter in the room. A face to face patient evaluation with one of my colleagues will occur once the patient is placed in an emergency department room.      DISCLAIMER: This note was prepared with [employer] voice recognition transcription software. Garbled syntax, mangled pronouns, and other bizarre constructions may be attributed to that software system.

## 2023-08-14 NOTE — ED PROVIDER NOTES
Encounter Date: 8/14/2023       History     Chief Complaint   Patient presents with    Back Pain     Right saide back pain that wraps around flank, nausea, no vomiting or urinary problems, started a few hours ago, hx of kidney stones, took Toradol and Zofran  1 hour ago      43-year-old female with history of anxiety, depression, recurrent nephrolithiasis presents today for nausea and right-sided flank pain radiating into the right lower quadrant and groin that began approximately 4 hours prior to arrival.  Patient has long history of kidney stones and notes that this feels the same.  She took Toradol and Zofran without relief.  She currently denies fever, chills, vomiting, chest pain, shortness of breath, dysuria, hematuria.    The history is provided by the patient and medical records. No  was used.      Review of patient's allergies indicates:   Allergen Reactions    Peaches [peach (prunus persica)] Hives, Itching and Swelling    Tramadol Other (See Comments)     headache     Past Medical History:   Diagnosis Date    Anxiety     Chronic left-sided low back pain without sciatica     Cystitis     Depression     H/O: hysterectomy 5/29/2019    Hydronephrosis     Insomnia     Nephrolithiasis 11/20/2018    Neuropathy of both feet     Neuropathy of hand     Painful bladder spasm     Primary osteoarthritis of left knee     Pyelonephritis     Ureteral stricture, left     Urinary tract infection      Past Surgical History:   Procedure Laterality Date    BLADDER FULGURATION N/A 5/31/2018    Procedure: FULGURATION-BLADDER;  Surgeon: Angel Hernandez MD;  Location: [facility] OR;  Service: Urology;  Laterality: N/A;    BLADDER SUSPENSION  2013    bladder lifts    CYSTOSCOPY W/ URETERAL STENT PLACEMENT Left 3/2015    CYSTOSCOPY W/ URETERAL STENT REMOVAL Left 5/31/2018    Procedure: CYSTOSCOPY WITH STENT REMOVAL;  Surgeon: Angel Hernandez MD;  Location: [facility] OR;  Service: Urology;  Laterality: Left;     EXTRACORPOREAL SHOCK WAVE LITHOTRIPSY Right 9/6/2018    Procedure: LITHOTRIPSY, ESWL - Right distal ureteral calculus;  Surgeon: Angel Hernandez MD;  Location: [facility] OR;  Service: Urology;  Laterality: Right;    EXTRACORPOREAL SHOCK WAVE LITHOTRIPSY Right 1/16/2020    Procedure: LITHOTRIPSY, ESWL;  Surgeon: Angel Hernandez MD;  Location: [facility] OR;  Service: Urology;  Laterality: Right;    EXTRACORPOREAL SHOCK WAVE LITHOTRIPSY Left 12/8/2022    Procedure: LITHOTRIPSY, ESWL;  Surgeon: Angel Hernandez MD;  Location: [facility] OR;  Service: Urology;  Laterality: Left;  ESWL on left renal calculs    HYSTERECTOMY      Partial - still has ovaries    KNEE SURGERY Left     lithotripsy  4/2012    OVARIAN CYST SURGERY  2006    RETROGRADE PYELOGRAPHY Left 5/31/2018    Procedure: PYELOGRAM, RETROGRADE;  Surgeon: Angel Hernandez MD;  Location: [facility] OR;  Service: Urology;  Laterality: Left;    TOTAL KNEE ARTHROPLASTY Left 5/24/2021    Procedure: ARTHROPLASTY, KNEE, TOTAL monoblock;  Surgeon: Ralf Wilkinson MD;  Location: [facility] OR;  Service: Orthopedics;  Laterality: Left;  Odilon tavera/ Braydon confirmed 5/21/21 [place]     Family History   Problem Relation Age of Onset    Hypertension Mother     Seizures Mother     Fibromyalgia Mother     Arthritis Mother     Cancer Mother     Depression Mother     Hypertension Father     No Known Problems Sister     No Known Problems Sister     No Known Problems Sister     Breast cancer Maternal Grandmother     COPD Maternal Grandfather     Seizures Brother     Seizures Brother     Cancer Neg Hx     Heart disease Neg Hx     Kidney disease Neg Hx      Social History     Tobacco Use    Smoking status: Never    Smokeless tobacco: Never   Substance Use Topics    Alcohol use: Not Currently     Comment: 2x/month    Drug use: Never     Review of Systems   Constitutional:  Negative for chills and fever.   HENT:  Negative for sore throat.    Respiratory:  Negative for shortness of breath.    Cardiovascular:  Negative for  chest pain.   Gastrointestinal:  Positive for abdominal pain and nausea. Negative for vomiting.   Genitourinary:  Positive for flank pain. Negative for dysuria.   Musculoskeletal:  Negative for back pain.   Skin:  Negative for rash.   Neurological:  Negative for weakness.   Hematological:  Does not bruise/bleed easily.       Physical Exam     Initial Vitals [08/14/23 1743]   BP Pulse Resp Temp SpO2   (!) 164/79 99 16 98.5 °F (36.9 °C) 100 %      MAP       --         Physical Exam    Nursing note and vitals reviewed.  Constitutional: Vital signs are normal. She appears well-developed and well-nourished. She is not diaphoretic.  Non-toxic appearance. She does not have a sickly appearance. She appears ill. No distress.   HENT:   Head: Normocephalic and atraumatic.   Right Ear: External ear normal.   Left Ear: External ear normal.   Nose: Nose normal.   Eyes: Conjunctivae and EOM are normal. Right eye exhibits no discharge. Left eye exhibits no discharge. No scleral icterus.   Neck: Neck supple.   Cardiovascular:  Normal rate, regular rhythm and intact distal pulses.           Pulmonary/Chest: Breath sounds normal. No respiratory distress. She has no wheezes.   Abdominal: Abdomen is soft. She exhibits no distension. There is abdominal tenderness in the right lower quadrant.   There is right CVA tenderness.  No left CVA tenderness. There is rebound. There is no guarding.   Musculoskeletal:      Cervical back: Normal and neck supple.      Thoracic back: Normal.      Lumbar back: Normal.     Neurological: She is alert and oriented to person, place, and time. GCS score is 15. GCS eye subscore is 4. GCS verbal subscore is 5. GCS motor subscore is 6.   Skin: Skin is warm and dry. Capillary refill takes less than 2 seconds.   Psychiatric: She has a normal mood and affect. Her behavior is normal. Judgment and thought content normal.         ED Course   Procedures  Labs Reviewed   CBC W/ AUTO DIFFERENTIAL   COMPREHENSIVE  METABOLIC PANEL   URINALYSIS, REFLEX TO URINE CULTURE          Imaging Results              CT Renal Stone Study ABD Pelvis WO (Final result)  Result time 08/14/23 18:54:28      Final result by Karlo Velez MD (08/14/23 18:54:28)                   Impression:      Punctate bilateral renal calculi.  Bilateral pelviectasis.    Mild fat containing umbilical hernia    Remaining findings as above    All CT scans   are performed using dose optimization techniques including the following: automated exposure control; adjustment of the mA and/or kV; use of iterative reconstruction technique.  Dose modulation was employed for ALARA by means of: Automated exposure control; adjustment of the mA and/or kV according to patient size (this includes techniques or standardized protocols for targeted exams where dose is matched to indication/reason for exam; i.e. extremities or head); and/or use of iterative reconstructive technique.      Electronically signed by: Karlo Velez  Date:    08/14/2023  Time:    18:54               Narrative:    EXAMINATION:  CT RENAL STONE STUDY ABD PELVIS WO    CLINICAL HISTORY:  Flank pain, kidney stone suspected;    TECHNIQUE:  Low dose axial images, sagittal and coronal reformations were obtained from the lung bases to the pubic symphysis.  Contrast was not administered.    COMPARISON:  None    FINDINGS:  Heart: Normal in size. No pericardial effusion.    Lung Bases: Well aerated, without consolidation or pleural fluid.    Liver: Normal in size and attenuation, with no focal hepatic lesions.    Gallbladder: No calcified gallstones.    Bile Ducts: No evidence of dilated ducts.    Pancreas: No mass or peripancreatic fat stranding.    Spleen: Unremarkable.    Adrenals: Unremarkable.    Kidneys/ Ureters: Punctate bilateral renal calculi.  Bilateral pelviectasis.    Bladder: No evidence of wall thickening.    Reproductive organs: Unremarkable.    GI Tract/Mesentery: No evidence of bowel obstruction or  "inflammation.  Colonic diverticulosis.  No secondary signs of appendicitis.  The appendix was not definitively identified.    Peritoneal Space: No ascites. No free air.    Retroperitoneum: No significant adenopathy.    Abdominal wall: Fat containing umbilical hernia.    Vasculature: No  aneurysm.    Bones: No acute fracture.                                       Medications   oxyCODONE-acetaminophen 5-325 mg per tablet 1 tablet (1 tablet Oral Given 8/14/23 1906)     Medical Decision Making:   Initial Assessment:   43-year-old female with history of anxiety, depression, recurrent nephrolithiasis presents today for nausea and right-sided flank pain radiating into the right lower quadrant and groin that began approximately 4 hours prior to arrival. On exam she is holding an emesis bag and appears uncomfortable. VSS, she is afebrile.  There is right lower quadrant pain and right-sided CVA tenderness.  Heart and lungs clear to auscultation.  She is ambulatory with steady gait.  Differential Diagnosis:   43 year old presenting 2/2 kidney stone pain. Patient is otherwise well appearing with low suspicion for sepsis, dissection, infected obstructed renal colic, appendicitis, torsion, acute basil, or intra-abdominal infection.   CT showing: "Punctate bilateral renal calculi.  Bilateral pelviectasis."  Clinical Tests:   Lab Tests: Ordered  Radiological Study: Ordered and Reviewed  ED Management:  Oxycodone given for pain. Care signed out to evening Caryl RADFORD                          Clinical Impression:   Final diagnoses:  [R10.9] Flank pain (Primary)               Cm Bose PA-C  08/14/23 1911    "

## 2023-08-14 NOTE — Clinical Note
"Lesa Hectorelle" Leyva was seen and treated in our emergency department on 8/14/2023.  She may return to work on 08/17/2023.       If you have any questions or concerns, please don't hesitate to call.      Caryl Carmona PA-C"

## 2023-08-15 NOTE — ED NOTES
LOC:The patient is awake, alert and cooperative with a calm affect, patient is aware of environment and behaving in an age appropriate manor, patient recognizes caregiver and is speaking appropriately for age.    APPEARANCE: Resting comfortably, in no acute distress, the patient has clean hair, skin and nails, patient's clothing is properly fastened.    RESPIRATORY: Airway is open and patent, respirations are spontaneous, normal respiratory effort and rate noted.     MUSCULOSKELETAL: Patient moving all extremities well, no obvious deformities noted.    SKIN: The skin is warm and dry, patient has normal skin turgor and moist mucus membranes, no breakdown or brusing noted.    ABDOMEN: Soft and non tender in all four quadrants.     Pt presents to ER with c/o possible kidney stone. Right side flank pain started yesterday. Hx of kidney stones. Denies any blood in urine or pain with urination. No fever

## 2023-08-15 NOTE — PROVIDER PROGRESS NOTES - EMERGENCY DEPT.
Encounter Date: 8/14/2023    ED Physician Progress Notes          ED Course: I, Caryl Carmona PA-C, have assumed care of this patient from Mary SARAVIA. Patient is a 43 yr old female who presents to the ED with flank pain.    At the time of signout plan was pending labs and urine - CT showed no obstructing stone or another acute findings.    Findings and plan:  Normal creatinine.  Urinalysis is indicative of infection.  With associated CVA tenderness, will treat for pyelonephritis.  Given Rocephin.  Will be discharged with cipro.  Advised to follow up with PCP in 24-48 hours or return to the emergency department with any worsening symptoms or concerns.

## 2023-08-17 LAB — BACTERIA UR CULT: ABNORMAL

## 2023-08-20 RX ORDER — CEFPODOXIME PROXETIL 100 MG/1
200 TABLET, FILM COATED ORAL EVERY 12 HOURS
Qty: 40 TABLET | Refills: 0 | Status: SHIPPED | OUTPATIENT
Start: 2023-08-20 | End: 2023-08-30

## 2023-08-30 DIAGNOSIS — F90.9 ATTENTION DEFICIT HYPERACTIVITY DISORDER (ADHD), UNSPECIFIED ADHD TYPE: ICD-10-CM

## 2023-08-30 RX ORDER — LISDEXAMFETAMINE DIMESYLATE CAPSULES 20 MG/1
20 CAPSULE ORAL EVERY MORNING
Qty: 30 CAPSULE | Refills: 0 | Status: SHIPPED | OUTPATIENT
Start: 2023-08-30 | End: 2023-10-04 | Stop reason: SDUPTHER

## 2023-08-30 NOTE — TELEPHONE ENCOUNTER
No care due was identified.  [facility] Embedded Care Due Messages. Reference number: [id number].   8/30/2023 12:24:16 PM CDT   - CXR notes possible aneurysmal aorta, new finding since last CXR  - Patient is currently asymptomatic, recommend that patient follow-up outpatient with primary care doctor for further evaluation

## 2023-09-27 ENCOUNTER — CLINICAL SUPPORT (OUTPATIENT)
Dept: OTHER | Facility: CLINIC | Age: 43
End: 2023-09-27

## 2023-09-27 DIAGNOSIS — Z00.8 ENCOUNTER FOR OTHER GENERAL EXAMINATION: ICD-10-CM

## 2023-09-28 VITALS
BODY MASS INDEX: 34.66 KG/M2 | WEIGHT: 203 LBS | DIASTOLIC BLOOD PRESSURE: 81 MMHG | HEIGHT: 64 IN | SYSTOLIC BLOOD PRESSURE: 124 MMHG

## 2023-09-28 LAB
GLUCOSE SERPL-MCNC: 98 MG/DL (ref 60–140)
HDLC SERPL-MCNC: 49 MG/DL
POC CHOLESTEROL, LDL (DOCK): 160 MG/DL
POC CHOLESTEROL, TOTAL: 225 MG/DL
TRIGL SERPL-MCNC: 89 MG/DL

## 2023-10-04 DIAGNOSIS — F90.9 ATTENTION DEFICIT HYPERACTIVITY DISORDER (ADHD), UNSPECIFIED ADHD TYPE: ICD-10-CM

## 2023-10-04 RX ORDER — LISDEXAMFETAMINE DIMESYLATE CAPSULES 20 MG/1
20 CAPSULE ORAL EVERY MORNING
Qty: 30 CAPSULE | Refills: 0 | Status: SHIPPED | OUTPATIENT
Start: 2023-10-04 | End: 2023-10-11 | Stop reason: SDUPTHER

## 2023-10-04 NOTE — TELEPHONE ENCOUNTER
No care due was identified.  [facility] Embedded Care Due Messages. Reference number: [id number].   10/04/2023 7:03:10 AM CDT

## 2023-10-11 ENCOUNTER — PATIENT MESSAGE (OUTPATIENT)
Dept: PRIMARY CARE CLINIC | Facility: CLINIC | Age: 43
End: 2023-10-11
Payer: COMMERCIAL

## 2023-10-11 DIAGNOSIS — F90.9 ATTENTION DEFICIT HYPERACTIVITY DISORDER (ADHD), UNSPECIFIED ADHD TYPE: ICD-10-CM

## 2023-10-11 RX ORDER — LISDEXAMFETAMINE DIMESYLATE CAPSULES 20 MG/1
20 CAPSULE ORAL EVERY MORNING
Qty: 30 CAPSULE | Refills: 0 | Status: SHIPPED | OUTPATIENT
Start: 2023-10-11 | End: 2023-11-14 | Stop reason: SDUPTHER

## 2023-10-11 NOTE — TELEPHONE ENCOUNTER
No care due was identified.  [facility] Embedded Care Due Messages. Reference number: [id number].   10/11/2023 7:52:22 AM CDT

## 2023-10-14 DIAGNOSIS — F51.01 PRIMARY INSOMNIA: ICD-10-CM

## 2023-10-14 NOTE — TELEPHONE ENCOUNTER
No care due was identified.  [facility] Embedded Care Due Messages. Reference number: [id number].   10/14/2023 1:01:37 AM CDT

## 2023-10-16 RX ORDER — TRAZODONE HYDROCHLORIDE 100 MG/1
100 TABLET ORAL NIGHTLY
Qty: 90 TABLET | Refills: 3 | Status: SHIPPED | OUTPATIENT
Start: 2023-10-16

## 2023-11-06 ENCOUNTER — TELEPHONE (OUTPATIENT)
Dept: PRIMARY CARE CLINIC | Facility: CLINIC | Age: 43
End: 2023-11-06
Payer: COMMERCIAL

## 2023-11-06 ENCOUNTER — OFFICE VISIT (OUTPATIENT)
Dept: PRIMARY CARE CLINIC | Facility: CLINIC | Age: 43
End: 2023-11-06
Payer: COMMERCIAL

## 2023-11-06 VITALS
WEIGHT: 202.81 LBS | HEIGHT: 64 IN | BODY MASS INDEX: 34.62 KG/M2 | OXYGEN SATURATION: 99 % | SYSTOLIC BLOOD PRESSURE: 132 MMHG | DIASTOLIC BLOOD PRESSURE: 82 MMHG | HEART RATE: 96 BPM

## 2023-11-06 DIAGNOSIS — L02.92 BOIL: Primary | ICD-10-CM

## 2023-11-06 DIAGNOSIS — G43.909 MIGRAINE WITHOUT STATUS MIGRAINOSUS, NOT INTRACTABLE, UNSPECIFIED MIGRAINE TYPE: ICD-10-CM

## 2023-11-06 PROCEDURE — 3044F HG A1C LEVEL LT 7.0%: CPT | Mod: CPTII,S$GLB,, | Performed by: STUDENT IN AN ORGANIZED HEALTH CARE EDUCATION/TRAINING PROGRAM

## 2023-11-06 PROCEDURE — 99999 PR PBB SHADOW E&M-EST. PATIENT-LVL IV: ICD-10-PCS | Mod: PBBFAC,,, | Performed by: STUDENT IN AN ORGANIZED HEALTH CARE EDUCATION/TRAINING PROGRAM

## 2023-11-06 PROCEDURE — 1160F PR REVIEW ALL MEDS BY PRESCRIBER/CLIN PHARMACIST DOCUMENTED: ICD-10-PCS | Mod: CPTII,S$GLB,, | Performed by: STUDENT IN AN ORGANIZED HEALTH CARE EDUCATION/TRAINING PROGRAM

## 2023-11-06 PROCEDURE — 3079F DIAST BP 80-89 MM HG: CPT | Mod: CPTII,S$GLB,, | Performed by: STUDENT IN AN ORGANIZED HEALTH CARE EDUCATION/TRAINING PROGRAM

## 2023-11-06 PROCEDURE — 3008F BODY MASS INDEX DOCD: CPT | Mod: CPTII,S$GLB,, | Performed by: STUDENT IN AN ORGANIZED HEALTH CARE EDUCATION/TRAINING PROGRAM

## 2023-11-06 PROCEDURE — 3008F PR BODY MASS INDEX (BMI) DOCUMENTED: ICD-10-PCS | Mod: CPTII,S$GLB,, | Performed by: STUDENT IN AN ORGANIZED HEALTH CARE EDUCATION/TRAINING PROGRAM

## 2023-11-06 PROCEDURE — 99999 PR PBB SHADOW E&M-EST. PATIENT-LVL IV: CPT | Mod: PBBFAC,,, | Performed by: STUDENT IN AN ORGANIZED HEALTH CARE EDUCATION/TRAINING PROGRAM

## 2023-11-06 PROCEDURE — 3075F PR MOST RECENT SYSTOLIC BLOOD PRESS GE 130-139MM HG: ICD-10-PCS | Mod: CPTII,S$GLB,, | Performed by: STUDENT IN AN ORGANIZED HEALTH CARE EDUCATION/TRAINING PROGRAM

## 2023-11-06 PROCEDURE — 1159F MED LIST DOCD IN RCRD: CPT | Mod: CPTII,S$GLB,, | Performed by: STUDENT IN AN ORGANIZED HEALTH CARE EDUCATION/TRAINING PROGRAM

## 2023-11-06 PROCEDURE — 3079F PR MOST RECENT DIASTOLIC BLOOD PRESSURE 80-89 MM HG: ICD-10-PCS | Mod: CPTII,S$GLB,, | Performed by: STUDENT IN AN ORGANIZED HEALTH CARE EDUCATION/TRAINING PROGRAM

## 2023-11-06 PROCEDURE — 1160F RVW MEDS BY RX/DR IN RCRD: CPT | Mod: CPTII,S$GLB,, | Performed by: STUDENT IN AN ORGANIZED HEALTH CARE EDUCATION/TRAINING PROGRAM

## 2023-11-06 PROCEDURE — 3075F SYST BP GE 130 - 139MM HG: CPT | Mod: CPTII,S$GLB,, | Performed by: STUDENT IN AN ORGANIZED HEALTH CARE EDUCATION/TRAINING PROGRAM

## 2023-11-06 PROCEDURE — 3044F PR MOST RECENT HEMOGLOBIN A1C LEVEL <7.0%: ICD-10-PCS | Mod: CPTII,S$GLB,, | Performed by: STUDENT IN AN ORGANIZED HEALTH CARE EDUCATION/TRAINING PROGRAM

## 2023-11-06 PROCEDURE — 1159F PR MEDICATION LIST DOCUMENTED IN MEDICAL RECORD: ICD-10-PCS | Mod: CPTII,S$GLB,, | Performed by: STUDENT IN AN ORGANIZED HEALTH CARE EDUCATION/TRAINING PROGRAM

## 2023-11-06 PROCEDURE — 99214 OFFICE O/P EST MOD 30 MIN: CPT | Mod: S$GLB,,, | Performed by: STUDENT IN AN ORGANIZED HEALTH CARE EDUCATION/TRAINING PROGRAM

## 2023-11-06 PROCEDURE — 99214 PR OFFICE/OUTPT VISIT, EST, LEVL IV, 30-39 MIN: ICD-10-PCS | Mod: S$GLB,,, | Performed by: STUDENT IN AN ORGANIZED HEALTH CARE EDUCATION/TRAINING PROGRAM

## 2023-11-06 RX ORDER — IBUPROFEN 800 MG/1
800 TABLET ORAL 3 TIMES DAILY PRN
Qty: 30 TABLET | Refills: 0 | Status: SHIPPED | OUTPATIENT
Start: 2023-11-06 | End: 2023-12-29

## 2023-11-06 RX ORDER — SULFAMETHOXAZOLE AND TRIMETHOPRIM 800; 160 MG/1; MG/1
1 TABLET ORAL 2 TIMES DAILY
Qty: 20 TABLET | Refills: 0 | Status: SHIPPED | OUTPATIENT
Start: 2023-11-06 | End: 2023-11-16

## 2023-11-06 NOTE — PATIENT INSTRUCTIONS
Alternate ibuprofen/tylenol prn migraines.  Magnesium supplementation nightly  Ensure adequate sleep, hydration, and nutrition

## 2023-11-12 NOTE — PROGRESS NOTES
INTERNAL MEDICINE SAME DAY PRIMARY CARE VISIT NOTE    Subjective:     Chief Complaint: Headache and Abscess       Patient ID: Lesa Leyva is a 43 y.o. female, here today for focused same-day primary care visit.    Today, patient with complaint of skin lesion and migraine    Skin lesion: Area on L sided back which has been present for 2-3 weeks. Recently becoming larger and more uncomfortable. No fevers. No drainage.    Migraines:Recently having 2-3 headaches per week. Not taking any medication. No n/v. No vision changes or dizziness.No falls.    Past Medical History:  Past Medical History:   Diagnosis Date    Anxiety     Chronic left-sided low back pain without sciatica     Cystitis     Depression     H/O: hysterectomy 5/29/2019    Hydronephrosis     Insomnia     Nephrolithiasis 11/20/2018    Neuropathy of both feet     Neuropathy of hand     Painful bladder spasm     Primary osteoarthritis of left knee     Pyelonephritis     Ureteral stricture, left     Urinary tract infection        Home Medications:  Prior to Admission medications    Medication Sig Start Date End Date Taking? Authorizing Provider   duloxetine (CYMBALTA) 60 MG capsule Take 60 mg by mouth once daily. 10/10/14  Yes Provider, Historical   estradioL (ESTRACE) 0.5 MG tablet Take 0.5 mg by mouth once daily.   Yes Provider, Historical   lisdexamfetamine (VYVANSE) 20 MG capsule Take 1 capsule (20 mg total) by mouth every morning. 10/11/23  Yes Carmen Ugalde MD   traZODone (DESYREL) 100 MG tablet TAKE 1 TABLET BY MOUTH EVERY EVENING. 10/16/23  Yes Carmen Ugalde MD   HYDROcodone-acetaminophen (NORCO) 5-325 mg per tablet Take 1 tablet by mouth every 6 (six) hours as needed for Pain.  Patient not taking: Reported on 11/6/2023 8/14/23   [place]   ibuprofen (ADVIL,MOTRIN) 800 MG tablet Take 1 tablet (800 mg total) by mouth 3 (three) times daily as needed for Pain. 11/6/23   Shanna Foster MD   naproxen (NAPROSYN)  375 MG tablet Take 1 tablet (375 mg total) by mouth 2 (two) times daily as needed (for pain with food).  Patient not taking: Reported on 11/6/2023 8/14/23   Caryl Carmona PA-C   ondansetron (ZOFRAN-ODT) 4 MG TbDL Take 2 tablets (8 mg total) by mouth every 8 (eight) hours as needed.  Patient not taking: Reported on 11/6/2023 7/4/22   Caryl Carmona PA-C   pregabalin (LYRICA) 100 MG capsule TAKE 1 CAPSULE BY MOUTH ONCE DAILY.  Patient not taking: Reported on 11/6/2023 5/16/23   Carmen Ugalde MD   sulfamethoxazole-trimethoprim 800-160mg (BACTRIM DS) 800-160 mg Tab Take 1 tablet by mouth 2 (two) times daily. for 10 days 11/6/23 11/16/23  Shanna Foster MD   tamsulosin (FLOMAX) 0.4 mg Cap Take 1 capsule (0.4 mg total) by mouth every evening.  Patient not taking: Reported on 11/6/2023 12/8/22   Angel Hernandez MD   traMADoL (ULTRAM) 50 mg tablet Take 1 tablet (50 mg total) by mouth every 6 (six) hours.  Patient not taking: Reported on 11/6/2023 6/8/23   Carmen Ugalde MD       Allergies:  Review of patient's allergies indicates:   Allergen Reactions    Peaches [peach (prunus persica)] Hives, Itching and Swelling    Tramadol Other (See Comments)     headache       Social History:  Social History     Tobacco Use    Smoking status: Never    Smokeless tobacco: Never   Substance Use Topics    Alcohol use: Not Currently     Comment: 2x/month    Drug use: Never         Review of Systems   Constitutional:  Negative for diaphoresis, fatigue and fever.   HENT:  Negative for congestion, ear pain and voice change.    Eyes:  Negative for discharge, redness and itching.   Respiratory:  Negative for shortness of breath and wheezing.    Cardiovascular:  Negative for chest pain.   Gastrointestinal:  Negative for abdominal pain.   Musculoskeletal:  Negative for gait problem and joint swelling.   Skin:  Negative for color change, pallor, rash and wound.   Neurological:  Positive for headaches.  "Negative for weakness.           Objective:   /82 (BP Location: Left arm, Patient Position: Sitting, BP Method: Medium (Manual))   Pulse 96   Ht 5' 4" (1.626 m)   Wt 92 kg (202 lb 13.2 oz)   LMP 05/08/2015   SpO2 99%   BMI 34.81 kg/m²        General: AAO x3, no apparent distress  HEENT: PERRL, OP clear  CV: RRR, no m/r/g  Pulm: Lungs CTAB, no crackles, no wheezes  Abd: s/NT/ND +BS  Extremities: no c/c/e  Skin: L side back boil. No redness, no erythema    Labs:         Assessment/Plan     Lesa was seen today for headache and abscess.    Diagnoses and all orders for this visit:    Boil  -     sulfamethoxazole-trimethoprim 800-160mg (BACTRIM DS) 800-160 mg Tab; Take 1 tablet by mouth 2 (two) times daily. for 10 days  No sign of abscess on PE. Start bactrim ds bid    Migraine without status migrainosus, not intractable, unspecified migraine type  -     ibuprofen (ADVIL,MOTRIN) 800 MG tablet; Take 1 tablet (800 mg total) by mouth 3 (three) times daily as needed for Pain.  Alternate ibuprofen/tylenol prn migraines.  Magnesium supplementation nightly  Ensure adequate sleep, hydration, and nutrition        RTC prn and with PCP as per routine.    Shanna Foster MD  Department of Internal Medicine - [facility]        "

## 2023-11-14 DIAGNOSIS — F90.9 ATTENTION DEFICIT HYPERACTIVITY DISORDER (ADHD), UNSPECIFIED ADHD TYPE: ICD-10-CM

## 2023-11-15 RX ORDER — PREGABALIN 100 MG/1
CAPSULE ORAL
Qty: 90 CAPSULE | Refills: 3 | Status: SHIPPED | OUTPATIENT
Start: 2023-11-15 | End: 2024-02-16 | Stop reason: SDUPTHER

## 2023-11-15 RX ORDER — PREGABALIN 100 MG/1
100 CAPSULE ORAL DAILY
Qty: 90 CAPSULE | Refills: 3 | OUTPATIENT
Start: 2023-11-15

## 2023-11-15 RX ORDER — LISDEXAMFETAMINE DIMESYLATE CAPSULES 20 MG/1
20 CAPSULE ORAL EVERY MORNING
Qty: 30 CAPSULE | Refills: 0 | Status: SHIPPED | OUTPATIENT
Start: 2023-11-15 | End: 2023-12-11 | Stop reason: SDUPTHER

## 2023-11-15 NOTE — TELEPHONE ENCOUNTER
No care due was identified.  [facility] Embedded Care Due Messages. Reference number: [id number].   11/14/2023 7:23:50 PM CST

## 2023-11-15 NOTE — TELEPHONE ENCOUNTER
No care due was identified.  [facility] Embedded Care Due Messages. Reference number: [id number].   11/14/2023 7:25:35 PM CST  
Please see the attached refill request.  
no

## 2023-11-15 NOTE — TELEPHONE ENCOUNTER
No care due was identified.  [facility] Embedded Care Due Messages. Reference number: [id number].   11/14/2023 7:24:17 PM CST

## 2023-12-11 DIAGNOSIS — F90.9 ATTENTION DEFICIT HYPERACTIVITY DISORDER (ADHD), UNSPECIFIED ADHD TYPE: ICD-10-CM

## 2023-12-11 RX ORDER — LISDEXAMFETAMINE DIMESYLATE CAPSULES 20 MG/1
20 CAPSULE ORAL EVERY MORNING
Qty: 30 CAPSULE | Refills: 0 | Status: SHIPPED | OUTPATIENT
Start: 2023-12-11 | End: 2024-01-13 | Stop reason: SDUPTHER

## 2023-12-11 NOTE — TELEPHONE ENCOUNTER
No care due was identified.  [facility] Embedded Care Due Messages. Reference number: [id number].   12/11/2023 8:05:11 AM CST

## 2023-12-22 ENCOUNTER — PATIENT MESSAGE (OUTPATIENT)
Dept: PRIMARY CARE CLINIC | Facility: CLINIC | Age: 43
End: 2023-12-22
Payer: COMMERCIAL

## 2023-12-22 DIAGNOSIS — G43.909 MIGRAINE WITHOUT STATUS MIGRAINOSUS, NOT INTRACTABLE, UNSPECIFIED MIGRAINE TYPE: Primary | ICD-10-CM

## 2023-12-27 ENCOUNTER — TELEPHONE (OUTPATIENT)
Dept: NEUROLOGY | Facility: CLINIC | Age: 43
End: 2023-12-27
Payer: COMMERCIAL

## 2023-12-28 NOTE — PROGRESS NOTES
Name: Lesa Leyva  YOB: 1980  Today's Date:  12/29/2023        Subjective     Chief Complaint- Headaches      HPI:    Lesa Leyva is a 43 y.o. who presents to clinic for headache evaluation.     Headache history:   Age of onset -  Childhood, but got worse in September  Location - L occipital and can radiate to frontal   Nature of pain -  Throbbing  Prodrome - Denies  Aura -  Denies  Duration of headache - 5-6 hours  Time to peak intensity - 1 hour  Pain scale - range of intensity -  8/10  Frequency -  2-3 per week   Status Migrainosus history - Denies any headaches lasting longer than 72 hours  Time of day of most headaches- After Lunch, afternoon     Associated symptoms with the headache, bolded items are positive:   Meningeal symptoms - photophobia, phonophobia, exercise intolerance   Nausea/vomitting  Nasal drainage   Visual blurriness   Pallor/flushing  Dizziness   Vertigo  Confusion  Impaired concentration   Pain worsened with physical activity   Neck pain     Cluster headache symptoms, bolded items are positive:   Swollen or droopy eyelid  Swelling under or around the eye (may affect both eyes)  Excessive tearing  Red eye  Rhinorrhea or one-sided nasal congestion   Red, flushed face   Forehead and facial sweating    Symptoms of increased intracranial pressure, bolded items are positive:   Whooshing sounds   Visual spots/blotches     Basilar migraine symptoms, bolded items are positive:  Dysarthria  Vertigo  Tinnitus  Hypacusia  Diplopia  Simultaneous visual symptoms in both temporal and nasal fields of both eyes  Ataxia  Reduced level of consciousness  Bilateral paresthesias    Headache Triggers, bolded items are positive:  Bright or flickering light  Strong smell  Vigorous exercise  Dietary factors   Visual strain   Weather changes  Exertion  Heat (hot weather, hot baths or showers)  Menses      Treatment history:  Resolution of headache with sleep - yes   Meds  tried:  Amitriptyline  Lyrica  Trazodone  Cymbalta  Naproxen   Ibuprofen   Tylenol  Flexeril   Diclofenac  Toradol       Headache risk factors:   H/o TBI  - no   H/o Meningitis  - no   F/h Aneurysms  - no  Kidney Stones-yes      PAST MEDICAL HISTORY:  ----------------------------  Anxiety  Chronic left-sided low back pain without sciatica  Cystitis  Depression  H/O: hysterectomy  Hydronephrosis  Insomnia  Nephrolithiasis  Neuropathy of both feet  Neuropathy of hand  Painful bladder spasm  Primary osteoarthritis of left knee  Pyelonephritis  Ureteral stricture, left  Urinary tract infection      PAST SURGICAL HISTORY:  Past Surgical History:   Procedure Laterality Date    BLADDER FULGURATION N/A 5/31/2018    Procedure: FULGURATION-BLADDER;  Surgeon: Angel Hernandez MD;  [facility]: [facility];  Service: Urology;  Laterality: N/A;    BLADDER SUSPENSION  2013    bladder lifts    CYSTOSCOPY W/ URETERAL STENT PLACEMENT Left 3/2015    CYSTOSCOPY W/ URETERAL STENT REMOVAL Left 5/31/2018    Procedure: CYSTOSCOPY WITH STENT REMOVAL;  Surgeon: Angel Hernandez MD;  Location: [facility] OR;  Service: Urology;  Laterality: Left;    EXTRACORPOREAL SHOCK WAVE LITHOTRIPSY Right 9/6/2018    Procedure: LITHOTRIPSY, ESWL - Right distal ureteral calculus;  Surgeon: Angel Hernandez MD;  Location: [facility] OR;  Service: Urology;  Laterality: Right;    EXTRACORPOREAL SHOCK WAVE LITHOTRIPSY Right 1/16/2020    Procedure: LITHOTRIPSY, ESWL;  Surgeon: Angel Hernandez MD;  Location: [facility] OR;  Service: Urology;  Laterality: Right;    EXTRACORPOREAL SHOCK WAVE LITHOTRIPSY Left 12/8/2022    Procedure: LITHOTRIPSY, ESWL;  Surgeon: Angel Hernandez MD;  Location: [facility] OR;  Service: Urology;  Laterality: Left;  ESWL on left renal calculs    HYSTERECTOMY      Partial - still has ovaries    KNEE SURGERY Left     lithotripsy  4/2012    OVARIAN CYST SURGERY  2006    RETROGRADE PYELOGRAPHY Left 5/31/2018    Procedure: PYELOGRAM, RETROGRADE;  Surgeon: Angel DRAKE  MD David;  Location: [facility] OR;  Service: Urology;  Laterality: Left;    TOTAL KNEE ARTHROPLASTY Left 5/24/2021    Procedure: ARTHROPLASTY, KNEE, TOTAL monoblock;  Surgeon: Ralf Wilkinson MD;  Location: [facility] OR;  Service: Orthopedics;  Laterality: Left;  Odilon tavera/ Braydon confirmed 5/21/21 [place]           CURRENT MEDS:    Current Outpatient Medications:     duloxetine (CYMBALTA) 60 MG capsule, Take 60 mg by mouth once daily., Disp: , Rfl: 6    fremanezumab-vfrm (AJOVY) 225 mg/1.5 mL injection, Inject 1.5 mLs (225 mg total) into the skin every 28 days., Disp: 1.5 mL, Rfl: 6    lisdexamfetamine (VYVANSE) 20 MG capsule, Take 1 capsule (20 mg total) by mouth every morning., Disp: 30 capsule, Rfl: 0    pregabalin (LYRICA) 100 MG capsule, TAKE 1 CAPSULE BY MOUTH ONCE DAILY, Disp: 90 capsule, Rfl: 3    sumatriptan (IMITREX) 50 MG tablet, Take 1 tablet (50 mg total) by mouth as needed for Migraine. Do not exceed 200mg (4 tablets) in 24 hour period, Disp: 9 tablet, Rfl: 11    traZODone (DESYREL) 100 MG tablet, TAKE 1 TABLET BY MOUTH EVERY EVENING., Disp: 90 tablet, Rfl: 3        ALLERGIES:  Review of patient's allergies indicates:   Allergen Reactions    Peaches [peach (prunus persica)] Hives, Itching and Swelling    Tramadol Other (See Comments)     headache        FAMILY HISTORY:  Family History   Problem Relation Age of Onset    Hypertension Mother     Seizures Mother     Fibromyalgia Mother     Arthritis Mother     Cancer Mother     Depression Mother     Hypertension Father     No Known Problems Sister     No Known Problems Sister     No Known Problems Sister     Breast cancer Maternal Grandmother     COPD Maternal Grandfather     Seizures Brother     Seizures Brother     Cancer Neg Hx     Heart disease Neg Hx     Kidney disease Neg Hx          Review of Systems:  Review of Systems   Constitutional:  Negative for chills, fever and weight loss.   HENT:  Negative for ear discharge, ear pain, hearing loss, sinus pain, sore throat  and tinnitus.    Eyes:  Negative for blurred vision, double vision and photophobia.   Respiratory:  Negative for cough, shortness of breath and wheezing.    Cardiovascular:  Negative for chest pain, palpitations and orthopnea.   Gastrointestinal:  Negative for constipation, diarrhea, nausea and vomiting.   Genitourinary:  Negative for dysuria, frequency and urgency.   Musculoskeletal:  Negative for back pain, myalgias and neck pain.   Neurological:  Positive for headaches. Negative for tingling, seizures, loss of consciousness and weakness.           Objective   Vitals:    12/29/23 0806   BP: 116/81   Pulse: 100          NEUROLOGICAL EXAMINATION:     MENTAL STATUS   Oriented to person, place, and time.   Speech: speech is normal   Level of consciousness: alert    CRANIAL NERVES     CN III, IV, VI   Pupils are equal, round, and reactive to light.  Extraocular motions are normal.     CN V   Facial sensation intact.     CN VII   Facial expression full, symmetric.     CN VIII   CN VIII normal.     CN IX, X   CN IX normal.   CN X normal.     CN XI   CN XI normal.     CN XII   CN XII normal.     MOTOR EXAM   Muscle bulk: normal    Strength   Strength 5/5 throughout.             Latest Imaging: None        Assessment     Lesa Leyva is a 43 y.o. who presented to clinic today for the evaluation of headaches.    Plan     My approach to every patient is multimodal.   I focused our discussion on addressing modifiable risk factors and trying to decrease them.  After discussion with the patient, I recommend the following:     Preventive medications; these medications have to be taken every single day to decrease headache frequency and severity; it takes at least minimum of few weeks to see any results; and have to be taken for at least 1 to 2 years. The medication should be started at a small dose, and increased if no significant side effects. Patient compliance is key for effectiveness of the preventive medications. (we  discussed the options of beta-blockers, calcium channel blockers, SSRIs, SNRIs, neuron modulating like topiramate options)     Failed:   Lyrica  Trazodone  Amitriptyline  Cymbalta   Flexeril      START:   Ajovy 1.5mL [id number]       Patient has tried and failed 2 or more first line medication management options for prevention of migraine; therefore it is medically necessary that they take CGRP antagonist for prevention of migraine.     Due to patient concurrent use of SSRI cannot tolerate additional SSRI/SNRI for prevention of headache due to risk of serotonin syndrome     Due to known history of kidney stones patient is not a candidate for topamax due to black box warning for medication.       Acute (abortive) medications- these medications are to be taken only at the onset of severe headache and please limit a total of any combination of these medications to less than 6 days per month on average because they can cause rebound (medication overuse) headaches.     Failed:   Tylenol   Ibuprofen  Diclofenac  Toradol     START:   Imitrex 50mg PRN     Natural approaches to increasing brain energy and serotonin:   Magnesium 400 mg daily  Vitamin B2 400 mg daily   Vitamin B12 1000 mcg daily      DIET: I recommend a diet that heavily favors fruits and vegetables while reducing carbs. More information is available upon request.     EXERCISE: Gentle movement exercises, concentrate on combination of muscle building and aerobic. I also recommend adding gentle Yoga therapy. The goal is 150 minutes per week of moderate to rigorous exercise, but you should start at your own pace and progress slowly and safely as discussed today.    ANXIETY/STRESS- Control stressors with yoga, meditation, counseling, or other methods of mindfulness.     SLEEP- aim for 7-8 hrs of restful sleep per night.     FUN- Increase fun time spend with friends and family     Benefits, side effects, and alternatives were discussed extensively with the patient.?      Rebecca verbally endorsed understanding of all the recommendations.?     Follow Up in 3 months or sooner if needed     Diagnoses and all orders for this visit:    Chronic migraine without aura without status migrainosus, not intractable  -     Ambulatory referral/consult to Neurology  -     fremanezumab-vfrm (AJOVY) 225 mg/1.5 mL injection; Inject 1.5 mLs (225 mg total) into the skin every 28 days.  -     sumatriptan (IMITREX) 50 MG tablet; Take 1 tablet (50 mg total) by mouth as needed for Migraine. Do not exceed 200mg (4 tablets) in 24 hour period    Other hyperlipidemia    Nephrolithiasis          I spent a total of 45 minutes on the day of the visit. This includes face to face time and non-face to face time preparing to see the patient (eg, review of tests), obtaining and/or reviewing separately obtained history, documenting clinical information in the electronic or other health record, independently interpreting results and communicating results to the patient/family/caregiver, or care coordinator.      Haydee Lemus PA-C  Supervising Physician Kishan Tierney MD was avalible for all questions during this exam.  [facility]

## 2023-12-29 ENCOUNTER — OFFICE VISIT (OUTPATIENT)
Dept: NEUROLOGY | Facility: CLINIC | Age: 43
End: 2023-12-29
Payer: COMMERCIAL

## 2023-12-29 VITALS
WEIGHT: 198.94 LBS | HEART RATE: 100 BPM | SYSTOLIC BLOOD PRESSURE: 116 MMHG | DIASTOLIC BLOOD PRESSURE: 81 MMHG | HEIGHT: 64 IN | BODY MASS INDEX: 33.96 KG/M2

## 2023-12-29 DIAGNOSIS — G43.709 CHRONIC MIGRAINE WITHOUT AURA WITHOUT STATUS MIGRAINOSUS, NOT INTRACTABLE: Primary | ICD-10-CM

## 2023-12-29 DIAGNOSIS — E78.49 OTHER HYPERLIPIDEMIA: ICD-10-CM

## 2023-12-29 DIAGNOSIS — N20.0 NEPHROLITHIASIS: ICD-10-CM

## 2023-12-29 PROCEDURE — 1160F RVW MEDS BY RX/DR IN RCRD: CPT | Mod: CPTII,S$GLB,, | Performed by: PHYSICIAN ASSISTANT

## 2023-12-29 PROCEDURE — 1160F PR REVIEW ALL MEDS BY PRESCRIBER/CLIN PHARMACIST DOCUMENTED: ICD-10-PCS | Mod: CPTII,S$GLB,, | Performed by: PHYSICIAN ASSISTANT

## 2023-12-29 PROCEDURE — 99215 OFFICE O/P EST HI 40 MIN: CPT | Mod: S$GLB,,, | Performed by: PHYSICIAN ASSISTANT

## 2023-12-29 PROCEDURE — 99215 PR OFFICE/OUTPT VISIT, EST, LEVL V, 40-54 MIN: ICD-10-PCS | Mod: S$GLB,,, | Performed by: PHYSICIAN ASSISTANT

## 2023-12-29 PROCEDURE — 3079F PR MOST RECENT DIASTOLIC BLOOD PRESSURE 80-89 MM HG: ICD-10-PCS | Mod: CPTII,S$GLB,, | Performed by: PHYSICIAN ASSISTANT

## 2023-12-29 PROCEDURE — 3074F SYST BP LT 130 MM HG: CPT | Mod: CPTII,S$GLB,, | Performed by: PHYSICIAN ASSISTANT

## 2023-12-29 PROCEDURE — 3079F DIAST BP 80-89 MM HG: CPT | Mod: CPTII,S$GLB,, | Performed by: PHYSICIAN ASSISTANT

## 2023-12-29 PROCEDURE — 1159F MED LIST DOCD IN RCRD: CPT | Mod: CPTII,S$GLB,, | Performed by: PHYSICIAN ASSISTANT

## 2023-12-29 PROCEDURE — 3074F PR MOST RECENT SYSTOLIC BLOOD PRESSURE < 130 MM HG: ICD-10-PCS | Mod: CPTII,S$GLB,, | Performed by: PHYSICIAN ASSISTANT

## 2023-12-29 PROCEDURE — 3044F PR MOST RECENT HEMOGLOBIN A1C LEVEL <7.0%: ICD-10-PCS | Mod: CPTII,S$GLB,, | Performed by: PHYSICIAN ASSISTANT

## 2023-12-29 PROCEDURE — 99999 PR PBB SHADOW E&M-EST. PATIENT-LVL IV: CPT | Mod: PBBFAC,,, | Performed by: PHYSICIAN ASSISTANT

## 2023-12-29 PROCEDURE — 3008F BODY MASS INDEX DOCD: CPT | Mod: CPTII,S$GLB,, | Performed by: PHYSICIAN ASSISTANT

## 2023-12-29 PROCEDURE — 99999 PR PBB SHADOW E&M-EST. PATIENT-LVL IV: ICD-10-PCS | Mod: PBBFAC,,, | Performed by: PHYSICIAN ASSISTANT

## 2023-12-29 PROCEDURE — 3008F PR BODY MASS INDEX (BMI) DOCUMENTED: ICD-10-PCS | Mod: CPTII,S$GLB,, | Performed by: PHYSICIAN ASSISTANT

## 2023-12-29 PROCEDURE — 3044F HG A1C LEVEL LT 7.0%: CPT | Mod: CPTII,S$GLB,, | Performed by: PHYSICIAN ASSISTANT

## 2023-12-29 PROCEDURE — 1159F PR MEDICATION LIST DOCUMENTED IN MEDICAL RECORD: ICD-10-PCS | Mod: CPTII,S$GLB,, | Performed by: PHYSICIAN ASSISTANT

## 2023-12-29 RX ORDER — SUMATRIPTAN 50 MG/1
50 TABLET, FILM COATED ORAL
Qty: 9 TABLET | Refills: 11 | Status: ACTIVE | OUTPATIENT
Start: 2023-12-29 | End: 2024-03-21

## 2023-12-29 NOTE — PATIENT INSTRUCTIONS
Start Ajovy monthly injection every 28 days, sent to [facility] they will ship this to you    Imitrex- acute relief medication take at the onset of migraine and may repeat dose in 2 hours if you have not had relief, please let me know if this makes you sleepy or you have any adverse effects and we can change     Supplements for Migraine Prevention     There are several supplements which have been shown in clinical studies to be used for migraine prevention:    Magnesium 400mg daily    Riboflavin (Vitamin B2) 400mg daily    CoQ10 100mg three times daily

## 2023-12-29 NOTE — ASSESSMENT & PLAN NOTE
Known history of kidney stones therefore the patient is not a candidate for topamax therapy for migraine relief due to black box warning.

## 2024-01-13 DIAGNOSIS — F90.9 ATTENTION DEFICIT HYPERACTIVITY DISORDER (ADHD), UNSPECIFIED ADHD TYPE: ICD-10-CM

## 2024-01-13 NOTE — TELEPHONE ENCOUNTER
No care due was identified.  [facility] Embedded Care Due Messages. Reference number: [id number].   1/13/2024 11:47:57 AM CST

## 2024-01-16 RX ORDER — LISDEXAMFETAMINE DIMESYLATE CAPSULES 20 MG/1
20 CAPSULE ORAL EVERY MORNING
Qty: 30 CAPSULE | Refills: 0 | Status: SHIPPED | OUTPATIENT
Start: 2024-01-16 | End: 2024-02-12 | Stop reason: SDUPTHER

## 2024-02-12 DIAGNOSIS — F90.9 ATTENTION DEFICIT HYPERACTIVITY DISORDER (ADHD), UNSPECIFIED ADHD TYPE: ICD-10-CM

## 2024-02-12 RX ORDER — LISDEXAMFETAMINE DIMESYLATE CAPSULES 20 MG/1
20 CAPSULE ORAL EVERY MORNING
Qty: 30 CAPSULE | Refills: 0 | Status: SHIPPED | OUTPATIENT
Start: 2024-02-12 | End: 2024-03-21 | Stop reason: SDUPTHER

## 2024-02-12 NOTE — TELEPHONE ENCOUNTER
No care due was identified.  Health [facility] Embedded Care Due Messages. Reference number: [id number].   2/12/2024 1:13:39 PM CST

## 2024-02-14 DIAGNOSIS — Z12.31 ENCOUNTER FOR SCREENING MAMMOGRAM FOR MALIGNANT NEOPLASM OF BREAST: Primary | ICD-10-CM

## 2024-02-16 RX ORDER — PREGABALIN 100 MG/1
100 CAPSULE ORAL DAILY
Qty: 90 CAPSULE | Refills: 3 | Status: SHIPPED | OUTPATIENT
Start: 2024-02-16

## 2024-02-16 NOTE — TELEPHONE ENCOUNTER
No care due was identified.  Health [facility] Embedded Care Due Messages. Reference number: [id number].   2/16/2024 1:03:55 PM CST

## 2024-03-06 ENCOUNTER — E-VISIT (OUTPATIENT)
Dept: PRIMARY CARE CLINIC | Facility: CLINIC | Age: 44
End: 2024-03-06
Payer: COMMERCIAL

## 2024-03-06 DIAGNOSIS — J32.9 SINUSITIS, UNSPECIFIED CHRONICITY, UNSPECIFIED LOCATION: Primary | ICD-10-CM

## 2024-03-06 PROCEDURE — 99421 OL DIG E/M SVC 5-10 MIN: CPT | Mod: ,,, | Performed by: INTERNAL MEDICINE

## 2024-03-06 RX ORDER — AZITHROMYCIN 250 MG/1
TABLET, FILM COATED ORAL
Qty: 6 TABLET | Refills: 0 | Status: SHIPPED | OUTPATIENT
Start: 2024-03-06 | End: 2024-03-11

## 2024-03-06 NOTE — PROGRESS NOTES
Patient ID: Lesa Leyva is a 44 y.o. female.    Chief Complaint: URI (Entered automatically based on patient selection in Patient Portal.)    The patient initiated a request through [facility] on 3/6/2024 for evaluation and management with a chief complaint of URI (Entered automatically based on patient selection in Patient Portal.)     I evaluated the questionnaire submission on 3/6/2024.    [facility] Peq Evisit Upper Respitatory/Cough Questionnaire    3/6/2024  5:35 AM CST - Filed by Patient   Do you agree to participate in an E-Visit? Yes   If you have any of the following symptoms, please present to your local ER or call 911:  I acknowledge   What is the main issue that you would like for your doctor to address today? Coughing   Are you able to take your vital signs? No   Are you currently pregnant, could you be pregnant, or are you breast feeding? None of the above   What symptoms do you currently have?  Cough;  Fatigue;  Nasal Congestion   Describe your cough: Productive (containing mucus)   Describe the mucus: Green;  Thick   Have you ever smoked? I have never smoked   Have you had a fever? No   When did your symptoms first appear? 2/29/2024   In the last two weeks, have you been in close contact with someone who has COVID-19 or the Flu? No   In the last two weeks, have you worked or volunteered in a healthcare facility or as a ? Healthcare facilities include a hospital, medical or dental clinic, long-term care facility, or nursing home No   Do you live in a long-term care facility, nursing home, group home, or homeless shelter? No   List what you have done or taken to help your symptoms. Mucinex, Robitussin, cold, and flu medication   How severe are your symptoms? Moderate   Have your symptoms improved since they first appeared? No change   Have you taken an at home Covid test? Yes   What were the results? Negative   Have you taken a Flu test? No   Have you been fully vaccinated for COVID? (2  Pfizer, 2 Moderna or 1 Julio & Julio vaccine injections) Yes   Have you received a booster? No   Have you recieved a Flu shot? Yes   When did you recieve your Flu shot? 10/17/2023   Do you have transportation to get tested for COVID if it is indicated and ordered for you at an [facility] location? Yes   Provide any information you feel is important to your history not asked above    Please attach any relevant images or files          Encounter Diagnosis   Name Primary?    Sinusitis, unspecified chronicity, unspecified location Yes        No orders of the defined types were placed in this encounter.     Medications Ordered This Encounter   Medications    azithromycin (Z-GAVIOTA) 250 MG tablet     Sig: Take 2 tablets by mouth on day 1; Take 1 tablet by mouth on days 2-5     Dispense:  6 tablet     Refill:  0        No follow-ups on file.      E-Visit Time Tracking:    Day 1 Time (in minutes): 5    Total Time (in minutes): 5

## 2024-03-21 ENCOUNTER — OFFICE VISIT (OUTPATIENT)
Dept: NEUROLOGY | Facility: CLINIC | Age: 44
End: 2024-03-21
Payer: COMMERCIAL

## 2024-03-21 DIAGNOSIS — E66.01 SEVERE OBESITY (BMI 35.0-39.9) WITH COMORBIDITY: ICD-10-CM

## 2024-03-21 DIAGNOSIS — G43.709 CHRONIC MIGRAINE WITHOUT AURA WITHOUT STATUS MIGRAINOSUS, NOT INTRACTABLE: Primary | ICD-10-CM

## 2024-03-21 DIAGNOSIS — F90.9 ATTENTION DEFICIT HYPERACTIVITY DISORDER (ADHD), UNSPECIFIED ADHD TYPE: ICD-10-CM

## 2024-03-21 PROCEDURE — 1159F MED LIST DOCD IN RCRD: CPT | Mod: CPTII,95,, | Performed by: PHYSICIAN ASSISTANT

## 2024-03-21 PROCEDURE — 99212 OFFICE O/P EST SF 10 MIN: CPT | Mod: 95,,, | Performed by: PHYSICIAN ASSISTANT

## 2024-03-21 PROCEDURE — 1160F RVW MEDS BY RX/DR IN RCRD: CPT | Mod: CPTII,95,, | Performed by: PHYSICIAN ASSISTANT

## 2024-03-21 RX ORDER — LISDEXAMFETAMINE DIMESYLATE CAPSULES 20 MG/1
20 CAPSULE ORAL EVERY MORNING
Qty: 30 CAPSULE | Refills: 0 | Status: SHIPPED | OUTPATIENT
Start: 2024-03-21 | End: 2024-04-29 | Stop reason: SDUPTHER

## 2024-03-21 RX ORDER — UBROGEPANT 100 MG/1
100 TABLET ORAL
Qty: 10 TABLET | Refills: 11 | Status: ACTIVE | OUTPATIENT
Start: 2024-03-21

## 2024-03-21 NOTE — TELEPHONE ENCOUNTER
No care due was identified.  [facility] Embedded Care Due Messages. Reference number: [id number].   3/21/2024 11:23:24 AM CDT

## 2024-03-21 NOTE — PROGRESS NOTES
Name: Lesa Leyva  YOB: 1980  Today's Date:  03/21/2024        Subjective     Chief Complaint- Headaches    Interval History 03/21/2024    The patient location is: home  The chief complaint leading to consultation is: Headaches  Visit type: Virtual visit with audio  and video  Total time spent with patient: 5 min        Each patient to whom I provide medical services by telemedicine is:  (1) informed of the relationship between the physician and patient and the respective role of any other health care provider with respect to management of the patient; and (2) notified that they may decline to receive medical services by telemedicine and may withdraw from such care at any time. Patient verbally consented to receive this service via voice-only telephone call.       I have reviewed all relevant history in Epic. The patient presents for routine follow up regarding headaches, she is currently managed with Ajovy [id number] and Imitrex 50mg PRN. She notes that since previous visit in December she has had about 3 headaches. She notes imitrex does cause her to have significant fatigue.           HPI:    Lesa Leyva is a 44 y.o. who presents to clinic for headache evaluation.     Headache history:   Age of onset -  Childhood, but got worse in September  Location - L occipital and can radiate to frontal   Nature of pain -  Throbbing  Prodrome - Denies  Aura -  Denies  Duration of headache - 5-6 hours  Time to peak intensity - 1 hour  Pain scale - range of intensity -  8/10  Frequency -  2-3 per week   Status Migrainosus history - Denies any headaches lasting longer than 72 hours  Time of day of most headaches- After Lunch, afternoon     Associated symptoms with the headache, bolded items are positive:   Meningeal symptoms - photophobia, phonophobia, exercise intolerance   Nausea/vomitting  Nasal drainage   Visual blurriness   Pallor/flushing  Dizziness   Vertigo  Confusion  Impaired concentration   Pain  worsened with physical activity   Neck pain     Cluster headache symptoms, bolded items are positive:   Swollen or droopy eyelid  Swelling under or around the eye (may affect both eyes)  Excessive tearing  Red eye  Rhinorrhea or one-sided nasal congestion   Red, flushed face   Forehead and facial sweating    Symptoms of increased intracranial pressure, bolded items are positive:   Whooshing sounds   Visual spots/blotches     Basilar migraine symptoms, bolded items are positive:  Dysarthria  Vertigo  Tinnitus  Hypacusia  Diplopia  Simultaneous visual symptoms in both temporal and nasal fields of both eyes  Ataxia  Reduced level of consciousness  Bilateral paresthesias    Headache Triggers, bolded items are positive:  Bright or flickering light  Strong smell  Vigorous exercise  Dietary factors   Visual strain   Weather changes  Exertion  Heat (hot weather, hot baths or showers)  Menses      Treatment history:  Resolution of headache with sleep - yes   Meds tried:  Amitriptyline  Lyrica  Trazodone  Cymbalta  Naproxen   Ibuprofen   Tylenol  Flexeril   Diclofenac  Toradol       Headache risk factors:   H/o TBI  - no   H/o Meningitis  - no   F/h Aneurysms  - no  Kidney Stones-yes      PAST MEDICAL HISTORY:  ----------------------------  Anxiety  Chronic left-sided low back pain without sciatica  Cystitis  Depression  H/O: hysterectomy  Hydronephrosis  Insomnia  Nephrolithiasis  Neuropathy of both feet  Neuropathy of hand  Painful bladder spasm  Primary osteoarthritis of left knee  Pyelonephritis  Ureteral stricture, left  Urinary tract infection      PAST SURGICAL HISTORY:  Past Surgical History:   Procedure Laterality Date    BLADDER FULGURATION N/A 5/31/2018    Procedure: FULGURATION-BLADDER;  Surgeon: Angel Hernandez MD;  Location: [facility];  Service: Urology;  Laterality: N/A;    BLADDER SUSPENSION  2013    bladder lifts    CYSTOSCOPY W/ URETERAL STENT PLACEMENT Left 3/2015    CYSTOSCOPY W/ URETERAL STENT REMOVAL Left  5/31/2018    Procedure: CYSTOSCOPY WITH STENT REMOVAL;  Surgeon: Angel Hernandez MD;  Location: [facility] OR;  Service: Urology;  Laterality: Left;    EXTRACORPOREAL SHOCK WAVE LITHOTRIPSY Right 9/6/2018    Procedure: LITHOTRIPSY, ESWL - Right distal ureteral calculus;  Surgeon: Angel Hernandez MD;  Location: [facility] OR;  Service: Urology;  Laterality: Right;    EXTRACORPOREAL SHOCK WAVE LITHOTRIPSY Right 1/16/2020    Procedure: LITHOTRIPSY, ESWL;  Surgeon: Angel Hernandez MD;  Location: [facility] OR;  Service: Urology;  Laterality: Right;    EXTRACORPOREAL SHOCK WAVE LITHOTRIPSY Left 12/8/2022    Procedure: LITHOTRIPSY, ESWL;  Surgeon: Angel Hernandez MD;  Location: [facility] OR;  Service: Urology;  Laterality: Left;  ESWL on left renal calculs    HYSTERECTOMY      Partial - still has ovaries    KNEE SURGERY Left     lithotripsy  4/2012    OVARIAN CYST SURGERY  2006    RETROGRADE PYELOGRAPHY Left 5/31/2018    Procedure: PYELOGRAM, RETROGRADE;  Surgeon: Angel Hernandez MD;  Location: [facility] OR;  Service: Urology;  Laterality: Left;    TOTAL KNEE ARTHROPLASTY Left 5/24/2021    Procedure: ARTHROPLASTY, KNEE, TOTAL monoblock;  Surgeon: Ralf Wilkinson MD;  Location: [facility] OR;  Service: Orthopedics;  Laterality: Left;  Odilon tavera/ Braydon confirmed 5/21/21 [place]           CURRENT MEDS:    Current Outpatient Medications:     duloxetine (CYMBALTA) 60 MG capsule, Take 60 mg by mouth once daily., Disp: , Rfl: 6    estradioL (ESTRACE) 0.5 MG tablet, Take 1 tablet by mouth daily, Disp: 90 tablet, Rfl: 3    fremanezumab-vfrm (AJOVY) 225 mg/1.5 mL injection, Inject 1.5 mLs (225 mg total) into the skin every 28 days., Disp: 1.5 mL, Rfl: 6    lisdexamfetamine (VYVANSE) 20 MG capsule, Take 1 capsule (20 mg total) by mouth every morning., Disp: 30 capsule, Rfl: 0    pregabalin (LYRICA) 100 MG capsule, Take 1 capsule (100 mg total) by mouth once daily., Disp: 90 capsule, Rfl: 3    traZODone (DESYREL) 100 MG tablet, TAKE 1 TABLET BY MOUTH EVERY EVENING., Disp:  90 tablet, Rfl: 3    ubrogepant (UBRELVY) 100 mg tablet, Take 1 tablet (100 mg total) by mouth as needed for Migraine. If symptoms persist or return, may repeat dose after 2 hours. Maximum: 200 mg per 24 hours, Disp: 10 tablet, Rfl: 11        ALLERGIES:  Review of patient's allergies indicates:   Allergen Reactions    Peaches [peach (prunus persica)] Hives, Itching and Swelling    Tramadol Other (See Comments)     headache        FAMILY HISTORY:  Family History   Problem Relation Age of Onset    Hypertension Mother     Seizures Mother     Fibromyalgia Mother     Arthritis Mother     Cancer Mother     Depression Mother     Hypertension Father     No Known Problems Sister     No Known Problems Sister     No Known Problems Sister     Breast cancer Maternal Grandmother     COPD Maternal Grandfather     Seizures Brother     Seizures Brother     Cancer Neg Hx     Heart disease Neg Hx     Kidney disease Neg Hx          Review of Systems:  Review of Systems   Constitutional:  Negative for chills, fever and weight loss.   HENT:  Negative for ear discharge, ear pain, hearing loss, sinus pain, sore throat and tinnitus.    Eyes:  Negative for blurred vision, double vision and photophobia.   Respiratory:  Negative for cough, shortness of breath and wheezing.    Cardiovascular:  Negative for chest pain, palpitations and orthopnea.   Gastrointestinal:  Negative for constipation, diarrhea, nausea and vomiting.   Genitourinary:  Negative for dysuria, frequency and urgency.   Musculoskeletal:  Negative for back pain, myalgias and neck pain.   Neurological:  Positive for headaches. Negative for tingling, seizures, loss of consciousness and weakness.           Objective   There were no vitals filed for this visit.         NEUROLOGICAL EXAMINATION:     MENTAL STATUS   Oriented to person, place, and time.   Speech: speech is normal   Level of consciousness: alert    CRANIAL NERVES     CN VII   Facial expression full, symmetric.     CN  VIII   CN VIII normal.     REFLEXES        Limited 2/2 Virtual Visit             Latest Imaging: None        Assessment     Lesa Leyva is a 44 y.o. who presented to clinic today for the evaluation of headaches.    Plan     My approach to every patient is multimodal.   I focused our discussion on addressing modifiable risk factors and trying to decrease them.  After discussion with the patient, I recommend the following:     Preventive medications; these medications have to be taken every single day to decrease headache frequency and severity; it takes at least minimum of few weeks to see any results; and have to be taken for at least 1 to 2 years. The medication should be started at a small dose, and increased if no significant side effects. Patient compliance is key for effectiveness of the preventive medications. (we discussed the options of beta-blockers, calcium channel blockers, SSRIs, SNRIs, neuron modulating like topiramate options)     Failed:   Lyrica  Trazodone  Amitriptyline  Cymbalta   Flexeril      START:   Ajovy 1.5mL [id number]       Patient has tried and failed 2 or more first line medication management options for prevention of migraine; therefore it is medically necessary that they take CGRP antagonist for prevention of migraine.     Due to patient concurrent use of SSRI cannot tolerate additional SSRI/SNRI for prevention of headache due to risk of serotonin syndrome     Due to known history of kidney stones patient is not a candidate for topamax due to black box warning for medication.       Acute (abortive) medications- these medications are to be taken only at the onset of severe headache and please limit a total of any combination of these medications to less than 6 days per month on average because they can cause rebound (medication overuse) headaches.     Failed:   Tylenol   Ibuprofen  Diclofenac  Toradol   Sumatriptan     START:   Ubrelvy 100mg PRN     Natural approaches to increasing  brain energy and serotonin:   Magnesium 400 mg daily  Vitamin B2 400 mg daily   Vitamin B12 1000 mcg daily      DIET: I recommend a diet that heavily favors fruits and vegetables while reducing carbs. More information is available upon request.     EXERCISE: Gentle movement exercises, concentrate on combination of muscle building and aerobic. I also recommend adding gentle Yoga therapy. The goal is 150 minutes per week of moderate to rigorous exercise, but you should start at your own pace and progress slowly and safely as discussed today.    ANXIETY/STRESS- Control stressors with yoga, meditation, counseling, or other methods of mindfulness.     SLEEP- aim for 7-8 hrs of restful sleep per night.     FUN- Increase fun time spend with friends and family     Benefits, side effects, and alternatives were discussed extensively with the patient.?     Rebecca verbally endorsed understanding of all the recommendations.?     Follow Up in 3 months or sooner if needed     Diagnoses and all orders for this visit:    Chronic migraine without aura without status migrainosus, not intractable  -     ubrogepant (UBRELVY) 100 mg tablet; Take 1 tablet (100 mg total) by mouth as needed for Migraine. If symptoms persist or return, may repeat dose after 2 hours. Maximum: 200 mg per 24 hours    Severe obesity (BMI 35.0-39.9) with comorbidity            I spent a total of 15 minutes on the day of the visit. This includes face to face time and non-face to face time preparing to see the patient (eg, review of tests), obtaining and/or reviewing separately obtained history, documenting clinical information in the electronic or other health record, independently interpreting results and communicating results to the patient/family/caregiver, or care coordinator.      Haydee Lemus PA-C  Supervising Physician Kishan Tierney MD was avalible for all questions during this exam.  [facility]

## 2024-04-16 ENCOUNTER — PATIENT MESSAGE (OUTPATIENT)
Dept: PRIMARY CARE CLINIC | Facility: CLINIC | Age: 44
End: 2024-04-16
Payer: COMMERCIAL

## 2024-04-17 ENCOUNTER — OFFICE VISIT (OUTPATIENT)
Dept: PRIMARY CARE CLINIC | Facility: CLINIC | Age: 44
End: 2024-04-17
Payer: COMMERCIAL

## 2024-04-17 DIAGNOSIS — F32.A ANXIETY AND DEPRESSION: Primary | ICD-10-CM

## 2024-04-17 DIAGNOSIS — F41.9 ANXIETY AND DEPRESSION: Primary | ICD-10-CM

## 2024-04-17 PROCEDURE — 1160F RVW MEDS BY RX/DR IN RCRD: CPT | Mod: CPTII,95,, | Performed by: INTERNAL MEDICINE

## 2024-04-17 PROCEDURE — 99214 OFFICE O/P EST MOD 30 MIN: CPT | Mod: 95,,, | Performed by: INTERNAL MEDICINE

## 2024-04-17 PROCEDURE — 1159F MED LIST DOCD IN RCRD: CPT | Mod: CPTII,95,, | Performed by: INTERNAL MEDICINE

## 2024-04-17 RX ORDER — ALPRAZOLAM 0.25 MG/1
0.25 TABLET ORAL 3 TIMES DAILY
Qty: 30 TABLET | Refills: 0 | Status: SHIPPED | OUTPATIENT
Start: 2024-04-17 | End: 2024-05-17

## 2024-04-17 RX ORDER — DULOXETIN HYDROCHLORIDE 30 MG/1
30 CAPSULE, DELAYED RELEASE ORAL DAILY
Qty: 30 CAPSULE | Refills: 11 | Status: SHIPPED | OUTPATIENT
Start: 2024-04-17 | End: 2025-04-17

## 2024-04-17 NOTE — ASSESSMENT & PLAN NOTE
On cymbalta 60 mg daily.  No SI/HI. Anxiety got worse 3 weeks ago, has had 2 panic attacks in the past 3 weeks, had to walk away.  Having some increased chest pressure daily. Feels irritable/snappy. Feels like she can't complete.  Has some anxious moments but nothing overwhelming.

## 2024-04-17 NOTE — PROGRESS NOTES
The patient location is: home  The chief complaint leading to consultation is: anxiety    Visit type: audiovisual    Face to Face time with patient: 10 minutes  10 minutes of total time spent on the encounter, which includes face to face time and non-face to face time preparing to see the patient (eg, review of tests), Obtaining and/or reviewing separately obtained history, Documenting clinical information in the electronic or other health record, Independently interpreting results (not separately reported) and communicating results to the patient/family/caregiver, or Care coordination (not separately reported).     Each patient to whom he or she provides medical services by telemedicine is:  (1) informed of the relationship between the physician and patient and the respective role of any other health care provider with respect to management of the patient; and (2) notified that he or she may decline to receive medical services by telemedicine and may withdraw from such care at any time.    [facility] Note    Chief Complaint      Chief Complaint   Patient presents with    Anxiety     History of Present Illness      Lesa Leyva is a 44 y.o. female who presents today for anxiety.  Patient comes to appointment alone.    Problem List Items Addressed This Visit       Anxiety and depression - Primary    Relevant Medications    DULoxetine (CYMBALTA) 30 MG capsule    ALPRAZolam (XANAX) 0.25 MG tablet       Health Maintenance   Topic Date Due    Mammogram  05/10/2024    TETANUS VACCINE  01/25/2029    Hepatitis C Screening  Completed    Lipid Panel  Completed       Past Medical History:   Diagnosis Date    Anxiety     Chronic left-sided low back pain without sciatica     Cystitis     Depression     H/O: hysterectomy 5/29/2019    Hydronephrosis     Insomnia     Nephrolithiasis 11/20/2018    Neuropathy of both feet     Neuropathy of hand     Painful bladder spasm     Primary osteoarthritis of left knee      Pyelonephritis     Ureteral stricture, left     Urinary tract infection        Past Surgical History:   Procedure Laterality Date    BLADDER FULGURATION N/A 5/31/2018    Procedure: FULGURATION-BLADDER;  Surgeon: Angel Hernandez MD;  Location: [facility];  Service: Urology;  Laterality: N/A;    BLADDER SUSPENSION  2013    bladder lifts    CYSTOSCOPY W/ URETERAL STENT PLACEMENT Left 3/2015    CYSTOSCOPY W/ URETERAL STENT REMOVAL Left 5/31/2018    Procedure: CYSTOSCOPY WITH STENT REMOVAL;  Surgeon: Angel Hernandez MD;  Location: [facility];  Service: Urology;  Laterality: Left;    EXTRACORPOREAL SHOCK WAVE LITHOTRIPSY Right 9/6/2018    Procedure: LITHOTRIPSY, ESWL - Right distal ureteral calculus;  Surgeon: Angel Hernandez MD;  Location: [facility];  Service: Urology;  Laterality: Right;    EXTRACORPOREAL SHOCK WAVE LITHOTRIPSY Right 1/16/2020    Procedure: LITHOTRIPSY, ESWL;  Surgeon: Angel Hernandez MD;  Location: [facility];  Service: Urology;  Laterality: Right;    EXTRACORPOREAL SHOCK WAVE LITHOTRIPSY Left 12/8/2022    Procedure: LITHOTRIPSY, ESWL;  Surgeon: Angel Hernandez MD;  Location: [facility];  Service: Urology;  Laterality: Left;  ESWL on left renal calculs    HYSTERECTOMY      Partial - still has ovaries    KNEE SURGERY Left     lithotripsy  4/2012    OVARIAN CYST SURGERY  2006    RETROGRADE PYELOGRAPHY Left 5/31/2018    Procedure: PYELOGRAM, RETROGRADE;  Surgeon: Angel Hernandez MD;  Location: [facility];  Service: Urology;  Laterality: Left;    TOTAL KNEE ARTHROPLASTY Left 5/24/2021    Procedure: ARTHROPLASTY, KNEE, TOTAL monoblock;  Surgeon: Ralf Wilkinson MD;  Location: [facility];  Service: Orthopedics;  Laterality: Left;  Odilon w/ Braydon confirmed 5/21/21 [place]       family history includes Arthritis in her mother; Breast cancer in her maternal grandmother; COPD in her maternal grandfather; Cancer in her mother; Depression in her mother; Fibromyalgia in her mother; Hypertension in her father and mother; No Known  Problems in her sister, sister, and sister; Seizures in her brother, brother, and mother.    Social History     Tobacco Use    Smoking status: Never    Smokeless tobacco: Never   Substance Use Topics    Alcohol use: Not Currently     Comment: 2x/month    Drug use: Never       Review of Systems   HENT:  Negative for hearing loss.    Eyes:  Negative for discharge.   Respiratory:  Negative for wheezing.    Cardiovascular:  Negative for chest pain and palpitations.   Gastrointestinal:  Negative for blood in stool, constipation, diarrhea and vomiting.   Genitourinary:  Positive for dysuria. Negative for hematuria.   Musculoskeletal:  Negative for neck pain.   Neurological:  Negative for weakness and headaches.   Endo/Heme/Allergies:  Negative for polydipsia.        Outpatient Encounter Medications as of 4/17/2024   Medication Sig Dispense Refill    ALPRAZolam (XANAX) 0.25 MG tablet Take 1 tablet (0.25 mg total) by mouth 3 (three) times daily. 30 tablet 0    DULoxetine (CYMBALTA) 30 MG capsule Take 1 capsule (30 mg total) by mouth once daily. 30 capsule 11    duloxetine (CYMBALTA) 60 MG capsule Take 60 mg by mouth once daily.  6    estradioL (ESTRACE) 0.5 MG tablet Take 1 tablet by mouth daily 90 tablet 3    fremanezumab-vfrm (AJOVY) 225 mg/1.5 mL injection Inject 1.5 mLs (225 mg total) into the skin every 28 days. 1.5 mL 6    lisdexamfetamine (VYVANSE) 20 MG capsule Take 1 capsule (20 mg total) by mouth every morning. 30 capsule 0    pregabalin (LYRICA) 100 MG capsule Take 1 capsule (100 mg total) by mouth once daily. 90 capsule 3    traZODone (DESYREL) 100 MG tablet TAKE 1 TABLET BY MOUTH EVERY EVENING. 90 tablet 3    ubrogepant (UBRELVY) 100 mg tablet Take 1 tablet (100 mg total) by mouth as needed for Migraine. If symptoms persist or return, may repeat dose after 2 hours. Maximum: 200 mg per 24 hours 10 tablet 11     No facility-administered encounter medications on file as of 4/17/2024.        Review of patient's  "allergies indicates:   Allergen Reactions    Peaches [peach (prunus persica)] Hives, Itching and Swelling    Tramadol Other (See Comments)     headache       Physical Exam       ]    Physical Exam  Constitutional:       General: She is not in acute distress.     Appearance: She is well-developed.   HENT:      Head: Normocephalic and atraumatic.   Pulmonary:      Effort: Pulmonary effort is normal.   Musculoskeletal:      Cervical back: Normal range of motion.   Skin:     Findings: No rash.   Neurological:      Mental Status: She is alert and oriented to person, place, and time.      Coordination: Coordination normal.   Psychiatric:         Behavior: Behavior normal.         Thought Content: Thought content normal.         Judgment: Judgment normal.          Laboratory:  CBC:  No results for input(s): "WBC", "RBC", "HGB", "HCT", "PLT", "MCV", "MCH", "MCHC" in the last 2160 hours.    CMP:  No results for input(s): "GLU", "CALCIUM", "ALBUMIN", "PROT", "NA", "K", "CO2", "CL", "BUN", "ALKPHOS", "ALT", "AST", "BILITOT" in the last 2160 hours.    Invalid input(s): "CREATININ"    URINALYSIS:  No results for input(s): "COLORU", "CLARITYU", "SPECGRAV", "PHUR", "PROTEINUA", "GLUCOSEU", "BILIRUBINCON", "BLOODU", "WBCU", "RBCU", "BACTERIA", "MUCUS", "NITRITE", "LEUKOCYTESUR", "UROBILINOGEN", "HYALINECASTS" in the last 2160 hours.   LIPIDS:  No results for input(s): "TSH", "HDL", "CHOL", "TRIG", "LDLCALC", "CHOLHDL", "NONHDLCHOL", "TOTALCHOLEST" in the last 2160 hours.    TSH:  No results for input(s): "TSH" in the last 2160 hours.    A1C:  No results for input(s): "HGBA1C" in the last 2160 hours.      Radiology:  Mammo Digital Screening Bilat w/ Marshal    Result Date: 5/10/2023  Result:  Mammo Digital Screening Bilat w/ Marshal    History:  Patient is 43 y.o. and is seen for a screening mammogram.      Films Compared:  Compared to: 10/28/2022 US Breast Left Limited, 05/02/2022 US Breast Left   Limited, 05/02/2022 Mammo Digital " Diagnostic Left with Marshal, and   04/19/2022 Mammo Digital Screening Bilat w/ Marshal     Findings:   This procedure was performed using tomosynthesis.   Computer-aided detection was utilized in the interpretation of this   examination.    The breasts have scattered areas of fibroglandular density. There is no   evidence of suspicious masses, microcalcifications or architectural   distortion.           Assessment/Plan     Lesa Leyva is a 44 y.o.female with:    1. Anxiety and depression  - DULoxetine (CYMBALTA) 30 MG capsule; Take 1 capsule (30 mg total) by mouth once daily.  Dispense: 30 capsule; Refill: 11  - ALPRAZolam (XANAX) 0.25 MG tablet; Take 1 tablet (0.25 mg total) by mouth 3 (three) times daily.  Dispense: 30 tablet; Refill: 0    -increase cymbalta to 90 mg daily, xanax PRN for panic attacks.  Will message me in a few weeks to let me know how she is feeling  -Continue current medications and maintain follow up with specialists.    -Follow up if symptoms worsen or fail to improve.       Carmen Ugalde MD  [facility]                  Answers submitted by the patient for this visit:  Review of Systems Questionnaire (Submitted on 4/17/2024)  activity change: No  unexpected weight change: No  rhinorrhea: No  trouble swallowing: No  visual disturbance: No  chest tightness: Yes  polyuria: No  difficulty urinating: No  menstrual problem: No  joint swelling: No  arthralgias: No  confusion: No  dysphoric mood: No

## 2024-04-29 DIAGNOSIS — F90.9 ATTENTION DEFICIT HYPERACTIVITY DISORDER (ADHD), UNSPECIFIED ADHD TYPE: ICD-10-CM

## 2024-04-29 RX ORDER — LISDEXAMFETAMINE DIMESYLATE CAPSULES 20 MG/1
20 CAPSULE ORAL EVERY MORNING
Qty: 30 CAPSULE | Refills: 0 | Status: SHIPPED | OUTPATIENT
Start: 2024-04-29 | End: 2024-05-29 | Stop reason: SDUPTHER

## 2024-04-29 NOTE — TELEPHONE ENCOUNTER
No care due was identified.  [facility] Embedded Care Due Messages. Reference number: [id number].   4/29/2024 7:38:41 AM CDT

## 2024-05-01 ENCOUNTER — PATIENT MESSAGE (OUTPATIENT)
Dept: PRIMARY CARE CLINIC | Facility: CLINIC | Age: 44
End: 2024-05-01
Payer: COMMERCIAL

## 2024-05-10 ENCOUNTER — HOSPITAL ENCOUNTER (OUTPATIENT)
Dept: RADIOLOGY | Facility: HOSPITAL | Age: 44
Discharge: HOME | End: 2024-05-10
Attending: SPECIALIST
Payer: COMMERCIAL

## 2024-05-10 DIAGNOSIS — Z12.31 ENCOUNTER FOR SCREENING MAMMOGRAM FOR MALIGNANT NEOPLASM OF BREAST: ICD-10-CM

## 2024-05-10 PROCEDURE — 77067 SCR MAMMO BI INCL CAD: CPT | Mod: TC,PN

## 2024-05-10 PROCEDURE — 77067 SCR MAMMO BI INCL CAD: CPT | Mod: 26,,, | Performed by: RADIOLOGY

## 2024-05-10 PROCEDURE — 77063 BREAST TOMOSYNTHESIS BI: CPT | Mod: 26,,, | Performed by: RADIOLOGY

## 2024-05-29 DIAGNOSIS — F90.9 ATTENTION DEFICIT HYPERACTIVITY DISORDER (ADHD), UNSPECIFIED ADHD TYPE: ICD-10-CM

## 2024-05-29 RX ORDER — LISDEXAMFETAMINE DIMESYLATE CAPSULES 20 MG/1
20 CAPSULE ORAL EVERY MORNING
Qty: 30 CAPSULE | Refills: 0 | Status: SHIPPED | OUTPATIENT
Start: 2024-05-29

## 2024-05-29 NOTE — TELEPHONE ENCOUNTER
Care Due:                  Date            Visit Type   Department     Provider  --------------------------------------------------------------------------------                                ESTABLISHED                              PATIENT -    [facility]  Last Visit: 04-      VIRTUAL      CARE           Carmen Ugalde  Next Visit: None Scheduled  None         None Found                                                            Last  Test          Frequency    Reason                     Performed    Due Date  --------------------------------------------------------------------------------    Cr..........  12 months..  DULoxetine...............  08-   08-    [facility] Embedded Care Due Messages. Reference number: [id number].   5/29/2024 8:41:07 AM CDT

## 2024-06-13 ENCOUNTER — PATIENT MESSAGE (OUTPATIENT)
Dept: UROLOGY | Facility: CLINIC | Age: 44
End: 2024-06-13
Payer: COMMERCIAL

## 2024-06-13 ENCOUNTER — HOSPITAL ENCOUNTER (EMERGENCY)
Facility: HOSPITAL | Age: 44
Discharge: HOME | End: 2024-06-13
Attending: EMERGENCY MEDICINE
Payer: COMMERCIAL

## 2024-06-13 VITALS
HEART RATE: 88 BPM | OXYGEN SATURATION: 99 % | HEIGHT: 64 IN | WEIGHT: 195 LBS | TEMPERATURE: 98 F | SYSTOLIC BLOOD PRESSURE: 137 MMHG | BODY MASS INDEX: 33.29 KG/M2 | DIASTOLIC BLOOD PRESSURE: 81 MMHG | RESPIRATION RATE: 19 BRPM

## 2024-06-13 DIAGNOSIS — N12 PYELONEPHRITIS: Primary | ICD-10-CM

## 2024-06-13 LAB
ALBUMIN SERPL BCP-MCNC: 4.7 G/DL (ref 3.5–5.2)
ALP SERPL-CCNC: 59 U/L (ref 38–126)
ALT SERPL W/O P-5'-P-CCNC: 16 U/L (ref 10–44)
ANION GAP SERPL CALC-SCNC: 7 MMOL/L (ref 8–16)
AST SERPL-CCNC: 31 U/L (ref 15–46)
BACTERIA #/AREA URNS AUTO: ABNORMAL /HPF
BASOPHILS # BLD AUTO: 0.05 K/UL (ref 0–0.2)
BASOPHILS NFR BLD: 0.9 % (ref 0–1.9)
BILIRUB SERPL-MCNC: 0.7 MG/DL (ref 0.1–1)
BILIRUB UR QL STRIP: NEGATIVE
CALCIUM SERPL-MCNC: 9.6 MG/DL (ref 8.7–10.5)
CHLORIDE SERPL-SCNC: 108 MMOL/L (ref 95–110)
CLARITY UR REFRACT.AUTO: CLEAR
CO2 SERPL-SCNC: 25 MMOL/L (ref 23–29)
COLOR UR AUTO: YELLOW
CREAT SERPL-MCNC: 0.72 MG/DL (ref 0.5–1.4)
DIFFERENTIAL METHOD BLD: ABNORMAL
EOSINOPHIL # BLD AUTO: 0 K/UL (ref 0–0.5)
EOSINOPHIL NFR BLD: 0.5 % (ref 0–8)
ERYTHROCYTE [DISTWIDTH] IN BLOOD BY AUTOMATED COUNT: 12.4 % (ref 11.5–14.5)
EST. GFR  (NO RACE VARIABLE): >60 ML/MIN/1.73 M^2
GLUCOSE SERPL-MCNC: 115 MG/DL (ref 70–110)
GLUCOSE UR QL STRIP: NEGATIVE
HCT VFR BLD AUTO: 39.6 % (ref 37–48.5)
HGB BLD-MCNC: 13.5 G/DL (ref 12–16)
HGB UR QL STRIP: NEGATIVE
IMM GRANULOCYTES # BLD AUTO: 0.06 K/UL (ref 0–0.04)
IMM GRANULOCYTES NFR BLD AUTO: 1.1 % (ref 0–0.5)
KETONES UR QL STRIP: NEGATIVE
LEUKOCYTE ESTERASE UR QL STRIP: NEGATIVE
LIPASE SERPL-CCNC: 233 U/L (ref 23–300)
LYMPHOCYTES # BLD AUTO: 1.4 K/UL (ref 1–4.8)
LYMPHOCYTES NFR BLD: 25.1 % (ref 18–48)
MCH RBC QN AUTO: 30.3 PG (ref 27–31)
MCHC RBC AUTO-ENTMCNC: 34.1 G/DL (ref 32–36)
MCV RBC AUTO: 89 FL (ref 82–98)
MICROSCOPIC COMMENT: ABNORMAL
MONOCYTES # BLD AUTO: 0.6 K/UL (ref 0.3–1)
MONOCYTES NFR BLD: 10 % (ref 4–15)
NEUTROPHILS # BLD AUTO: 3.5 K/UL (ref 1.8–7.7)
NEUTROPHILS NFR BLD: 62.4 % (ref 38–73)
NITRITE UR QL STRIP: POSITIVE
NRBC BLD-RTO: 0 /100 WBC
PH UR STRIP: 6 [PH] (ref 5–8)
PLATELET # BLD AUTO: 253 K/UL (ref 150–450)
PLATELET BLD QL SMEAR: ABNORMAL
PMV BLD AUTO: 12.7 FL (ref 9.2–12.9)
POTASSIUM SERPL-SCNC: 4.5 MMOL/L (ref 3.5–5.1)
PROT SERPL-MCNC: 7.9 G/DL (ref 6–8.4)
PROT UR QL STRIP: NEGATIVE
RBC # BLD AUTO: 4.45 M/UL (ref 4–5.4)
RBC #/AREA URNS AUTO: 1 /HPF (ref 0–4)
SODIUM SERPL-SCNC: 140 MMOL/L (ref 136–145)
SP GR UR STRIP: 1.02 (ref 1–1.03)
URN SPEC COLLECT METH UR: ABNORMAL
UROBILINOGEN UR STRIP-ACNC: NEGATIVE EU/DL
UUN UR-MCNC: 15 MG/DL (ref 7–17)
WBC # BLD AUTO: 5.61 K/UL (ref 3.9–12.7)
WBC #/AREA URNS AUTO: 6 /HPF (ref 0–5)
WBC CLUMPS UR QL AUTO: ABNORMAL
YEAST UR QL AUTO: ABNORMAL

## 2024-06-13 PROCEDURE — 80053 COMPREHEN METABOLIC PANEL: CPT | Mod: ER | Performed by: PHYSICIAN ASSISTANT

## 2024-06-13 PROCEDURE — 85025 COMPLETE CBC W/AUTO DIFF WBC: CPT | Mod: ER | Performed by: PHYSICIAN ASSISTANT

## 2024-06-13 PROCEDURE — 96372 THER/PROPH/DIAG INJ SC/IM: CPT

## 2024-06-13 PROCEDURE — 81000 URINALYSIS NONAUTO W/SCOPE: CPT | Mod: ER | Performed by: PHYSICIAN ASSISTANT

## 2024-06-13 PROCEDURE — 63600175 PHARM REV CODE 636 W HCPCS: Mod: ER

## 2024-06-13 PROCEDURE — 96375 TX/PRO/DX INJ NEW DRUG ADDON: CPT | Mod: ER

## 2024-06-13 PROCEDURE — 25000003 PHARM REV CODE 250: Mod: ER

## 2024-06-13 PROCEDURE — 83690 ASSAY OF LIPASE: CPT | Mod: ER | Performed by: PHYSICIAN ASSISTANT

## 2024-06-13 PROCEDURE — 96365 THER/PROPH/DIAG IV INF INIT: CPT | Mod: ER

## 2024-06-13 PROCEDURE — 99285 EMERGENCY DEPT VISIT HI MDM: CPT | Mod: 25,ER

## 2024-06-13 RX ORDER — KETOROLAC TROMETHAMINE 30 MG/ML
15 INJECTION, SOLUTION INTRAMUSCULAR; INTRAVENOUS
Status: COMPLETED | OUTPATIENT
Start: 2024-06-13 | End: 2024-06-13

## 2024-06-13 RX ORDER — ACETAMINOPHEN 500 MG
1000 TABLET ORAL 4 TIMES DAILY
Qty: 112 TABLET | Refills: 0 | Status: SHIPPED | OUTPATIENT
Start: 2024-06-13 | End: 2024-06-27

## 2024-06-13 RX ORDER — ONDANSETRON 4 MG/1
4 TABLET, ORALLY DISINTEGRATING ORAL
Status: COMPLETED | OUTPATIENT
Start: 2024-06-13 | End: 2024-06-13

## 2024-06-13 RX ORDER — CEFPODOXIME PROXETIL 100 MG/1
200 TABLET, FILM COATED ORAL EVERY 12 HOURS
Qty: 40 TABLET | Refills: 0 | Status: SHIPPED | OUTPATIENT
Start: 2024-06-13 | End: 2024-06-23

## 2024-06-13 RX ORDER — KETOROLAC TROMETHAMINE 10 MG/1
10 TABLET, FILM COATED ORAL EVERY 6 HOURS
Qty: 20 TABLET | Refills: 0 | Status: SHIPPED | OUTPATIENT
Start: 2024-06-13 | End: 2024-06-18

## 2024-06-13 RX ORDER — MORPHINE SULFATE 4 MG/ML
4 INJECTION, SOLUTION INTRAMUSCULAR; INTRAVENOUS
Status: COMPLETED | OUTPATIENT
Start: 2024-06-13 | End: 2024-06-13

## 2024-06-13 RX ORDER — ONDANSETRON 4 MG/1
4 TABLET, ORALLY DISINTEGRATING ORAL EVERY 8 HOURS PRN
Qty: 9 TABLET | Refills: 0 | Status: SHIPPED | OUTPATIENT
Start: 2024-06-13 | End: 2024-06-16

## 2024-06-13 RX ADMIN — CEFTRIAXONE SODIUM 1 G: 1 INJECTION, POWDER, FOR SOLUTION INTRAMUSCULAR; INTRAVENOUS at 02:06

## 2024-06-13 RX ADMIN — ONDANSETRON 4 MG: 4 TABLET, ORALLY DISINTEGRATING ORAL at 01:06

## 2024-06-13 RX ADMIN — MORPHINE SULFATE 4 MG: 4 INJECTION INTRAVENOUS at 02:06

## 2024-06-13 RX ADMIN — KETOROLAC TROMETHAMINE 15 MG: 30 INJECTION, SOLUTION INTRAMUSCULAR at 01:06

## 2024-06-13 NOTE — FIRST PROVIDER EVALUATION
Emergency Department TeleTriage Encounter Note      CHIEF COMPLAINT    Chief Complaint   Patient presents with    Dysuria    Vomiting     Pt reports dysuria last night, vomiting and hematuria this morning. Zofran and Tylenol at 0700       VITAL SIGNS   Initial Vitals [06/13/24 1209]   BP Pulse Resp Temp SpO2   130/89 104 18 98.2 °F (36.8 °C) 99 %      MAP       --            ALLERGIES    Review of patient's allergies indicates:   Allergen Reactions    Peaches [peach (prunus persica)] Hives, Itching and Swelling    Tramadol Other (See Comments)     headache       PROVIDER TRIAGE NOTE  Patient presents with complaint of nausea, vomiting, dysuria and back pain.  Denies fevers.  Denies change in bowel movements.      Phy:   Constitutional: well nourished, well developed, appearing stated age, NAD        Initial orders will be placed and care will be transferred to an alternate provider when patient is roomed for a full evaluation. Any additional orders and the final disposition will be determined by that provider.        ORDERS  Labs Reviewed - No data to display    ED Orders (720h ago, onward)      None              Virtual Visit Note: The provider triage portion of this emergency department evaluation and documentation was performed via [employer], a HIPAA-compliant telemedicine application, in concert with a tele-presenter in the room. A face to face patient evaluation with one of my colleagues will occur once the patient is placed in an emergency department room.      DISCLAIMER: This note was prepared with [employer] voice recognition transcription software. Garbled syntax, mangled pronouns, and other bizarre constructions may be attributed to that software system.

## 2024-06-13 NOTE — ED PROVIDER NOTES
Encounter Date: 6/13/2024       History     Chief Complaint   Patient presents with    Dysuria    Vomiting     Pt reports dysuria last night, vomiting and hematuria this morning. Zofran and Tylenol at 0700     Lesa Leyva is a 44 y.o. female  has a past medical history of Anxiety, Chronic left-sided low back pain without sciatica, Cystitis, Depression, H/O: hysterectomy, Hydronephrosis, Insomnia, Nephrolithiasis, Neuropathy of both feet, Neuropathy of hand, Painful bladder spasm, Primary osteoarthritis of left knee, Pyelonephritis, Ureteral stricture, left, and Urinary tract infection. presenting to the Emergency Department for Dysuria since last night and nausea, vomiting, flank pain since this morning.  Playing pain comes and goes.  Also hematuria this morning.  Patient has a history of kidney stones.  No abdominal pain.  No fevers or chills.  Regular bowel movements.  Last bowel movement this morning and was normal.          The history is provided by the patient.     Review of patient's allergies indicates:   Allergen Reactions    Peaches [peach (prunus persica)] Hives, Itching and Swelling    Tramadol Other (See Comments)     headache     Past Medical History:   Diagnosis Date    Anxiety     Chronic left-sided low back pain without sciatica     Cystitis     Depression     H/O: hysterectomy 5/29/2019    Hydronephrosis     Insomnia     Nephrolithiasis 11/20/2018    Neuropathy of both feet     Neuropathy of hand     Painful bladder spasm     Primary osteoarthritis of left knee     Pyelonephritis     Ureteral stricture, left     Urinary tract infection      Past Surgical History:   Procedure Laterality Date    BLADDER FULGURATION N/A 5/31/2018    Procedure: FULGURATION-BLADDER;  Surgeon: Angel Hernandez MD;  Location: [facility];  Service: Urology;  Laterality: N/A;    BLADDER SUSPENSION  2013    bladder lifts    CYSTOSCOPY W/ URETERAL STENT PLACEMENT Left 3/2015    CYSTOSCOPY W/ URETERAL STENT REMOVAL Left  5/31/2018    Procedure: CYSTOSCOPY WITH STENT REMOVAL;  Surgeon: Angel Hernandez MD;  Location: [facility] OR;  Service: Urology;  Laterality: Left;    EXTRACORPOREAL SHOCK WAVE LITHOTRIPSY Right 9/6/2018    Procedure: LITHOTRIPSY, ESWL - Right distal ureteral calculus;  Surgeon: Angel Hernandez MD;  Location: [facility];  Service: Urology;  Laterality: Right;    EXTRACORPOREAL SHOCK WAVE LITHOTRIPSY Right 1/16/2020    Procedure: LITHOTRIPSY, ESWL;  Surgeon: Angel Hernandez MD;  Location: [facility] OR;  Service: Urology;  Laterality: Right;    EXTRACORPOREAL SHOCK WAVE LITHOTRIPSY Left 12/8/2022    Procedure: LITHOTRIPSY, ESWL;  Surgeon: Angel Hernandez MD;  Location: [facility];  Service: Urology;  Laterality: Left;  ESWL on left renal calculs    HYSTERECTOMY      Partial - still has ovaries    KNEE SURGERY Left     lithotripsy  4/2012    OVARIAN CYST SURGERY  2006    RETROGRADE PYELOGRAPHY Left 5/31/2018    Procedure: PYELOGRAM, RETROGRADE;  Surgeon: Angel Hernandez MD;  Location: [facility];  Service: Urology;  Laterality: Left;    TOTAL KNEE ARTHROPLASTY Left 5/24/2021    Procedure: ARTHROPLASTY, KNEE, TOTAL monoblock;  Surgeon: Ralf Wilkinson MD;  Location: [facility];  Service: Orthopedics;  Laterality: Left;  Odilon tavera/ Braydon confirmed 5/21/21 [place]     Family History   Problem Relation Name Age of Onset    Hypertension Mother Shantel     Seizures Mother Shantel     Fibromyalgia Mother Shantel     Arthritis Mother Shantel     Cancer Mother Shantel     Depression Mother Shantel     Hypertension Father Clifton     No Known Problems Sister      No Known Problems Sister      No Known Problems Sister      Breast cancer Maternal Grandmother      COPD Maternal Grandfather David     Seizures Brother      Seizures Brother      Cancer Neg Hx      Heart disease Neg Hx      Kidney disease Neg Hx       Social History     Tobacco Use    Smoking status: Never    Smokeless tobacco: Never   Substance Use Topics    Alcohol use: Not Currently     Comment: 2x/month     Drug use: Never     Review of Systems   Constitutional:  Negative for fever.   HENT:  Negative for sore throat.    Respiratory:  Negative for shortness of breath.    Cardiovascular:  Negative for chest pain.   Gastrointestinal:  Positive for abdominal pain, nausea and vomiting. Negative for constipation and diarrhea.   Genitourinary:  Positive for dysuria, flank pain and hematuria.   Musculoskeletal:  Negative for back pain.   Skin:  Negative for rash.   Neurological:  Negative for weakness.   Hematological:  Does not bruise/bleed easily.   All other systems reviewed and are negative.      Physical Exam     Initial Vitals [06/13/24 1209]   BP Pulse Resp Temp SpO2   130/89 104 18 98.2 °F (36.8 °C) 99 %      MAP       --         Physical Exam    Nursing note and vitals reviewed.  Constitutional: She appears well-developed and well-nourished. She is not diaphoretic. No distress.   HENT:   Head: Normocephalic.   Right Ear: Hearing, tympanic membrane and external ear normal.   Left Ear: Hearing, tympanic membrane and external ear normal.   Nose: Nose normal.   Mouth/Throat: Oropharynx is clear and moist.   Eyes: Conjunctivae, EOM and lids are normal. Pupils are equal, round, and reactive to light.   Neck: Neck supple.   Normal range of motion.  Cardiovascular:  Normal rate, regular rhythm and normal heart sounds.           Pulmonary/Chest: Breath sounds normal. No respiratory distress. She has no wheezes. She has no rhonchi.   Abdominal: Abdomen is soft. Bowel sounds are normal. There is no abdominal tenderness.   There is left CVA tenderness.     There is no rebound and no guarding.   Musculoskeletal:         General: Normal range of motion.      Cervical back: Normal range of motion and neck supple. No rigidity.     Lymphadenopathy:     She has no cervical adenopathy.   Neurological: She is alert and oriented to person, place, and time. She has normal strength. GCS score is 15. GCS eye subscore is 4. GCS verbal  subscore is 5. GCS motor subscore is 6.   Skin: Skin is warm and dry. Capillary refill takes less than 2 seconds. No rash noted.   Psychiatric: She has a normal mood and affect. Her behavior is normal. Judgment and thought content normal.         ED Course   Procedures  Labs Reviewed   CBC W/ AUTO DIFFERENTIAL - Abnormal; Notable for the following components:       Result Value    Immature Granulocytes 1.1 (*)     Immature Grans (Abs) 0.06 (*)     All other components within normal limits   COMPREHENSIVE METABOLIC PANEL - Abnormal; Notable for the following components:    Glucose 115 (*)     Anion Gap 7 (*)     All other components within normal limits   URINALYSIS, REFLEX TO URINE CULTURE - Abnormal; Notable for the following components:    Nitrite, UA Positive (*)     All other components within normal limits    Narrative:     Preferred Collection Type->Urine, Clean Catch  Specimen Source->Urine   URINALYSIS MICROSCOPIC - Abnormal; Notable for the following components:    WBC, UA 6 (*)     WBC Clumps, UA Few (*)     Bacteria Many (*)     All other components within normal limits    Narrative:     Preferred Collection Type->Urine, Clean Catch  Specimen Source->Urine   LIPASE          Imaging Results              CT Renal Stone Study ABD Pelvis WO (Final result)  Result time 06/13/24 13:54:00      Final result by Peng Fischer MD (06/13/24 13:54:00)                   Impression:      No evidence of obstructive uropathy.    All CT scans at this facility use dose modulation, iterative reconstructions, and/or weight base dosing when appropriate to reduce radiation dose to as low as reasonably achievable    All CT scans at this facility use dose modulation, iterative reconstruction, and/or weight based dosing when appropriate to reduce radiation dose to as low as reasonable achievable.      Electronically signed by: Peng Fischer MD  Date:    06/13/2024  Time:    13:54               Narrative:    EXAMINATION:  CT  RENAL STONE STUDY ABD PELVIS WO    CLINICAL HISTORY:  Flank pain, kidney stone suspected;    TECHNIQUE:  Low dose axial images, sagittal and coronal reformations were obtained from the lung bases to the pubic symphysis.  Oral contrast was not administered.    COMPARISON:  08/14/2023    FINDINGS:  Heart: Normal size. No effusion.    Lung Bases: Clear.    Liver: Normal size and attenuation. No focal lesions.    Gallbladder: Collapsed    Bile Ducts: No dilatation.    Pancreas: No obvious mass. No peripancreatic fat stranding.    Spleen: Normal.    Adrenals: Normal.    Kidneys/Ureters: Renal cortical thinning and lobular configuration.  Multiple punctate nonobstructing stones are seen within both kidneys.  No mass, hydroureteronephrosis, or nephroureterolithiasis.    Bladder: No wall thickening.    Reproductive organs: Normal.    GI Tract/Mesentery: No evidence of bowel obstruction or inflammation.  Appendix is not seen.    Peritoneal Space: No ascites or free air.    Retroperitoneum: No significant adenopathy.    Abdominal wall: Small fat containing umbilical hernia.    Vasculature: No aneurysm.    Bones: No acute fracture. No suspicious lytic or sclerotic lesions.                                       Medications   ketorolac injection 15 mg (15 mg Intramuscular Given 6/13/24 1300)   ondansetron disintegrating tablet 4 mg (4 mg Oral Given 6/13/24 1300)   cefTRIAXone (Rocephin) 1 g in dextrose 5 % in water (D5W) 100 mL IVPB (MB+) (0 g Intravenous Stopped 6/13/24 1451)   morphine injection 4 mg (4 mg Intravenous Given 6/13/24 1421)     Medical Decision Making  This is an emergent evaluation of 44 y.o. female in the ED presenting for left flank abdominal pain. Physical exam reveals a non-toxic, afebrile, and well-appearing female in no apparent respiratory distress. Pertinent physical exam findings above. Vital signs stable. If available, previous records reviewed.    P.o. challenge tolerated.    My overall impression is  pyelonephritis. Differential Diagnosis: including but not limited to acute constipation, early appendicitis, colitis, diverticulitis, volvulus, small bowel obstruction, pancreatitis, mesenteric ischemia, gastroenteritis, peritonititis, AAA, large bowel obstruction/volvulus, IBS, DKA, hernia, kidney stone, intra-abdominal infection vs abscess, abdominal perforation, nephrolithiasis     Discharge Meds/Instructions:  Cefpodoxime.  Pain management.    There does not appear to be any indication for further emergent testing, observation, or hospitalization at this time. A mutual shared decision making discussion was had with the patient. Patient appears stable for and is comfortable with discharge home. The diagnosis, treatment plan, instructions for follow-up as well as ED return precautions were discussed. Advised to follow-up with PCP for outpatient follow-up in 2-3 days. Signs and symptoms that would warrant immediate return to ED were reviewed prior to discharge. All questions and concerns were asked, answered, and addressed. Patient expressed understanding and agreement with the plan.       Amount and/or Complexity of Data Reviewed  Labs:  Decision-making details documented in ED Course.  Radiology: ordered. Decision-making details documented in ED Course.    Risk  OTC drugs.  Prescription drug management.               ED Course as of 06/13/24 1628   Thu Jun 13, 2024   1331 NITRITE UA(!): Positive [LH]   1404 CT Renal Stone Study ABD Pelvis WO  I have read the radiologist's report.  Evidence of pyelonephritis.  No nephrolithiasis. [LH]   1625 WBC: 5.61 [LH]   1625 RBC: 4.45 [LH]   1625 Hemoglobin: 13.5 [LH]   1625 Glucose(!): 115 [LH]   1625 Lipase Result: 233 [LH]      ED Course User Index  [LH] Elvia Stanford PA-C                           Clinical Impression:  Final diagnoses:  [N12] Pyelonephritis (Primary)          ED Disposition Condition    Discharge Stable          ED Prescriptions       Medication Sig  Dispense Start Date End Date Auth. Provider    cefpodoxime (VANTIN) 100 MG tablet Take 2 tablets (200 mg total) by mouth every 12 (twelve) hours. for 10 days 40 tablet 6/13/2024 6/23/2024 Elvia Stanford PA-C    ketorolac (TORADOL) 10 mg tablet Take 1 tablet (10 mg total) by mouth every 6 (six) hours. for 5 days 20 tablet 6/13/2024 6/18/2024 Elvia Stanford PA-C    acetaminophen (TYLENOL) 500 MG tablet Take 2 tablets (1,000 mg total) by mouth 4 (four) times daily. for 14 days 112 tablet 6/13/2024 6/27/2024 Elvia Stafnord PA-C    ondansetron (ZOFRAN-ODT) 4 MG TbDL Take 1 tablet (4 mg total) by mouth every 8 (eight) hours as needed (nausea). 9 tablet 6/13/2024 6/16/2024 Elvia Stanford PA-C          Follow-up Information    None          Elvia Stanford PA-C  06/13/24 [id number]

## 2024-06-13 NOTE — DISCHARGE INSTRUCTIONS
Start taking antibiotics as prescribed, and continue taking medication until the entire prescription has been completed. Avoid using drugs/alcohol while on antibiotics, and for the next 72 hours after finishing the prescription. Obtain an appointment with your primary care provider or return to the ED for any symptoms of worsening infection, including fever, nausea/vomiting or inability to take the medication, antibiotic side effects, allergic reaction, or any other concerns.

## 2024-06-13 NOTE — Clinical Note
"Lesa Leyva (Michelle) was seen and treated in our emergency department on 6/13/2024.  She may return to work on 06/16/2024.       If you have any questions or concerns, please don't hesitate to call.      Elvia Stanford PA-C"

## 2024-06-16 DIAGNOSIS — F32.4 MAJOR DEPRESSIVE DISORDER, SINGLE EPISODE, IN PARTIAL REMISSION: ICD-10-CM

## 2024-06-16 RX ORDER — DULOXETIN HYDROCHLORIDE 60 MG/1
60 CAPSULE, DELAYED RELEASE ORAL
Qty: 90 CAPSULE | Refills: 3 | Status: SHIPPED | OUTPATIENT
Start: 2024-06-16

## 2024-06-16 NOTE — TELEPHONE ENCOUNTER
No care due was identified.  [facility] Embedded Care Due Messages. Reference number: [id number].   6/16/2024 12:48:26 AM CDT

## 2024-06-30 DIAGNOSIS — F90.9 ATTENTION DEFICIT HYPERACTIVITY DISORDER (ADHD), UNSPECIFIED ADHD TYPE: ICD-10-CM

## 2024-06-30 NOTE — TELEPHONE ENCOUNTER
No care due was identified.  [facility] Embedded Care Due Messages. Reference number: [id number].   6/30/2024 3:37:09 PM CDT

## 2024-07-01 RX ORDER — LISDEXAMFETAMINE DIMESYLATE CAPSULES 20 MG/1
20 CAPSULE ORAL EVERY MORNING
Qty: 30 CAPSULE | Refills: 0 | Status: SHIPPED | OUTPATIENT
Start: 2024-07-01

## 2024-07-04 ENCOUNTER — HOSPITAL ENCOUNTER (EMERGENCY)
Facility: HOSPITAL | Age: 44
Discharge: HOME | End: 2024-07-05
Attending: STUDENT IN AN ORGANIZED HEALTH CARE EDUCATION/TRAINING PROGRAM
Payer: COMMERCIAL

## 2024-07-04 DIAGNOSIS — N13.30 HYDRONEPHROSIS, LEFT: ICD-10-CM

## 2024-07-04 DIAGNOSIS — N23 URETERAL COLIC: Primary | ICD-10-CM

## 2024-07-04 LAB
ALBUMIN SERPL BCP-MCNC: 4.5 G/DL (ref 3.5–5.2)
ALP SERPL-CCNC: 75 U/L (ref 38–126)
ALT SERPL W/O P-5'-P-CCNC: 15 U/L (ref 10–44)
ANION GAP SERPL CALC-SCNC: 15 MMOL/L (ref 8–16)
AST SERPL-CCNC: 20 U/L (ref 15–46)
BASOPHILS # BLD AUTO: 0.04 K/UL (ref 0–0.2)
BASOPHILS NFR BLD: 0.4 % (ref 0–1.9)
BILIRUB SERPL-MCNC: 0.4 MG/DL (ref 0.1–1)
BILIRUB UR QL STRIP: NEGATIVE
CALCIUM SERPL-MCNC: 9.5 MG/DL (ref 8.7–10.5)
CHLORIDE SERPL-SCNC: 106 MMOL/L (ref 95–110)
CLARITY UR REFRACT.AUTO: CLEAR
CO2 SERPL-SCNC: 21 MMOL/L (ref 23–29)
COLOR UR AUTO: YELLOW
CREAT SERPL-MCNC: 1.09 MG/DL (ref 0.5–1.4)
DIFFERENTIAL METHOD BLD: ABNORMAL
EOSINOPHIL # BLD AUTO: 0.1 K/UL (ref 0–0.5)
EOSINOPHIL NFR BLD: 0.5 % (ref 0–8)
ERYTHROCYTE [DISTWIDTH] IN BLOOD BY AUTOMATED COUNT: 12.4 % (ref 11.5–14.5)
EST. GFR  (NO RACE VARIABLE): >60 ML/MIN/1.73 M^2
GLUCOSE SERPL-MCNC: 94 MG/DL (ref 70–110)
GLUCOSE UR QL STRIP: NEGATIVE
HCT VFR BLD AUTO: 41.2 % (ref 37–48.5)
HGB BLD-MCNC: 13.9 G/DL (ref 12–16)
HGB UR QL STRIP: ABNORMAL
IMM GRANULOCYTES # BLD AUTO: 0.07 K/UL (ref 0–0.04)
IMM GRANULOCYTES NFR BLD AUTO: 0.7 % (ref 0–0.5)
KETONES UR QL STRIP: NEGATIVE
LEUKOCYTE ESTERASE UR QL STRIP: NEGATIVE
LIPASE SERPL-CCNC: 215 U/L (ref 23–300)
LYMPHOCYTES # BLD AUTO: 1.4 K/UL (ref 1–4.8)
LYMPHOCYTES NFR BLD: 13.3 % (ref 18–48)
MCH RBC QN AUTO: 29.9 PG (ref 27–31)
MCHC RBC AUTO-ENTMCNC: 33.7 G/DL (ref 32–36)
MCV RBC AUTO: 89 FL (ref 82–98)
MONOCYTES # BLD AUTO: 1.2 K/UL (ref 0.3–1)
MONOCYTES NFR BLD: 11.2 % (ref 4–15)
NEUTROPHILS # BLD AUTO: 7.8 K/UL (ref 1.8–7.7)
NEUTROPHILS NFR BLD: 73.9 % (ref 38–73)
NITRITE UR QL STRIP: NEGATIVE
NRBC BLD-RTO: 0 /100 WBC
PH UR STRIP: 6 [PH] (ref 5–8)
PLATELET # BLD AUTO: 252 K/UL (ref 150–450)
PMV BLD AUTO: 10.9 FL (ref 9.2–12.9)
POTASSIUM SERPL-SCNC: 4.1 MMOL/L (ref 3.5–5.1)
PROT SERPL-MCNC: 7.7 G/DL (ref 6–8.4)
PROT UR QL STRIP: NEGATIVE
RBC # BLD AUTO: 4.65 M/UL (ref 4–5.4)
SODIUM SERPL-SCNC: 142 MMOL/L (ref 136–145)
SP GR UR STRIP: >=1.03 (ref 1–1.03)
URN SPEC COLLECT METH UR: ABNORMAL
UROBILINOGEN UR STRIP-ACNC: 1 EU/DL
UUN UR-MCNC: 19 MG/DL (ref 7–17)
WBC # BLD AUTO: 10.6 K/UL (ref 3.9–12.7)

## 2024-07-04 PROCEDURE — 25000003 PHARM REV CODE 250: Mod: ER | Performed by: STUDENT IN AN ORGANIZED HEALTH CARE EDUCATION/TRAINING PROGRAM

## 2024-07-04 PROCEDURE — 25500020 PHARM REV CODE 255: Mod: ER | Performed by: STUDENT IN AN ORGANIZED HEALTH CARE EDUCATION/TRAINING PROGRAM

## 2024-07-04 PROCEDURE — 96375 TX/PRO/DX INJ NEW DRUG ADDON: CPT | Mod: ER

## 2024-07-04 PROCEDURE — 85025 COMPLETE CBC W/AUTO DIFF WBC: CPT | Mod: ER | Performed by: STUDENT IN AN ORGANIZED HEALTH CARE EDUCATION/TRAINING PROGRAM

## 2024-07-04 PROCEDURE — 63600175 PHARM REV CODE 636 W HCPCS: Mod: ER | Performed by: STUDENT IN AN ORGANIZED HEALTH CARE EDUCATION/TRAINING PROGRAM

## 2024-07-04 PROCEDURE — 81003 URINALYSIS AUTO W/O SCOPE: CPT | Mod: ER | Performed by: STUDENT IN AN ORGANIZED HEALTH CARE EDUCATION/TRAINING PROGRAM

## 2024-07-04 PROCEDURE — 96374 THER/PROPH/DIAG INJ IV PUSH: CPT | Mod: ER

## 2024-07-04 PROCEDURE — 80053 COMPREHEN METABOLIC PANEL: CPT | Mod: ER | Performed by: STUDENT IN AN ORGANIZED HEALTH CARE EDUCATION/TRAINING PROGRAM

## 2024-07-04 PROCEDURE — 83690 ASSAY OF LIPASE: CPT | Mod: ER | Performed by: STUDENT IN AN ORGANIZED HEALTH CARE EDUCATION/TRAINING PROGRAM

## 2024-07-04 PROCEDURE — 99285 EMERGENCY DEPT VISIT HI MDM: CPT | Mod: 25,ER

## 2024-07-04 PROCEDURE — 96361 HYDRATE IV INFUSION ADD-ON: CPT | Mod: ER

## 2024-07-04 RX ORDER — KETOROLAC TROMETHAMINE 30 MG/ML
10 INJECTION, SOLUTION INTRAMUSCULAR; INTRAVENOUS
Status: COMPLETED | OUTPATIENT
Start: 2024-07-04 | End: 2024-07-04

## 2024-07-04 RX ORDER — MORPHINE SULFATE 4 MG/ML
4 INJECTION, SOLUTION INTRAMUSCULAR; INTRAVENOUS
Status: COMPLETED | OUTPATIENT
Start: 2024-07-04 | End: 2024-07-04

## 2024-07-04 RX ORDER — ONDANSETRON HYDROCHLORIDE 2 MG/ML
4 INJECTION, SOLUTION INTRAVENOUS
Status: COMPLETED | OUTPATIENT
Start: 2024-07-04 | End: 2024-07-04

## 2024-07-04 RX ADMIN — IOHEXOL 100 ML: 350 INJECTION, SOLUTION INTRAVENOUS at 11:07

## 2024-07-04 RX ADMIN — SODIUM CHLORIDE 1000 ML: 9 INJECTION, SOLUTION INTRAVENOUS at 09:07

## 2024-07-04 RX ADMIN — KETOROLAC TROMETHAMINE 10 MG: 30 INJECTION, SOLUTION INTRAMUSCULAR at 09:07

## 2024-07-04 RX ADMIN — MORPHINE SULFATE 4 MG: 4 INJECTION INTRAVENOUS at 10:07

## 2024-07-04 RX ADMIN — ONDANSETRON 4 MG: 2 INJECTION INTRAMUSCULAR; INTRAVENOUS at 09:07

## 2024-07-05 VITALS
HEART RATE: 87 BPM | HEIGHT: 64 IN | OXYGEN SATURATION: 100 % | SYSTOLIC BLOOD PRESSURE: 118 MMHG | WEIGHT: 195 LBS | DIASTOLIC BLOOD PRESSURE: 78 MMHG | TEMPERATURE: 98 F | BODY MASS INDEX: 33.29 KG/M2 | RESPIRATION RATE: 19 BRPM

## 2024-07-05 PROCEDURE — 96376 TX/PRO/DX INJ SAME DRUG ADON: CPT | Mod: ER

## 2024-07-05 PROCEDURE — 63600175 PHARM REV CODE 636 W HCPCS: Mod: ER | Performed by: STUDENT IN AN ORGANIZED HEALTH CARE EDUCATION/TRAINING PROGRAM

## 2024-07-05 RX ORDER — HYDROCODONE BITARTRATE AND ACETAMINOPHEN 5; 325 MG/1; MG/1
1 TABLET ORAL EVERY 6 HOURS PRN
Qty: 12 TABLET | Refills: 0 | Status: SHIPPED | OUTPATIENT
Start: 2024-07-05

## 2024-07-05 RX ORDER — TAMSULOSIN HYDROCHLORIDE 0.4 MG/1
0.4 CAPSULE ORAL DAILY
Qty: 10 CAPSULE | Refills: 0 | Status: SHIPPED | OUTPATIENT
Start: 2024-07-05 | End: 2024-07-11 | Stop reason: SDUPTHER

## 2024-07-05 RX ORDER — KETOROLAC TROMETHAMINE 10 MG/1
10 TABLET, FILM COATED ORAL EVERY 6 HOURS PRN
Qty: 20 TABLET | Refills: 0 | Status: SHIPPED | OUTPATIENT
Start: 2024-07-05 | End: 2024-07-11 | Stop reason: SDUPTHER

## 2024-07-05 RX ORDER — MORPHINE SULFATE 4 MG/ML
4 INJECTION, SOLUTION INTRAMUSCULAR; INTRAVENOUS
Status: COMPLETED | OUTPATIENT
Start: 2024-07-05 | End: 2024-07-05

## 2024-07-05 RX ADMIN — MORPHINE SULFATE 4 MG: 4 INJECTION INTRAVENOUS at 12:07

## 2024-07-05 NOTE — ED PROVIDER NOTES
NAME:  Lesa Leyva  CSN:     [id number]  MRN:    [id number]  ADMIT DATE: 7/4/2024        eMERGENCY dEPARTMENT eNCOUnter    CHIEF COMPLAINT    Chief Complaint   Patient presents with    Abdominal Pain     Right side abd pain with 1 episode of vomiting that began 3 hours ago and is now radiating to her back.        HPI    Lesa eLyva is a 44 y.o. female with a past medical history of  has a past medical history of Anxiety, Chronic left-sided low back pain without sciatica, Cystitis, Depression, H/O: hysterectomy (5/29/2019), Hydronephrosis, Insomnia, Nephrolithiasis (11/20/2018), Neuropathy of both feet, Neuropathy of hand, Painful bladder spasm, Primary osteoarthritis of left knee, Pyelonephritis, Ureteral stricture, left, and Urinary tract infection.     she presents to the ED due to pain that started in the left upper quadrant around 1:00 a.m. today.  She states it started to make a Den shave and radiate down to the suprapubic region and now radiates to the left flank.  Has had nausea and vomiting with this.  States she was never had pain like this in the past.  Denies any urinary symptoms.  Does not think it was a kidney stone as she states it is not feel similar.  Notes she was recently treated for pyelonephritis within the last month and did complete antibiotic treatment.  No fevers.      HPI       PAST MEDICAL HISTORY  Past Medical History:   Diagnosis Date    Anxiety     Chronic left-sided low back pain without sciatica     Cystitis     Depression     H/O: hysterectomy 5/29/2019    Hydronephrosis     Insomnia     Nephrolithiasis 11/20/2018    Neuropathy of both feet     Neuropathy of hand     Painful bladder spasm     Primary osteoarthritis of left knee     Pyelonephritis     Ureteral stricture, left     Urinary tract infection        SURGICAL HISTORY    Past Surgical History:   Procedure Laterality Date    BLADDER FULGURATION N/A 5/31/2018    Procedure: FULGURATION-BLADDER;  Surgeon: Angel Hernandez,  MD;  Location: [facility];  Service: Urology;  Laterality: N/A;    BLADDER SUSPENSION  2013    bladder lifts    CYSTOSCOPY W/ URETERAL STENT PLACEMENT Left 3/2015    CYSTOSCOPY W/ URETERAL STENT REMOVAL Left 5/31/2018    Procedure: CYSTOSCOPY WITH STENT REMOVAL;  Surgeon: Angel Hernandez MD;  Location: [facility];  Service: Urology;  Laterality: Left;    EXTRACORPOREAL SHOCK WAVE LITHOTRIPSY Right 9/6/2018    Procedure: LITHOTRIPSY, ESWL - Right distal ureteral calculus;  Surgeon: Angel Hernandez MD;  Location: [facility];  Service: Urology;  Laterality: Right;    EXTRACORPOREAL SHOCK WAVE LITHOTRIPSY Right 1/16/2020    Procedure: LITHOTRIPSY, ESWL;  Surgeon: Angel Hernandez MD;  Location: [facility];  Service: Urology;  Laterality: Right;    EXTRACORPOREAL SHOCK WAVE LITHOTRIPSY Left 12/8/2022    Procedure: LITHOTRIPSY, ESWL;  Surgeon: Angel Hernandez MD;  Location: [facility];  Service: Urology;  Laterality: Left;  ESWL on left renal calculs    HYSTERECTOMY      Partial - still has ovaries    KNEE SURGERY Left     lithotripsy  4/2012    OVARIAN CYST SURGERY  2006    RETROGRADE PYELOGRAPHY Left 5/31/2018    Procedure: PYELOGRAM, RETROGRADE;  Surgeon: Angel Hernandez MD;  Location: [facility];  Service: Urology;  Laterality: Left;    TOTAL KNEE ARTHROPLASTY Left 5/24/2021    Procedure: ARTHROPLASTY, KNEE, TOTAL monoblock;  Surgeon: Ralf Wilkinson MD;  Location: [facility];  Service: Orthopedics;  Laterality: Left;  Odilon tavera/ Braydon confirmed 5/21/21 [place]       FAMILY HISTORY    Family History   Problem Relation Name Age of Onset    Hypertension Mother Shantel     Seizures Mother Shantel     Fibromyalgia Mother Shantel     Arthritis Mother Shantel     Cancer Mother Shantel     Depression Mother Shantel     Hypertension Father Clifton     No Known Problems Sister      No Known Problems Sister      No Known Problems Sister      Breast cancer Maternal Grandmother      COPD Maternal Grandfather David     Seizures Brother      Seizures Brother      Cancer  Neg Hx      Heart disease Neg Hx      Kidney disease Neg Hx         SOCIAL HISTORY    Social History     Socioeconomic History    Marital status:      Spouse name: martine    Number of children: 3   Occupational History    Occupation: teacher     Employer: [employer]   Tobacco Use    Smoking status: Never    Smokeless tobacco: Never   Substance and Sexual Activity    Alcohol use: Not Currently     Comment: 2x/month    Drug use: Never    Sexual activity: Yes     Partners: Male     Birth control/protection: None   Social History Narrative    Lives with her  and 3 boys in [place]. 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 She has 2 dogs, yorkie, and granados doodle.     Social Determinants of Health     Financial Resource Strain: Low Risk  (12/22/2023)    Overall Financial Resource Strain (CARDIA)     Difficulty of Paying Living Expenses: Not hard at all   Food Insecurity: No Food Insecurity (12/22/2023)    Hunger Vital Sign     Worried About Running Out of Food in the Last Year: Never true     Ran Out of Food in the Last Year: Never true   Transportation Needs: No Transportation Needs (12/22/2023)    PRAPARE - Transportation     Lack of Transportation (Medical): No     Lack of Transportation (Non-Medical): No   Physical Activity: Insufficiently Active (12/22/2023)    Exercise Vital Sign     Days of Exercise per Week: 3 days     Minutes of Exercise per Session: 30 min   Stress: Stress Concern Present (12/22/2023)    [employer] - Occupational Stress Questionnaire     Feeling of Stress : To some extent   Housing Stability: Low Risk  (12/22/2023)    Housing Stability Vital Sign     Unable to Pay for Housing in the Last Year: No     Number of Places Lived in the Last Year: 1     Unstable Housing in the Last Year: No       MEDICATIONS  Current Outpatient Medications   Medication Instructions    AJOVY SYRINGE 225 mg, Subcutaneous, Every 28 days     "ALPRAZolam (XANAX) 0.25 mg, Oral, 3 times daily    DULoxetine (CYMBALTA) 30 mg, Oral, Daily    DULoxetine (CYMBALTA) 60 mg, Oral    estradioL (ESTRACE) 0.5 MG tablet Take 1 tablet by mouth daily    HYDROcodone-acetaminophen (NORCO) 5-325 mg per tablet 1 tablet, Oral, Every 6 hours PRN    ketorolac (TORADOL) 10 mg, Oral, Every 6 hours PRN    lisdexamfetamine (VYVANSE) 20 mg, Oral, Every morning    pregabalin (LYRICA) 100 mg, Oral, Daily    tamsulosin (FLOMAX) 0.4 mg, Oral, Daily    traZODone (DESYREL) 100 mg, Oral, Nightly    UBRELVY 100 mg, Oral, As needed (PRN), If symptoms persist or return, may repeat dose after 2 hours. Maximum: 200 mg per 24 hours       ALLERGIES    Review of patient's allergies indicates:   Allergen Reactions    Peaches [peach (prunus persica)] Hives, Itching and Swelling    Tramadol Other (See Comments)     headache         REVIEW OF SYSTEMS   Review of Systems       PHYSICAL EXAM    Reviewed Triage Note    VITAL SIGNS:   ED Triage Vitals [07/04/24 2120]   Enc Vitals Group      BP (!) 147/90      Pulse 108      Resp 20      Temp 97.4 °F (36.3 °C)      Temp Source Oral      SpO2 100 %      Weight 195 lb      Height 5' 4"      Head Circumference       Peak Flow       Pain Score       Pain Loc       Pain Education       Exclude from Growth Chart        Patient Vitals for the past 24 hrs:   BP Temp Temp src Pulse Resp SpO2 Height Weight   07/05/24 0202 118/78 97.9 °F (36.6 °C) Oral 87 19 100 % -- --   07/05/24 0130 -- -- -- -- -- 100 % -- --   07/05/24 0102 121/75 97.9 °F (36.6 °C) Oral 97 15 100 % -- --   07/05/24 0041 -- -- -- -- 16 -- -- --   07/05/24 0039 119/73 97.9 °F (36.6 °C) Oral 96 16 100 % -- --   07/05/24 0000 -- 97.9 °F (36.6 °C) Oral -- -- 100 % -- --   07/04/24 2300 -- -- -- -- -- 100 % -- --   07/04/24 2246 -- -- -- -- 16 -- -- --   07/04/24 2203 (!) 141/77 97.9 °F (36.6 °C) Oral 94 18 100 % -- --   07/04/24 2135 (!) 137/93 -- -- 96 18 100 % -- --   07/04/24 2120 (!) 147/90 97.4 " "°F (36.3 °C) Oral 108 20 100 % 5' 4" (1.626 m) 88.5 kg (195 lb)       Physical Exam    Nursing note and vitals reviewed.  Constitutional: She appears well-developed and well-nourished.   HENT:   Head: Normocephalic and atraumatic.   Eyes: EOM are normal. Pupils are equal, round, and reactive to light.   Neck: Neck supple.   Normal range of motion.  Cardiovascular:  Regular rhythm.   Tachycardia present.         Pulmonary/Chest: Breath sounds normal. No respiratory distress.   Abdominal: Abdomen is soft. She exhibits no distension. There is abdominal tenderness.   Left CVA tenderness.  Left upper quadrant and left mid abdominal tenderness with guarding. There is guarding. There is no rebound.   Musculoskeletal:         General: Normal range of motion.      Cervical back: Normal range of motion and neck supple.     Neurological: She is alert and oriented to person, place, and time.   Skin: Skin is warm and dry.   Psychiatric: She has a normal mood and affect.          EKG     Interpreted by EM physician if performed:               LABS  Pertinent labs reviewed. (See chart for details)   Labs Reviewed   CBC W/ AUTO DIFFERENTIAL - Abnormal; Notable for the following components:       Result Value    Immature Granulocytes 0.7 (*)     Gran # (ANC) 7.8 (*)     Immature Grans (Abs) 0.07 (*)     Mono # 1.2 (*)     Gran % 73.9 (*)     Lymph % 13.3 (*)     All other components within normal limits   COMPREHENSIVE METABOLIC PANEL - Abnormal; Notable for the following components:    CO2 21 (*)     BUN 19 (*)     All other components within normal limits   URINALYSIS, REFLEX TO URINE CULTURE - Abnormal; Notable for the following components:    Specific Gravity, UA >=1.030 (*)     Occult Blood UA Trace (*)     All other components within normal limits    Narrative:     Preferred Collection Type->Urine, Clean Catch  Specimen Source->Urine   LIPASE         RADIOLOGY          Imaging Results              CT Abdomen Pelvis With IV " Contrast NO Oral Contrast (Final result)  Result time 07/04/24 23:46:52      Final result by Jesenia Hernandez MD (07/04/24 23:46:52)                   Impression:      Left hydronephrosis likely related to faintly visualized punctate stone at the UPJ      Electronically signed by: Jesenia Hernandez  Date:    07/04/2024  Time:    23:46               Narrative:    EXAMINATION:  CT ABDOMEN PELVIS WITH IV CONTRAST    CLINICAL HISTORY:  LLQ abdominal pain;Nausea/vomiting;    TECHNIQUE:  Low dose axial images, sagittal and coronal reformations were obtained from the lung bases to the pubic symphysis following the IV administration of 100 mL of Omnipaque 350 iterative technique automated exposure control    COMPARISON:  06/13/2024    FINDINGS:  There is left hydronephrosis with possible punctate stone at the UPJ.  No right hydronephrosis.  Urinary bladder unremarkable    No obstructive or inflammatory bowel findings    Liver, pancreas and spleen stable.  Gallbladder present                                        PROCEDURES    Procedures      ED COURSE & MEDICAL DECISION MAKING    Pertinent Labs & Imaging studies reviewed. (See chart for details and specific orders.)          Summary of review of records:   Seen and treated here on June 13th for pyelonephritis, received cefpodoxime and Toradol at that time.  CT kidney stone steady was done which showed very small punctate stones in both kidneys.    Medical Decision Making  Amount and/or Complexity of Data Reviewed  External Data Reviewed: radiology and notes.  Labs: ordered. Decision-making details documented in ED Course.  Radiology: ordered and independent interpretation performed. Decision-making details documented in ED Course.    Risk  Prescription drug management.      Lesa Leyva is a 44 y.o. female who presents for nausea, vomiting abdominal pain starting earlier today.    Differential includes but is not limited to pancreatitis, diverticulitis, perforated  viscus, pyelonephritis, renal colic.          Medications   sodium chloride 0.9% bolus 1,000 mL 1,000 mL (0 mLs Intravenous Stopped 7/4/24 2256)   ondansetron injection 4 mg (4 mg Intravenous Given 7/4/24 2156)   ketorolac injection 9.999 mg (9.999 mg Intravenous Given 7/4/24 2156)   morphine injection 4 mg (4 mg Intravenous Given 7/4/24 2246)   iohexoL (OMNIPAQUE 350) injection 100 mL (100 mLs Intravenous Given 7/4/24 2323)   morphine injection 4 mg (4 mg Intravenous Given 7/5/24 0041)       ED Course as of 07/05/24 0354   u Jul 04, 2024 2234 Blood, UA(!): Trace [HL]   2234 Urinalysis, Reflex to Urine Culture Urine, Clean Catch(!)  No e/o infection [HL]   2234 Lipase Result: 215  within normal limits  [HL]   2234 WBC: 10.60  No leukocytosis-lift shift noted [HL]   2350 CT Abdomen Pelvis With IV Contrast NO Oral Contrast  Left hydronephrosis likely related to faintly visualized punctate stone at the UPJ [HL]      ED Course User Index  [HL] Ailyn Drake DO       All findings discussed with patient and  at bedside.  Likely has ureteral colic from punctate kidney stone.  Provided with supportive care for home.  Has a established with Urology in the past and does plan to follow up closely.  Strict return precautions for worsening pain, intractable vomiting or fevers.  All questions addressed and patient stable at time of discharge.      FINAL IMPRESSION    Final diagnoses:  [N23] Ureteral colic (Primary)  [N13.30] Hydronephrosis, left       DISPOSITION  Patient discharged in stable condition        ED Prescriptions       Medication Sig Dispense Start Date End Date Auth. Provider    ketorolac (TORADOL) 10 mg tablet Take 1 tablet (10 mg total) by mouth every 6 (six) hours as needed for Pain. 20 tablet 7/5/2024 7/10/2024 Ailyn Drake, DO    HYDROcodone-acetaminophen (NORCO) 5-325 mg per tablet Take 1 tablet by mouth every 6 (six) hours as needed for Pain. 12 tablet 7/5/2024 -- Ailyn Drake DO     tamsulosin (FLOMAX) 0.4 mg Cap Take 1 capsule (0.4 mg total) by mouth once daily. 10 capsule 7/5/2024 7/5/2025 Ailyn Drake DO          Follow-up Information       Follow up With Specialties Details Why Contact Info    Angel Hernandez MD Urology Schedule an appointment as soon as possible for a visit   [address] [address] [id number] [phone]      [facility] - Emergency Dept Emergency Medicine  As needed, If symptoms worsen [address] [facility] [address] [phone]              DISCLAIMER: This note was prepared with [employer] voice recognition transcription software. Garbled syntax, mangled pronouns, and other bizarre constructions may be attributed to that software system.             Ailyn Drake DO  07/05/24 0356

## 2024-07-07 ENCOUNTER — PATIENT MESSAGE (OUTPATIENT)
Dept: UROLOGY | Facility: CLINIC | Age: 44
End: 2024-07-07
Payer: COMMERCIAL

## 2024-07-11 ENCOUNTER — OFFICE VISIT (OUTPATIENT)
Dept: UROLOGY | Facility: CLINIC | Age: 44
End: 2024-07-11
Payer: COMMERCIAL

## 2024-07-11 VITALS
SYSTOLIC BLOOD PRESSURE: 139 MMHG | HEART RATE: 111 BPM | HEIGHT: 64 IN | BODY MASS INDEX: 34.25 KG/M2 | DIASTOLIC BLOOD PRESSURE: 85 MMHG | WEIGHT: 200.63 LBS

## 2024-07-11 DIAGNOSIS — N20.0 RENAL CALCULUS, LEFT: Primary | ICD-10-CM

## 2024-07-11 DIAGNOSIS — Z09 ENCOUNTER FOR EXAMINATION FOLLOWING TREATMENT AT HOSPITAL: ICD-10-CM

## 2024-07-11 DIAGNOSIS — N30.01 ACUTE CYSTITIS WITH HEMATURIA: ICD-10-CM

## 2024-07-11 DIAGNOSIS — N13.30 HYDRONEPHROSIS OF LEFT KIDNEY: ICD-10-CM

## 2024-07-11 DIAGNOSIS — N23 RENAL COLIC ON LEFT SIDE: ICD-10-CM

## 2024-07-11 DIAGNOSIS — R31.0 GROSS HEMATURIA: ICD-10-CM

## 2024-07-11 LAB
BILIRUB UR QL STRIP: NEGATIVE
CLARITY UR REFRACT.AUTO: CLEAR
COLOR UR AUTO: YELLOW
GLUCOSE UR QL STRIP: NEGATIVE
HGB UR QL STRIP: NEGATIVE
KETONES UR QL STRIP: NEGATIVE
LEUKOCYTE ESTERASE UR QL STRIP: NEGATIVE
NITRITE UR QL STRIP: NEGATIVE
PH UR STRIP: 6 [PH] (ref 5–8)
PROT UR QL STRIP: NEGATIVE
SP GR UR STRIP: 1.01 (ref 1–1.03)
URN SPEC COLLECT METH UR: NORMAL

## 2024-07-11 PROCEDURE — 87086 URINE CULTURE/COLONY COUNT: CPT | Performed by: NURSE PRACTITIONER

## 2024-07-11 PROCEDURE — 99214 OFFICE O/P EST MOD 30 MIN: CPT | Mod: S$GLB,,, | Performed by: NURSE PRACTITIONER

## 2024-07-11 PROCEDURE — 3008F BODY MASS INDEX DOCD: CPT | Mod: CPTII,S$GLB,, | Performed by: NURSE PRACTITIONER

## 2024-07-11 PROCEDURE — 3075F SYST BP GE 130 - 139MM HG: CPT | Mod: CPTII,S$GLB,, | Performed by: NURSE PRACTITIONER

## 2024-07-11 PROCEDURE — 1160F RVW MEDS BY RX/DR IN RCRD: CPT | Mod: CPTII,S$GLB,, | Performed by: NURSE PRACTITIONER

## 2024-07-11 PROCEDURE — 81003 URINALYSIS AUTO W/O SCOPE: CPT | Performed by: NURSE PRACTITIONER

## 2024-07-11 PROCEDURE — 1159F MED LIST DOCD IN RCRD: CPT | Mod: CPTII,S$GLB,, | Performed by: NURSE PRACTITIONER

## 2024-07-11 PROCEDURE — 3079F DIAST BP 80-89 MM HG: CPT | Mod: CPTII,S$GLB,, | Performed by: NURSE PRACTITIONER

## 2024-07-11 PROCEDURE — 99999 PR PBB SHADOW E&M-EST. PATIENT-LVL V: CPT | Mod: PBBFAC,,, | Performed by: NURSE PRACTITIONER

## 2024-07-11 RX ORDER — TAMSULOSIN HYDROCHLORIDE 0.4 MG/1
0.4 CAPSULE ORAL DAILY
Qty: 20 CAPSULE | Refills: 0 | Status: SHIPPED | OUTPATIENT
Start: 2024-07-11 | End: 2024-07-31

## 2024-07-11 RX ORDER — KETOROLAC TROMETHAMINE 10 MG/1
10 TABLET, FILM COATED ORAL EVERY 6 HOURS PRN
Qty: 20 TABLET | Refills: 0 | Status: SHIPPED | OUTPATIENT
Start: 2024-07-11 | End: 2024-07-16

## 2024-07-11 RX ORDER — CYCLOBENZAPRINE HCL 10 MG
10 TABLET ORAL 3 TIMES DAILY PRN
Qty: 30 TABLET | Refills: 0 | Status: SHIPPED | OUTPATIENT
Start: 2024-07-11 | End: 2024-07-21

## 2024-07-11 RX ORDER — AMOXICILLIN AND CLAVULANATE POTASSIUM 500; 125 MG/1; MG/1
1 TABLET, FILM COATED ORAL 3 TIMES DAILY
Qty: 30 TABLET | Refills: 0 | Status: SHIPPED | OUTPATIENT
Start: 2024-07-11 | End: 2024-07-21

## 2024-07-11 NOTE — PROGRESS NOTES
Subjective:       Patient ID: Lesa Leyva is a 44 y.o. female.    Chief Complaint: Nephrolithiasis (Hospital follow up)    Patient is a 43 yo WF who is here today for an ER follow-up for left renal calculus with mild left hydronephrosis that was noted on CT performed in the ER on 7/4/2024. Results discussed with patient. Patient denies passing any stone particles and still reports left flank pain that radiates around and down to LLQ of abdomen.     Flank Pain  This is a new problem. Episode onset: 7/4/2024. The problem occurs daily. The quality of the pain is described as aching (and pressure). Radiates to: LLQ abdomen. The pain is at a severity of 5/10. The pain is moderate. Associated symptoms include abdominal pain (LLQ), dysuria, a fever and hematuria. Risk factors include obesity and history of renal stones. Treatments tried: tamsulosin, toradol, and norco. The treatment provided mild relief.   Hematuria  This is a new problem. Episode onset: 7/4/2024. The problem has been gradually improving since onset. She describes the hematuria as gross hematuria. The hematuria occurs throughout her entire urinary stream. She reports no clotting in her urine stream. Her pain is at a severity of 5/10. The pain is moderate. She describes her urine color as light pink. Irritative symptoms include frequency and nocturia (x2). Irritative symptoms do not include urgency. Associated symptoms include abdominal pain (LLQ), chills, dysuria, fever, flank pain, nausea and vomiting (resolved). Her past medical history is significant for kidney stones and UTI. There is no history of  trauma.     Review of Systems   Constitutional:  Positive for chills, fatigue and fever.   Gastrointestinal:  Positive for abdominal pain (LLQ), nausea and vomiting (resolved). Negative for change in bowel habit.   Genitourinary:  Positive for dysuria, flank pain, frequency, hematuria and nocturia (x2). Negative for decreased urine volume,  difficulty urinating and urgency.   Neurological: Negative.    Psychiatric/Behavioral: Negative.           Objective:      Physical Exam  Vitals and nursing note reviewed.   Constitutional:       General: She is not in acute distress.     Appearance: She is well-developed. She is obese. She is not ill-appearing.   HENT:      Head: Normocephalic and atraumatic.   Eyes:      Pupils: Pupils are equal, round, and reactive to light.   Cardiovascular:      Rate and Rhythm: Normal rate.   Pulmonary:      Effort: Pulmonary effort is normal. No respiratory distress.   Abdominal:      Palpations: Abdomen is soft.      Tenderness: There is no abdominal tenderness.   Musculoskeletal:         General: Normal range of motion.      Cervical back: Normal range of motion.   Skin:     General: Skin is warm and dry.   Neurological:      Mental Status: She is alert and oriented to person, place, and time.      Coordination: Coordination normal.   Psychiatric:         Mood and Affect: Mood normal.         Behavior: Behavior normal.         Thought Content: Thought content normal.         Judgment: Judgment normal.         Assessment:       Problem List Items Addressed This Visit          Renal/    Renal calculus, left - Primary    Relevant Medications    tamsulosin (FLOMAX) 0.4 mg Cap    Other Relevant Orders    US Retroperitoneal Complete    Urine culture    Urinalysis    Renal colic on left side    Relevant Medications    cyclobenzaprine (FLEXERIL) 10 MG tablet    ketorolac (TORADOL) 10 mg tablet    Other Relevant Orders    US Retroperitoneal Complete    Urine culture    Urinalysis     Other Visit Diagnoses       Hydronephrosis of left kidney        Relevant Orders    US Retroperitoneal Complete    Urine culture    Urinalysis    Encounter for examination following treatment at hospital        Acute cystitis with hematuria        Relevant Medications    amoxicillin-clavulanate 500-125mg (AUGMENTIN) 500-125 mg Tab    Other Relevant  Orders    Urine culture    Urinalysis    Gross hematuria        Relevant Orders    Urine culture    Urinalysis            Plan:           Lesa was seen today for nephrolithiasis.    Diagnoses and all orders for this visit:    Renal calculus, left  -     tamsulosin (FLOMAX) 0.4 mg Cap; Take 1 capsule (0.4 mg total) by mouth once daily. for 20 days  -     US Retroperitoneal Complete; Future  -     Urine culture  -     Urinalysis    Renal colic on left side  -     US Retroperitoneal Complete; Future  -     Urine culture  -     Urinalysis  -     cyclobenzaprine (FLEXERIL) 10 MG tablet; Take 1 tablet (10 mg total) by mouth 3 (three) times daily as needed for Muscle spasms.  -     ketorolac (TORADOL) 10 mg tablet; Take 1 tablet (10 mg total) by mouth every 6 (six) hours as needed for Pain.    Hydronephrosis of left kidney  -     US Retroperitoneal Complete; Future  -     Urine culture  -     Urinalysis    Encounter for examination following treatment at hospital    Acute cystitis with hematuria  -     Urine culture  -     Urinalysis  -     amoxicillin-clavulanate 500-125mg (AUGMENTIN) 500-125 mg Tab; Take 1 tablet (500 mg total) by mouth 3 (three) times daily. for 10 days    Gross hematuria  -     Urine culture  -     Urinalysis    Other orders  Continue taking tamsulosin and increase water intake to promote passage of stone.   Continue taking pain medications (Norco and toradol) as previously prescribed.    Follow-up in 3 weeks with U/S 1-2 days prior to f/u appt.     Soren March, DNP

## 2024-07-11 NOTE — PATIENT INSTRUCTIONS
U/A and urine cx today  Continue taking tamsulosin and increase water intake to promote passage of stone.   Continue taking pain medications (Norco and toradol) as previously prescribed.  Follow-up in 3 weeks with U/S 1-2 days prior to f/u appt.

## 2024-07-12 LAB — BACTERIA UR CULT: NORMAL

## 2024-07-15 ENCOUNTER — TELEPHONE (OUTPATIENT)
Dept: UROLOGY | Facility: CLINIC | Age: 44
End: 2024-07-15
Payer: COMMERCIAL

## 2024-07-15 NOTE — TELEPHONE ENCOUNTER
----- Message from Soren March NP sent at 7/14/2024  8:52 PM CDT -----  Please inform patient via telephone that her urine cx was normal. She does not have a UTI. Continue with plan of care and keep follow-up appt.

## 2024-07-25 ENCOUNTER — PATIENT MESSAGE (OUTPATIENT)
Dept: UROLOGY | Facility: CLINIC | Age: 44
End: 2024-07-25
Payer: COMMERCIAL

## 2024-07-26 ENCOUNTER — HOSPITAL ENCOUNTER (OUTPATIENT)
Dept: RADIOLOGY | Facility: HOSPITAL | Age: 44
Discharge: HOME | End: 2024-07-26
Attending: NURSE PRACTITIONER
Payer: COMMERCIAL

## 2024-07-26 ENCOUNTER — PATIENT MESSAGE (OUTPATIENT)
Dept: UROLOGY | Facility: CLINIC | Age: 44
End: 2024-07-26
Payer: COMMERCIAL

## 2024-07-26 DIAGNOSIS — N23 RENAL COLIC ON LEFT SIDE: ICD-10-CM

## 2024-07-26 DIAGNOSIS — N13.30 HYDRONEPHROSIS OF LEFT KIDNEY: ICD-10-CM

## 2024-07-26 DIAGNOSIS — N20.0 RENAL CALCULUS, LEFT: ICD-10-CM

## 2024-07-26 DIAGNOSIS — N23 RENAL COLIC ON LEFT SIDE: Primary | ICD-10-CM

## 2024-07-26 PROCEDURE — 76770 US EXAM ABDO BACK WALL COMP: CPT | Mod: TC,PN

## 2024-07-26 PROCEDURE — 76770 US EXAM ABDO BACK WALL COMP: CPT | Mod: 26,,, | Performed by: RADIOLOGY

## 2024-07-26 RX ORDER — ETODOLAC 500 MG/1
500 TABLET, FILM COATED ORAL 2 TIMES DAILY PRN
Qty: 20 TABLET | Refills: 0 | Status: SHIPPED | OUTPATIENT
Start: 2024-07-26 | End: 2024-08-05

## 2024-07-26 RX ORDER — KETOROLAC TROMETHAMINE 10 MG/1
10 TABLET, FILM COATED ORAL EVERY 6 HOURS PRN
Qty: 20 TABLET | Refills: 0 | Status: SHIPPED | OUTPATIENT
Start: 2024-07-26 | End: 2024-07-26

## 2024-07-29 ENCOUNTER — PATIENT MESSAGE (OUTPATIENT)
Dept: UROLOGY | Facility: CLINIC | Age: 44
End: 2024-07-29
Payer: COMMERCIAL

## 2024-07-29 ENCOUNTER — TELEPHONE (OUTPATIENT)
Dept: UROLOGY | Facility: CLINIC | Age: 44
End: 2024-07-29
Payer: COMMERCIAL

## 2024-07-29 NOTE — TELEPHONE ENCOUNTER
----- Message from Soren March DNP sent at 7/29/2024  3:49 PM CDT -----  Please inform patient via telephone that her U/S of kidneys and bladder only showed a very tiny left renal stone. Prior CT from 7/4/2024 showed two tiny left renal stones. Find out if patient passed a stone and send me an update. No hydronephrosis was noted on U/S.

## 2024-07-29 NOTE — TELEPHONE ENCOUNTER
Spoke to patient and notified her of results, patient did state she passed one stone, she inquired about stone size being 7mm, I informed her that the 7mm measurement noted was incorrect and stone is still the same size as previous imaging per provider.

## 2024-08-02 DIAGNOSIS — F90.9 ATTENTION DEFICIT HYPERACTIVITY DISORDER (ADHD), UNSPECIFIED ADHD TYPE: ICD-10-CM

## 2024-08-02 RX ORDER — LISDEXAMFETAMINE DIMESYLATE CAPSULES 20 MG/1
20 CAPSULE ORAL EVERY MORNING
Qty: 30 CAPSULE | Refills: 0 | Status: SHIPPED | OUTPATIENT
Start: 2024-08-02

## 2024-08-02 NOTE — TELEPHONE ENCOUNTER
No care due was identified.  [facility] Embedded Care Due Messages. Reference number: [id number].   8/02/2024 5:05:59 AM CDT

## 2024-08-21 RX ORDER — PREGABALIN 100 MG/1
100 CAPSULE ORAL DAILY
Qty: 90 CAPSULE | Refills: 0 | Status: SHIPPED | OUTPATIENT
Start: 2024-08-21

## 2024-08-21 NOTE — TELEPHONE ENCOUNTER
No care due was identified.  [facility] Embedded Care Due Messages. Reference number: [id number].   8/21/2024 5:21:54 AM CDT

## 2024-09-04 DIAGNOSIS — F90.9 ATTENTION DEFICIT HYPERACTIVITY DISORDER (ADHD), UNSPECIFIED ADHD TYPE: ICD-10-CM

## 2024-09-04 RX ORDER — LISDEXAMFETAMINE DIMESYLATE 20 MG/1
20 CAPSULE ORAL EVERY MORNING
Qty: 30 CAPSULE | Refills: 0 | Status: SHIPPED | OUTPATIENT
Start: 2024-09-04

## 2024-09-04 NOTE — TELEPHONE ENCOUNTER
No care due was identified.  [facility] Embedded Care Due Messages. Reference number: [id number].   9/04/2024 8:44:32 AM CDT

## 2024-09-09 DIAGNOSIS — F32.A ANXIETY AND DEPRESSION: ICD-10-CM

## 2024-09-09 DIAGNOSIS — F41.9 ANXIETY AND DEPRESSION: ICD-10-CM

## 2024-09-09 RX ORDER — ALPRAZOLAM 0.25 MG/1
0.25 TABLET ORAL 3 TIMES DAILY
Qty: 30 TABLET | Refills: 0 | Status: SHIPPED | OUTPATIENT
Start: 2024-09-09 | End: 2024-10-09

## 2024-09-09 NOTE — TELEPHONE ENCOUNTER
No care due was identified.  [facility] Embedded Care Due Messages. Reference number: [id number].   9/09/2024 8:26:48 AM CDT

## 2024-09-23 ENCOUNTER — OFFICE VISIT (OUTPATIENT)
Dept: UROLOGY | Facility: CLINIC | Age: 44
End: 2024-09-23
Payer: COMMERCIAL

## 2024-09-23 VITALS
DIASTOLIC BLOOD PRESSURE: 77 MMHG | WEIGHT: 199.06 LBS | SYSTOLIC BLOOD PRESSURE: 120 MMHG | BODY MASS INDEX: 33.98 KG/M2 | HEIGHT: 64 IN | HEART RATE: 103 BPM

## 2024-09-23 DIAGNOSIS — N30.01 ACUTE CYSTITIS WITH HEMATURIA: Primary | ICD-10-CM

## 2024-09-23 PROCEDURE — 99999 PR PBB SHADOW E&M-EST. PATIENT-LVL IV: CPT | Mod: PBBFAC,,, | Performed by: NURSE PRACTITIONER

## 2024-09-23 PROCEDURE — 99214 OFFICE O/P EST MOD 30 MIN: CPT | Mod: S$GLB,,, | Performed by: NURSE PRACTITIONER

## 2024-09-23 PROCEDURE — 1160F RVW MEDS BY RX/DR IN RCRD: CPT | Mod: CPTII,S$GLB,, | Performed by: NURSE PRACTITIONER

## 2024-09-23 PROCEDURE — 1159F MED LIST DOCD IN RCRD: CPT | Mod: CPTII,S$GLB,, | Performed by: NURSE PRACTITIONER

## 2024-09-23 PROCEDURE — 3074F SYST BP LT 130 MM HG: CPT | Mod: CPTII,S$GLB,, | Performed by: NURSE PRACTITIONER

## 2024-09-23 PROCEDURE — 3008F BODY MASS INDEX DOCD: CPT | Mod: CPTII,S$GLB,, | Performed by: NURSE PRACTITIONER

## 2024-09-23 PROCEDURE — 3078F DIAST BP <80 MM HG: CPT | Mod: CPTII,S$GLB,, | Performed by: NURSE PRACTITIONER

## 2024-09-23 RX ORDER — SULFAMETHOXAZOLE AND TRIMETHOPRIM 800; 160 MG/1; MG/1
1 TABLET ORAL EVERY 12 HOURS
COMMUNITY
Start: 2024-09-15

## 2024-09-23 NOTE — PATIENT INSTRUCTIONS
Finish antibiotic therapy (Bactrim DS) as prescribed by urgent care.  Repeat urine cx and u/a after completion of antibiotic therapy. Home collect; drop urine specimen off to any [facility] outpatient lab near you.   Follow-up pending u/a and urine cx results.

## 2024-09-23 NOTE — PROGRESS NOTES
Subjective:       Patient ID: Lesa Leyva is a 44 y.o. female.    Chief Complaint: Abnormal Urine Results    Patient is here today for an urgent care follow-up for UTI and high levels of bilirubin in her urine. She had c/o bladder spasms, bilateral flank pain, dysuria, and tea colored urine. Patient reports passing a stone since her follow-up imaging in 7/2024. She reports her recent flank pain was different than her normal kidney stone pain and does not think it was kidney stones. She was prescribed Bactrim DS bid x10 days by urgent care and is feeling 90% back to normal.       Review of Systems   Constitutional:  Positive for fatigue. Negative for chills and fever.   Gastrointestinal:  Positive for nausea. Negative for abdominal pain, change in bowel habit and vomiting.   Genitourinary:  Positive for dysuria, flank pain (bilateral), hematuria (tea colored), nocturia (x1) and urgency. Negative for decreased urine volume, difficulty urinating and frequency.        Bladder spasms   Psychiatric/Behavioral: Negative.           Objective:      Physical Exam  Vitals and nursing note reviewed.   Constitutional:       General: She is not in acute distress.     Appearance: She is well-developed. She is obese. She is not ill-appearing.   HENT:      Head: Normocephalic and atraumatic.   Eyes:      Pupils: Pupils are equal, round, and reactive to light.   Cardiovascular:      Rate and Rhythm: Normal rate.   Pulmonary:      Effort: Pulmonary effort is normal. No respiratory distress.   Abdominal:      Palpations: Abdomen is soft.      Tenderness: There is no abdominal tenderness.   Musculoskeletal:         General: Normal range of motion.      Cervical back: Normal range of motion.   Skin:     General: Skin is warm and dry.   Neurological:      Mental Status: She is alert and oriented to person, place, and time.      Coordination: Coordination normal.   Psychiatric:         Mood and Affect: Mood normal.         Behavior:  Behavior normal.         Thought Content: Thought content normal.         Judgment: Judgment normal.         Assessment:       Problem List Items Addressed This Visit    None  Visit Diagnoses       Acute cystitis with hematuria    -  Primary    Relevant Orders    Urinalysis    Urine culture            Plan:           Lesa was seen today for abnormal urine results.    Diagnoses and all orders for this visit:    Acute cystitis with hematuria  -     Urinalysis; Future  -     Urine culture; Future    Other orders  Finish antibiotic therapy (Bactrim DS) as prescribed by urgent care.  Repeat urine cx and u/a after completion of antibiotic therapy. Home collect; drop urine specimen off to any [facility] outpatient lab near you.   Follow-up pending u/a and urine cx results.     Soren March, DNP

## 2024-10-04 ENCOUNTER — TELEPHONE (OUTPATIENT)
Dept: UROLOGY | Facility: CLINIC | Age: 44
End: 2024-10-04
Payer: COMMERCIAL

## 2024-10-04 DIAGNOSIS — F90.9 ATTENTION DEFICIT HYPERACTIVITY DISORDER (ADHD), UNSPECIFIED ADHD TYPE: ICD-10-CM

## 2024-10-04 RX ORDER — LISDEXAMFETAMINE DIMESYLATE 20 MG/1
20 CAPSULE ORAL EVERY MORNING
Qty: 30 CAPSULE | Refills: 0 | Status: SHIPPED | OUTPATIENT
Start: 2024-10-04

## 2024-10-04 NOTE — TELEPHONE ENCOUNTER
No care due was identified.  Health [facility] Embedded Care Due Messages. Reference number: [id number].   10/04/2024 5:09:02 AM CDT

## 2024-10-04 NOTE — TELEPHONE ENCOUNTER
----- Message from Soren March DNP sent at 10/4/2024  1:57 PM CDT -----  Please inform patient via telephone that her urine cx showed some bacteria but none in predominance to diagnose a UTI. This means urine sample was contaminated. Repeat urine cx only if urinary symptoms are present and re-educate patient on clean catch mid-stream urine specimen collection. Thanks.

## 2024-10-08 ENCOUNTER — TELEPHONE (OUTPATIENT)
Dept: UROLOGY | Facility: CLINIC | Age: 44
End: 2024-10-08
Payer: COMMERCIAL

## 2024-10-08 DIAGNOSIS — F51.01 PRIMARY INSOMNIA: ICD-10-CM

## 2024-10-08 RX ORDER — TRAZODONE HYDROCHLORIDE 100 MG/1
100 TABLET ORAL NIGHTLY
Qty: 90 TABLET | Refills: 3 | Status: SHIPPED | OUTPATIENT
Start: 2024-10-08

## 2024-10-08 NOTE — TELEPHONE ENCOUNTER
No care due was identified.  [facility] Embedded Care Due Messages. Reference number: [id number].   10/08/2024 12:22:07 AM CDT

## 2024-10-23 ENCOUNTER — LAB VISIT (OUTPATIENT)
Dept: LAB | Facility: HOSPITAL | Age: 44
End: 2024-10-23
Attending: STUDENT IN AN ORGANIZED HEALTH CARE EDUCATION/TRAINING PROGRAM
Payer: COMMERCIAL

## 2024-10-23 ENCOUNTER — OFFICE VISIT (OUTPATIENT)
Dept: PRIMARY CARE CLINIC | Facility: CLINIC | Age: 44
End: 2024-10-23
Payer: COMMERCIAL

## 2024-10-23 VITALS
HEART RATE: 98 BPM | SYSTOLIC BLOOD PRESSURE: 124 MMHG | BODY MASS INDEX: 34.33 KG/M2 | DIASTOLIC BLOOD PRESSURE: 82 MMHG | WEIGHT: 201.06 LBS | HEIGHT: 64 IN | OXYGEN SATURATION: 97 %

## 2024-10-23 DIAGNOSIS — R53.83 FATIGUE, UNSPECIFIED TYPE: ICD-10-CM

## 2024-10-23 DIAGNOSIS — L65.9 HAIR LOSS: ICD-10-CM

## 2024-10-23 DIAGNOSIS — R53.83 FATIGUE, UNSPECIFIED TYPE: Primary | ICD-10-CM

## 2024-10-23 DIAGNOSIS — F32.4 MAJOR DEPRESSIVE DISORDER IN PARTIAL REMISSION, UNSPECIFIED WHETHER RECURRENT: ICD-10-CM

## 2024-10-23 LAB
25(OH)D3+25(OH)D2 SERPL-MCNC: 31 NG/ML (ref 30–96)
ALBUMIN SERPL BCP-MCNC: 4 G/DL (ref 3.5–5.2)
ALP SERPL-CCNC: 79 U/L (ref 40–150)
ALT SERPL W/O P-5'-P-CCNC: 15 U/L (ref 10–44)
ANION GAP SERPL CALC-SCNC: 9 MMOL/L (ref 8–16)
AST SERPL-CCNC: 22 U/L (ref 10–40)
BASOPHILS # BLD AUTO: 0.05 K/UL (ref 0–0.2)
BASOPHILS NFR BLD: 0.7 % (ref 0–1.9)
BILIRUB SERPL-MCNC: 0.2 MG/DL (ref 0.1–1)
BUN SERPL-MCNC: 14 MG/DL (ref 6–20)
CALCIUM SERPL-MCNC: 9.6 MG/DL (ref 8.7–10.5)
CHLORIDE SERPL-SCNC: 105 MMOL/L (ref 95–110)
CO2 SERPL-SCNC: 26 MMOL/L (ref 23–29)
CREAT SERPL-MCNC: 0.8 MG/DL (ref 0.5–1.4)
DIFFERENTIAL METHOD BLD: ABNORMAL
EOSINOPHIL # BLD AUTO: 0.1 K/UL (ref 0–0.5)
EOSINOPHIL NFR BLD: 0.8 % (ref 0–8)
ERYTHROCYTE [DISTWIDTH] IN BLOOD BY AUTOMATED COUNT: 12.5 % (ref 11.5–14.5)
EST. GFR  (NO RACE VARIABLE): >60 ML/MIN/1.73 M^2
FERRITIN SERPL-MCNC: 71 NG/ML (ref 20–300)
FOLATE SERPL-MCNC: 11.9 NG/ML (ref 4–24)
GLUCOSE SERPL-MCNC: 87 MG/DL (ref 70–110)
HCT VFR BLD AUTO: 41.1 % (ref 37–48.5)
HGB BLD-MCNC: 13.2 G/DL (ref 12–16)
IMM GRANULOCYTES # BLD AUTO: 0.06 K/UL (ref 0–0.04)
IMM GRANULOCYTES NFR BLD AUTO: 0.8 % (ref 0–0.5)
IRON SERPL-MCNC: 38 UG/DL (ref 30–160)
LYMPHOCYTES # BLD AUTO: 2 K/UL (ref 1–4.8)
LYMPHOCYTES NFR BLD: 26.8 % (ref 18–48)
MAGNESIUM SERPL-MCNC: 2.1 MG/DL (ref 1.6–2.6)
MCH RBC QN AUTO: 29.5 PG (ref 27–31)
MCHC RBC AUTO-ENTMCNC: 32.1 G/DL (ref 32–36)
MCV RBC AUTO: 92 FL (ref 82–98)
MONOCYTES # BLD AUTO: 0.7 K/UL (ref 0.3–1)
MONOCYTES NFR BLD: 9.8 % (ref 4–15)
NEUTROPHILS # BLD AUTO: 4.5 K/UL (ref 1.8–7.7)
NEUTROPHILS NFR BLD: 61.1 % (ref 38–73)
NRBC BLD-RTO: 0 /100 WBC
PLATELET # BLD AUTO: 250 K/UL (ref 150–450)
PMV BLD AUTO: 12.3 FL (ref 9.2–12.9)
POTASSIUM SERPL-SCNC: 4 MMOL/L (ref 3.5–5.1)
PROT SERPL-MCNC: 7.4 G/DL (ref 6–8.4)
RBC # BLD AUTO: 4.47 M/UL (ref 4–5.4)
SATURATED IRON: 11 % (ref 20–50)
SODIUM SERPL-SCNC: 140 MMOL/L (ref 136–145)
TOTAL IRON BINDING CAPACITY: 355 UG/DL (ref 250–450)
TRANSFERRIN SERPL-MCNC: 240 MG/DL (ref 200–375)
TSH SERPL DL<=0.005 MIU/L-ACNC: 0.51 UIU/ML (ref 0.4–4)
VIT B12 SERPL-MCNC: 294 PG/ML (ref 210–950)
WBC # BLD AUTO: 7.43 K/UL (ref 3.9–12.7)

## 2024-10-23 PROCEDURE — 84207 ASSAY OF VITAMIN B-6: CPT | Performed by: STUDENT IN AN ORGANIZED HEALTH CARE EDUCATION/TRAINING PROGRAM

## 2024-10-23 PROCEDURE — 3079F DIAST BP 80-89 MM HG: CPT | Mod: CPTII,S$GLB,, | Performed by: STUDENT IN AN ORGANIZED HEALTH CARE EDUCATION/TRAINING PROGRAM

## 2024-10-23 PROCEDURE — 82306 VITAMIN D 25 HYDROXY: CPT | Performed by: STUDENT IN AN ORGANIZED HEALTH CARE EDUCATION/TRAINING PROGRAM

## 2024-10-23 PROCEDURE — 1160F RVW MEDS BY RX/DR IN RCRD: CPT | Mod: CPTII,S$GLB,, | Performed by: STUDENT IN AN ORGANIZED HEALTH CARE EDUCATION/TRAINING PROGRAM

## 2024-10-23 PROCEDURE — 83735 ASSAY OF MAGNESIUM: CPT | Performed by: STUDENT IN AN ORGANIZED HEALTH CARE EDUCATION/TRAINING PROGRAM

## 2024-10-23 PROCEDURE — 82607 VITAMIN B-12: CPT | Performed by: STUDENT IN AN ORGANIZED HEALTH CARE EDUCATION/TRAINING PROGRAM

## 2024-10-23 PROCEDURE — 84466 ASSAY OF TRANSFERRIN: CPT | Performed by: STUDENT IN AN ORGANIZED HEALTH CARE EDUCATION/TRAINING PROGRAM

## 2024-10-23 PROCEDURE — 99999 PR PBB SHADOW E&M-EST. PATIENT-LVL III: CPT | Mod: PBBFAC,,, | Performed by: STUDENT IN AN ORGANIZED HEALTH CARE EDUCATION/TRAINING PROGRAM

## 2024-10-23 PROCEDURE — 3008F BODY MASS INDEX DOCD: CPT | Mod: CPTII,S$GLB,, | Performed by: STUDENT IN AN ORGANIZED HEALTH CARE EDUCATION/TRAINING PROGRAM

## 2024-10-23 PROCEDURE — 1159F MED LIST DOCD IN RCRD: CPT | Mod: CPTII,S$GLB,, | Performed by: STUDENT IN AN ORGANIZED HEALTH CARE EDUCATION/TRAINING PROGRAM

## 2024-10-23 PROCEDURE — 99214 OFFICE O/P EST MOD 30 MIN: CPT | Mod: S$GLB,,, | Performed by: STUDENT IN AN ORGANIZED HEALTH CARE EDUCATION/TRAINING PROGRAM

## 2024-10-23 PROCEDURE — 82746 ASSAY OF FOLIC ACID SERUM: CPT | Performed by: STUDENT IN AN ORGANIZED HEALTH CARE EDUCATION/TRAINING PROGRAM

## 2024-10-23 PROCEDURE — 83540 ASSAY OF IRON: CPT | Performed by: STUDENT IN AN ORGANIZED HEALTH CARE EDUCATION/TRAINING PROGRAM

## 2024-10-23 PROCEDURE — 84443 ASSAY THYROID STIM HORMONE: CPT | Performed by: STUDENT IN AN ORGANIZED HEALTH CARE EDUCATION/TRAINING PROGRAM

## 2024-10-23 PROCEDURE — 80053 COMPREHEN METABOLIC PANEL: CPT | Performed by: STUDENT IN AN ORGANIZED HEALTH CARE EDUCATION/TRAINING PROGRAM

## 2024-10-23 PROCEDURE — 82728 ASSAY OF FERRITIN: CPT | Performed by: STUDENT IN AN ORGANIZED HEALTH CARE EDUCATION/TRAINING PROGRAM

## 2024-10-23 PROCEDURE — 3074F SYST BP LT 130 MM HG: CPT | Mod: CPTII,S$GLB,, | Performed by: STUDENT IN AN ORGANIZED HEALTH CARE EDUCATION/TRAINING PROGRAM

## 2024-10-23 PROCEDURE — 85025 COMPLETE CBC W/AUTO DIFF WBC: CPT | Performed by: STUDENT IN AN ORGANIZED HEALTH CARE EDUCATION/TRAINING PROGRAM

## 2024-10-23 NOTE — PROGRESS NOTES
Subjective:       Patient ID: Lesa Leyva is a 44 y.o. female.   Chief Complaint: Follow-up    PCP: Dr. Cramen Ugalde    History of Present Illness            Hair thinning/ falling out for abut the past 2 months throughout the scalp.  Fatigue-Endorses increased fatigue over the past several months. No changes in activities, diet, or sleep. No medication changes. No recent illnesses.  No fevers.     MDD: Cymbalta 90 mg daily, tolerating without issues    Review of Systems   Constitutional:  Positive for fatigue. Negative for activity change and unexpected weight change.   HENT:  Negative for hearing loss, rhinorrhea and trouble swallowing.    Eyes:  Negative for discharge and visual disturbance.   Respiratory:  Negative for chest tightness and wheezing.    Cardiovascular:  Negative for chest pain and palpitations.   Gastrointestinal:  Negative for blood in stool, constipation, diarrhea and vomiting.   Endocrine: Negative for polydipsia and polyuria.   Genitourinary:  Negative for difficulty urinating, dysuria, hematuria and menstrual problem.   Musculoskeletal:  Positive for arthralgias. Negative for joint swelling and neck pain.   Neurological:  Negative for headaches.   Psychiatric/Behavioral:  Positive for dysphoric mood.               Objective:        Physical Exam  HENT:      Head: Normocephalic and atraumatic.      Nose: Nose normal.      Mouth/Throat:      Mouth: Mucous membranes are moist.      Pharynx: Oropharynx is clear.   Eyes:      Extraocular Movements: Extraocular movements intact.      Conjunctiva/sclera: Conjunctivae normal.      Pupils: Pupils are equal, round, and reactive to light.   Pulmonary:      Effort: Pulmonary effort is normal.   Musculoskeletal:         General: No swelling. Normal range of motion.      Cervical back: Normal range of motion.      Right lower leg: No edema.      Left lower leg: No edema.   Skin:     General: Skin is warm.      Findings: No lesion or rash.    Neurological:      General: No focal deficit present.      Mental Status: She is alert and oriented to person, place, and time.      Motor: No weakness.   Psychiatric:         Mood and Affect: Mood normal.         Thought Content: Thought content normal.         Assessment:         Problem List Items Addressed This Visit          Psychiatric    Major depressive disorder in partial remission, unspecified whether recurrent     Other Visit Diagnoses       Fatigue, unspecified type    -  Primary    Relevant Orders    Magnesium    CBC Auto Differential    Comprehensive Metabolic Panel    Ferritin    Iron and TIBC    Vitamin B6    Folate    Vitamin B12    Vitamin D    TSH    Hair loss                  Plan:         1. Fatigue, unspecified type  -     Magnesium; Future; Expected date: 10/23/2024  -     CBC Auto Differential; Future; Expected date: 10/23/2024  -     Comprehensive Metabolic Panel; Future; Expected date: 10/23/2024  -     Ferritin; Future; Expected date: 10/23/2024  -     Iron and TIBC; Future; Expected date: 10/23/2024  -     Vitamin B6; Future; Expected date: 10/23/2024  -     Folate; Future; Expected date: 10/23/2024  -     Vitamin B12; Future; Expected date: 10/23/2024  -     Vitamin D; Future; Expected date: 10/23/2024  -     TSH; Future; Expected date: 10/23/2024    2. Hair loss  Plan per 1    3. Major depressive disorder in partial remission, unspecified whether recurrent  Stable on medications, continue regimen          Shanna Foster MD

## 2024-10-29 LAB — PYRIDOXAL SERPL-MCNC: 20 UG/L (ref 5–50)

## 2024-11-04 DIAGNOSIS — F90.9 ATTENTION DEFICIT HYPERACTIVITY DISORDER (ADHD), UNSPECIFIED ADHD TYPE: ICD-10-CM

## 2024-11-04 DIAGNOSIS — F32.A ANXIETY AND DEPRESSION: ICD-10-CM

## 2024-11-04 DIAGNOSIS — F41.9 ANXIETY AND DEPRESSION: ICD-10-CM

## 2024-11-04 NOTE — TELEPHONE ENCOUNTER
No care due was identified.  Health [facility] Embedded Care Due Messages. Reference number: [id number].   11/04/2024 4:23:40 PM CST

## 2024-11-04 NOTE — TELEPHONE ENCOUNTER
No care due was identified.  [facility] Embedded Care Due Messages. Reference number: [id number].   11/04/2024 4:24:07 PM CST

## 2024-11-05 RX ORDER — LISDEXAMFETAMINE DIMESYLATE 20 MG/1
20 CAPSULE ORAL EVERY MORNING
Qty: 30 CAPSULE | Refills: 0 | Status: SHIPPED | OUTPATIENT
Start: 2024-11-05

## 2024-11-05 RX ORDER — ALPRAZOLAM 0.25 MG/1
0.25 TABLET ORAL 3 TIMES DAILY
Qty: 30 TABLET | Refills: 0 | Status: SHIPPED | OUTPATIENT
Start: 2024-11-05 | End: 2024-12-05

## 2024-11-21 DIAGNOSIS — G62.9 NEUROPATHY: Primary | ICD-10-CM

## 2024-11-21 RX ORDER — PREGABALIN 100 MG/1
100 CAPSULE ORAL DAILY
Qty: 90 CAPSULE | Refills: 3 | Status: SHIPPED | OUTPATIENT
Start: 2024-11-21

## 2024-12-07 DIAGNOSIS — F90.9 ATTENTION DEFICIT HYPERACTIVITY DISORDER (ADHD), UNSPECIFIED ADHD TYPE: ICD-10-CM

## 2024-12-08 NOTE — TELEPHONE ENCOUNTER
No care due was identified.  [facility] Embedded Care Due Messages. Reference number: [id number].   12/07/2024 7:29:29 PM CST

## 2024-12-09 RX ORDER — LISDEXAMFETAMINE DIMESYLATE 20 MG/1
20 CAPSULE ORAL EVERY MORNING
Qty: 30 CAPSULE | Refills: 0 | Status: SHIPPED | OUTPATIENT
Start: 2024-12-09

## 2024-12-11 ENCOUNTER — PATIENT MESSAGE (OUTPATIENT)
Dept: UROLOGY | Facility: CLINIC | Age: 44
End: 2024-12-11
Payer: COMMERCIAL

## 2024-12-11 DIAGNOSIS — N30.01 ACUTE CYSTITIS WITH HEMATURIA: Primary | ICD-10-CM

## 2024-12-16 ENCOUNTER — TELEPHONE (OUTPATIENT)
Dept: UROLOGY | Facility: CLINIC | Age: 44
End: 2024-12-16
Payer: COMMERCIAL

## 2024-12-16 DIAGNOSIS — N30.00 ACUTE CYSTITIS WITHOUT HEMATURIA: Primary | ICD-10-CM

## 2024-12-16 RX ORDER — NITROFURANTOIN 25; 75 MG/1; MG/1
100 CAPSULE ORAL 2 TIMES DAILY
Qty: 20 CAPSULE | Refills: 0 | Status: SHIPPED | OUTPATIENT
Start: 2024-12-16 | End: 2024-12-26

## 2024-12-16 NOTE — TELEPHONE ENCOUNTER
I called and left  for patient with urine results and instructions for f/u. Also sent a [employer] message. Left my name and callback number for questions.

## 2024-12-30 ENCOUNTER — PATIENT MESSAGE (OUTPATIENT)
Dept: UROLOGY | Facility: CLINIC | Age: 44
End: 2024-12-30
Payer: COMMERCIAL

## 2024-12-30 ENCOUNTER — TELEPHONE (OUTPATIENT)
Dept: UROLOGY | Facility: CLINIC | Age: 44
End: 2024-12-30
Payer: COMMERCIAL

## 2024-12-30 DIAGNOSIS — N30.00 ACUTE CYSTITIS WITHOUT HEMATURIA: Primary | ICD-10-CM

## 2024-12-30 RX ORDER — NITROFURANTOIN 25; 75 MG/1; MG/1
100 CAPSULE ORAL 2 TIMES DAILY
Qty: 20 CAPSULE | Refills: 0 | Status: SHIPPED | OUTPATIENT
Start: 2024-12-30 | End: 2025-01-09

## 2024-12-30 NOTE — PROGRESS NOTES
Diagnoses and all orders for this visit:    Acute cystitis without hematuria  -     nitrofurantoin, macrocrystal-monohydrate, (MACROBID) 100 MG capsule; Take 1 capsule (100 mg total) by mouth 2 (two) times daily. for 10 days    Other order  Nurse visit needed for a Rocephin 1 gram IM x1 dose injection for recurrent UTI.    Soren March, DNP       oral

## 2024-12-30 NOTE — TELEPHONE ENCOUNTER
----- Message from Soren March DNP sent at 12/30/2024  1:06 PM CST -----  Please inform patient via telephone that her urine cx came back positive for UTI. Script for Macrobid sent to [facility] for re-treatment. Patient also needs a nurse visit to receive a Rocephin 1 gram IM x1 dose injection for recurrent UTI. Please help patient schedule. Thanks.

## 2024-12-31 ENCOUNTER — CLINICAL SUPPORT (OUTPATIENT)
Dept: UROLOGY | Facility: CLINIC | Age: 44
End: 2024-12-31
Payer: COMMERCIAL

## 2024-12-31 VITALS — DIASTOLIC BLOOD PRESSURE: 76 MMHG | HEART RATE: 112 BPM | SYSTOLIC BLOOD PRESSURE: 112 MMHG

## 2024-12-31 DIAGNOSIS — N30.01 ACUTE CYSTITIS WITH HEMATURIA: Primary | ICD-10-CM

## 2024-12-31 PROCEDURE — 96372 THER/PROPH/DIAG INJ SC/IM: CPT | Mod: S$GLB,,, | Performed by: NURSE PRACTITIONER

## 2024-12-31 PROCEDURE — 99999 PR PBB SHADOW E&M-EST. PATIENT-LVL II: CPT | Mod: PBBFAC,,,

## 2024-12-31 NOTE — PROGRESS NOTES
Patient was given 1 gm Ceftriaxone in 2.2 ml of 1% lidocaine in right upper outer quad of right gluteus muscle. Patient remained in clinic for 15 min to monitor for s/s of adverse reaction. No reaction was observed or reported. I have reached out to provider for next steps for patient.

## 2025-01-06 DIAGNOSIS — F90.9 ATTENTION DEFICIT HYPERACTIVITY DISORDER (ADHD), UNSPECIFIED ADHD TYPE: ICD-10-CM

## 2025-01-06 DIAGNOSIS — F32.A ANXIETY AND DEPRESSION: ICD-10-CM

## 2025-01-06 DIAGNOSIS — F41.9 ANXIETY AND DEPRESSION: ICD-10-CM

## 2025-01-06 RX ORDER — ALPRAZOLAM 0.25 MG/1
0.25 TABLET ORAL 3 TIMES DAILY
Qty: 30 TABLET | Refills: 0 | Status: SHIPPED | OUTPATIENT
Start: 2025-01-06 | End: 2025-02-05

## 2025-01-06 RX ORDER — LISDEXAMFETAMINE DIMESYLATE 20 MG/1
20 CAPSULE ORAL EVERY MORNING
Qty: 30 CAPSULE | Refills: 0 | Status: SHIPPED | OUTPATIENT
Start: 2025-01-06

## 2025-01-06 NOTE — TELEPHONE ENCOUNTER
No care due was identified.  Health [facility] Embedded Care Due Messages. Reference number: [id number].   1/06/2025 7:08:02 AM CST

## 2025-01-08 ENCOUNTER — PATIENT MESSAGE (OUTPATIENT)
Dept: UROLOGY | Facility: CLINIC | Age: 45
End: 2025-01-08
Payer: COMMERCIAL

## 2025-01-16 ENCOUNTER — OFFICE VISIT (OUTPATIENT)
Dept: UROLOGY | Facility: CLINIC | Age: 45
End: 2025-01-16
Payer: COMMERCIAL

## 2025-01-16 VITALS
WEIGHT: 205 LBS | SYSTOLIC BLOOD PRESSURE: 140 MMHG | DIASTOLIC BLOOD PRESSURE: 79 MMHG | HEART RATE: 101 BPM | BODY MASS INDEX: 35 KG/M2 | HEIGHT: 64 IN

## 2025-01-16 DIAGNOSIS — N30.00 ACUTE CYSTITIS WITHOUT HEMATURIA: ICD-10-CM

## 2025-01-16 DIAGNOSIS — R10.9 LEFT FLANK PAIN: ICD-10-CM

## 2025-01-16 DIAGNOSIS — N20.0 BILATERAL NEPHROLITHIASIS: Primary | ICD-10-CM

## 2025-01-16 PROCEDURE — 1160F RVW MEDS BY RX/DR IN RCRD: CPT | Mod: CPTII,S$GLB,, | Performed by: NURSE PRACTITIONER

## 2025-01-16 PROCEDURE — 87086 URINE CULTURE/COLONY COUNT: CPT | Performed by: NURSE PRACTITIONER

## 2025-01-16 PROCEDURE — 3078F DIAST BP <80 MM HG: CPT | Mod: CPTII,S$GLB,, | Performed by: NURSE PRACTITIONER

## 2025-01-16 PROCEDURE — 3077F SYST BP >= 140 MM HG: CPT | Mod: CPTII,S$GLB,, | Performed by: NURSE PRACTITIONER

## 2025-01-16 PROCEDURE — 87088 URINE BACTERIA CULTURE: CPT | Performed by: NURSE PRACTITIONER

## 2025-01-16 PROCEDURE — 99999 PR PBB SHADOW E&M-EST. PATIENT-LVL IV: CPT | Mod: PBBFAC,,, | Performed by: NURSE PRACTITIONER

## 2025-01-16 PROCEDURE — 1159F MED LIST DOCD IN RCRD: CPT | Mod: CPTII,S$GLB,, | Performed by: NURSE PRACTITIONER

## 2025-01-16 PROCEDURE — 3008F BODY MASS INDEX DOCD: CPT | Mod: CPTII,S$GLB,, | Performed by: NURSE PRACTITIONER

## 2025-01-16 PROCEDURE — 99214 OFFICE O/P EST MOD 30 MIN: CPT | Mod: S$GLB,,, | Performed by: NURSE PRACTITIONER

## 2025-01-16 PROCEDURE — 87186 SC STD MICRODIL/AGAR DIL: CPT | Performed by: NURSE PRACTITIONER

## 2025-01-16 RX ORDER — KETOROLAC TROMETHAMINE 10 MG/1
10 TABLET, FILM COATED ORAL EVERY 6 HOURS PRN
Qty: 20 TABLET | Refills: 0 | Status: CANCELLED | OUTPATIENT
Start: 2025-01-16 | End: 2025-01-21

## 2025-01-16 RX ORDER — TAMSULOSIN HYDROCHLORIDE 0.4 MG/1
0.4 CAPSULE ORAL DAILY
Qty: 30 CAPSULE | Refills: 0 | Status: SHIPPED | OUTPATIENT
Start: 2025-01-16 | End: 2025-02-15

## 2025-01-16 RX ORDER — HYDROCODONE BITARTRATE AND ACETAMINOPHEN 5; 325 MG/1; MG/1
1 TABLET ORAL EVERY 6 HOURS PRN
Qty: 12 TABLET | Refills: 0 | Status: SHIPPED | OUTPATIENT
Start: 2025-01-16 | End: 2025-01-19

## 2025-01-16 NOTE — PATIENT INSTRUCTIONS
Urine cx today  Start taking ketorolac (Toradol) 10 mg every 6 hours as needed for mild to moderate pain. Patient has a script at the house.   Start taking Norco 5 mg every 6 hours as needed for moderate to severe pain.  Start trial of tamsulosin (Flomax) 0.4 mg daily and increase water intake to promote passage of renal stones.   Follow-up in 1 week via patient portal and let me know how you are doing.

## 2025-01-16 NOTE — PROGRESS NOTES
Subjective:       Patient ID: Lesa Leyva is a 44 y.o. female.    Chief Complaint: Bladder Pain and Abdominal Pain (Been vomiting and is in serve pain )    Patient is here today for an ER follow-up for left flank pain on 1/14/2025. CT performed at that time which showed bilateral non-obstructing renal stones. The largest stone measures 5 mm in left kidney. Results discussed with patient. Right renal ESWL recommended for treatment of 5 mm left renal stone since patient is experiencing pain. She would like to hold off on procedure and see how she does over the next few days with Flomax and pain medication. Patient also was recently treated by me for a UTI on 12/27/2024 with Macrobid and Rocephin.     Flank Pain  This is a new problem. The current episode started 1 to 4 weeks ago. The problem is unchanged. The quality of the pain is described as aching and stabbing. The pain is moderate. Pertinent negatives include no abdominal pain, bladder incontinence, bowel incontinence, dysuria, fever, weakness or hematuria. Risk factors include history of renal stones and obesity. She has tried muscle relaxant for the symptoms. The treatment provided mild relief.     Review of Systems   Constitutional:  Negative for fever.   Gastrointestinal:  Positive for nausea. Negative for abdominal pain, bowel incontinence, change in bowel habit and vomiting.   Genitourinary:  Positive for flank pain (left). Negative for bladder incontinence, decreased urine volume, dysuria, hematuria, nocturia and urgency.   Neurological:  Negative for weakness.   Psychiatric/Behavioral: Negative.           Objective:      Physical Exam  Vitals and nursing note reviewed.   Constitutional:       General: She is not in acute distress.     Appearance: She is well-developed. She is obese. She is not ill-appearing.   HENT:      Head: Normocephalic and atraumatic.   Eyes:      Pupils: Pupils are equal, round, and reactive to light.   Pulmonary:      Effort:  Pulmonary effort is normal. No respiratory distress.   Abdominal:      Palpations: Abdomen is soft.      Tenderness: There is no abdominal tenderness.   Musculoskeletal:         General: Normal range of motion.      Cervical back: Normal range of motion.   Skin:     General: Skin is warm and dry.   Neurological:      Mental Status: She is alert and oriented to person, place, and time.      Coordination: Coordination normal.   Psychiatric:         Mood and Affect: Mood normal.         Behavior: Behavior normal.         Thought Content: Thought content normal.         Judgment: Judgment normal.         Assessment:       Problem List Items Addressed This Visit    None  Visit Diagnoses       Bilateral nephrolithiasis    -  Primary    Relevant Medications    HYDROcodone-acetaminophen (NORCO) 5-325 mg per tablet    tamsulosin (FLOMAX) 0.4 mg Cap    Other Relevant Orders    Urine Culture High Risk    Left flank pain        Relevant Medications    HYDROcodone-acetaminophen (NORCO) 5-325 mg per tablet    Other Relevant Orders    Urine Culture High Risk    Acute cystitis without hematuria        Relevant Orders    Urine Culture High Risk            Plan:           Lesa was seen today for bladder pain and abdominal pain.    Diagnoses and all orders for this visit:    Bilateral nephrolithiasis  -     Urine Culture High Risk  -     HYDROcodone-acetaminophen (NORCO) 5-325 mg per tablet; Take 1 tablet by mouth every 6 (six) hours as needed for Pain.  -     tamsulosin (FLOMAX) 0.4 mg Cap; Take 1 capsule (0.4 mg total) by mouth once daily.    Left flank pain  -     Urine Culture High Risk  -     HYDROcodone-acetaminophen (NORCO) 5-325 mg per tablet; Take 1 tablet by mouth every 6 (six) hours as needed for Pain.    Acute cystitis without hematuria  -     Urine Culture High Risk    Other orders  Start taking ketorolac (Toradol) 10 mg every 6 hours as needed for mild to moderate pain. Patient has a script at the house.   Start  taking Norco 5 mg every 6 hours as needed for moderate to severe pain.  Start trial of tamsulosin (Flomax) 0.4 mg daily and increase water intake to promote passage of renal stones.     Follow-up in 1 week via patient portal and let me know how you are doing.    Soren March, DNP

## 2025-01-19 ENCOUNTER — PATIENT MESSAGE (OUTPATIENT)
Dept: UROLOGY | Facility: CLINIC | Age: 45
End: 2025-01-19
Payer: COMMERCIAL

## 2025-01-19 LAB — BACTERIA UR CULT: ABNORMAL

## 2025-01-22 ENCOUNTER — TELEPHONE (OUTPATIENT)
Dept: UROLOGY | Facility: CLINIC | Age: 45
End: 2025-01-22
Payer: COMMERCIAL

## 2025-01-22 DIAGNOSIS — N39.0 RECURRENT UTI (URINARY TRACT INFECTION): ICD-10-CM

## 2025-01-22 DIAGNOSIS — N30.00 ACUTE CYSTITIS WITHOUT HEMATURIA: Primary | ICD-10-CM

## 2025-01-22 RX ORDER — CEFTRIAXONE 1 G/1
1 INJECTION, POWDER, FOR SOLUTION INTRAMUSCULAR; INTRAVENOUS
Status: COMPLETED | OUTPATIENT
Start: 2025-01-23 | End: 2025-01-24

## 2025-01-22 RX ORDER — NITROFURANTOIN 25; 75 MG/1; MG/1
100 CAPSULE ORAL 2 TIMES DAILY
Qty: 20 CAPSULE | Refills: 0 | Status: SHIPPED | OUTPATIENT
Start: 2025-01-22 | End: 2025-02-01

## 2025-01-22 NOTE — TELEPHONE ENCOUNTER
Telephone call placed to patient to discuss positive urine cx results. UTI has been recurrent. She was last treated with Macrobid on 12/30/2024. This current urine cx Macrobid is the only oral antibiotic that is sensitive to bacteria. Will retreat with Macrobid and administer a rocephin IM injection. Patient agrees with plan of care. We will discuss if she would like to move forward with lithotripsy after UTI has resolved.     Diagnoses and all orders for this visit:    Acute cystitis without hematuria  -     nitrofurantoin, macrocrystal-monohydrate, (MACROBID) 100 MG capsule; Take 1 capsule (100 mg total) by mouth 2 (two) times daily. for 10 days  -     cefTRIAXone injection 1 g  -     Urine Culture High Risk; Future    Recurrent UTI (urinary tract infection)  -     nitrofurantoin, macrocrystal-monohydrate, (MACROBID) 100 MG capsule; Take 1 capsule (100 mg total) by mouth 2 (two) times daily. for 10 days  -     cefTRIAXone injection 1 g  -     Urine Culture High Risk; Future    Other order  Nurse visit tomorrow (1/23/2025) for rocephin IM injection.  Repeat urine cx after completion of antibiotic therapy (Macrobid). Order placed.     Soren March, DNP

## 2025-01-22 NOTE — TELEPHONE ENCOUNTER
Called and left  for patient that we will have to reschedule planned nurse visit for Rocephin injection tomorrow 1/23 as the clinic will remain closed due to inclement weather. I tentatively placed patient on schedule for 9:00 on Friday 1/24 for nurse visit with Rocephin.

## 2025-01-24 ENCOUNTER — CLINICAL SUPPORT (OUTPATIENT)
Dept: UROLOGY | Facility: CLINIC | Age: 45
End: 2025-01-24
Payer: COMMERCIAL

## 2025-01-24 VITALS — HEART RATE: 112 BPM | SYSTOLIC BLOOD PRESSURE: 116 MMHG | DIASTOLIC BLOOD PRESSURE: 83 MMHG

## 2025-01-24 DIAGNOSIS — R10.9 LEFT FLANK PAIN: Primary | ICD-10-CM

## 2025-01-24 PROCEDURE — 99999 PR PBB SHADOW E&M-EST. PATIENT-LVL II: CPT | Mod: PBBFAC,,,

## 2025-01-24 PROCEDURE — 96372 THER/PROPH/DIAG INJ SC/IM: CPT | Mod: S$GLB,,, | Performed by: NURSE PRACTITIONER

## 2025-01-24 RX ADMIN — CEFTRIAXONE 1 G: 1 INJECTION, POWDER, FOR SOLUTION INTRAMUSCULAR; INTRAVENOUS at 09:01

## 2025-01-24 NOTE — TELEPHONE ENCOUNTER
Patient came in today and received 1 gm Rocephin IM. See message from [employer]. Patient still with significant left flank pain, wishes to move forward with scheduling ESWL. Also requests more pain medication (toradol and [place]) please send to [facility] if appropriate (pharmacy updated).

## 2025-01-24 NOTE — PROGRESS NOTES
Pt received Rocephin in lidocaine, waited in clinic for 15 minutes to assess for any adverse reaction. No s/s of adverse reaction observed or reported. Patient wishes to move forward with lithotripsy, message sent to provider.

## 2025-01-28 ENCOUNTER — PATIENT MESSAGE (OUTPATIENT)
Dept: UROLOGY | Facility: CLINIC | Age: 45
End: 2025-01-28
Payer: COMMERCIAL

## 2025-01-30 ENCOUNTER — TELEPHONE (OUTPATIENT)
Dept: UROLOGY | Facility: CLINIC | Age: 45
End: 2025-01-30
Payer: COMMERCIAL

## 2025-01-30 DIAGNOSIS — N30.00 ACUTE CYSTITIS WITHOUT HEMATURIA: ICD-10-CM

## 2025-01-30 DIAGNOSIS — N20.0 RENAL CALCULUS, LEFT: Primary | ICD-10-CM

## 2025-01-30 DIAGNOSIS — N20.0 BILATERAL NEPHROLITHIASIS: ICD-10-CM

## 2025-01-30 DIAGNOSIS — N39.0 RECURRENT UTI (URINARY TRACT INFECTION): ICD-10-CM

## 2025-01-30 DIAGNOSIS — R10.9 LEFT FLANK PAIN: ICD-10-CM

## 2025-01-30 RX ORDER — CIPROFLOXACIN 2 MG/ML
400 INJECTION, SOLUTION INTRAVENOUS
OUTPATIENT
Start: 2025-01-30

## 2025-01-30 RX ORDER — SODIUM CHLORIDE 9 MG/ML
INJECTION, SOLUTION INTRAVENOUS CONTINUOUS
OUTPATIENT
Start: 2025-01-30

## 2025-01-30 RX ORDER — METHOCARBAMOL 750 MG/1
750 TABLET, FILM COATED ORAL 4 TIMES DAILY PRN
Qty: 28 TABLET | Refills: 0 | Status: SHIPPED | OUTPATIENT
Start: 2025-01-30 | End: 2025-02-06

## 2025-01-30 RX ORDER — KETOROLAC TROMETHAMINE 10 MG/1
10 TABLET, FILM COATED ORAL EVERY 6 HOURS PRN
Qty: 20 TABLET | Refills: 0 | Status: SHIPPED | OUTPATIENT
Start: 2025-01-30 | End: 2025-02-04

## 2025-01-30 NOTE — PROGRESS NOTES
Diagnoses and all orders for this visit:    Renal calculus, left  -     ketorolac (TORADOL) 10 mg tablet; Take 1 tablet (10 mg total) by mouth every 6 (six) hours as needed for Pain.  -     methocarbamoL (ROBAXIN) 750 MG Tab; Take 1 tablet (750 mg total) by mouth 4 (four) times daily as needed (renal colic).  -     Case Request Operating Room: LITHOTRIPSY, ESWL  -     Vital Signs ; Standing  -     Notify physician ; Standing  -     Diet NPO; Standing  -     Place in Outpatient; Standing  -     Place CHARIS hose; Standing  -     Diet NPO    Left flank pain  -     ketorolac (TORADOL) 10 mg tablet; Take 1 tablet (10 mg total) by mouth every 6 (six) hours as needed for Pain.  -     methocarbamoL (ROBAXIN) 750 MG Tab; Take 1 tablet (750 mg total) by mouth 4 (four) times daily as needed (renal colic).  -     Case Request Operating Room: LITHOTRIPSY, ESWL  -     Vital Signs ; Standing  -     Notify physician ; Standing  -     Diet NPO; Standing  -     Place in Outpatient; Standing  -     Place CHARIS hose; Standing  -     Diet NPO    Bilateral nephrolithiasis    Acute cystitis without hematuria    Recurrent UTI (urinary tract infection)    Other orders  -     0.9% NaCl infusion  -     IP VTE LOW RISK PATIENT; Standing  -     ciprofloxacin (CIPRO)400mg/200ml D5W IVPB 400 mg    Schedule patient for left renal ESWL by Dr. Hernandez.   Surgery team will call you to select surgery date and discuss pre-op instructions.   Pre-op lab needed (urine culture).  Continue to avoid the use of aspirin and aspirin containing products for 7 days prior to surgery to reduce risk of bleeding.   Surgery packet can be picked up from our clinic.     Follow-up post-op.     Soren March, DNP

## 2025-01-30 NOTE — TELEPHONE ENCOUNTER
I called pt and gave her all preop information, blood work up to date, urine to be collected tomorrow (scheduled). Patient states I can scan the surgery packet to her [facility] account. I notified her that surgery would be reaching out to schedule her surgery.

## 2025-02-06 NOTE — ASSESSMENT & PLAN NOTE
-- repeat trial of lyrica, starting back at 25 mg TID   Patient has been sent a [employer] message with recommendations.

## 2025-02-07 DIAGNOSIS — N20.0 BILATERAL NEPHROLITHIASIS: ICD-10-CM

## 2025-02-10 RX ORDER — TAMSULOSIN HYDROCHLORIDE 0.4 MG/1
1 CAPSULE ORAL
Qty: 90 CAPSULE | Refills: 3 | Status: SHIPPED | OUTPATIENT
Start: 2025-02-10

## 2025-02-12 DIAGNOSIS — F90.9 ATTENTION DEFICIT HYPERACTIVITY DISORDER (ADHD), UNSPECIFIED ADHD TYPE: ICD-10-CM

## 2025-02-12 RX ORDER — LISDEXAMFETAMINE DIMESYLATE 20 MG/1
20 CAPSULE ORAL EVERY MORNING
Qty: 30 CAPSULE | Refills: 0 | Status: SHIPPED | OUTPATIENT
Start: 2025-02-12

## 2025-02-12 NOTE — TELEPHONE ENCOUNTER
No care due was identified.  [facility] Embedded Care Due Messages. Reference number: [id number].   2/12/2025 5:25:00 AM CST

## 2025-02-20 ENCOUNTER — TELEPHONE (OUTPATIENT)
Dept: UROLOGY | Facility: CLINIC | Age: 45
End: 2025-02-20
Payer: COMMERCIAL

## 2025-02-21 ENCOUNTER — TELEPHONE (OUTPATIENT)
Dept: UROLOGY | Facility: CLINIC | Age: 45
End: 2025-02-21
Payer: COMMERCIAL

## 2025-02-21 NOTE — TELEPHONE ENCOUNTER
Called and left  for emma indicating that Dr. March has had a schedule change and is no longer available on 3/3/25. Left my name and callback number to reschedule. Also sent [employer] message several days ago.

## 2025-02-24 DIAGNOSIS — N20.0 BILATERAL NEPHROLITHIASIS: Primary | ICD-10-CM

## 2025-03-15 DIAGNOSIS — F41.9 ANXIETY AND DEPRESSION: ICD-10-CM

## 2025-03-15 DIAGNOSIS — F90.9 ATTENTION DEFICIT HYPERACTIVITY DISORDER (ADHD), UNSPECIFIED ADHD TYPE: ICD-10-CM

## 2025-03-15 DIAGNOSIS — F32.A ANXIETY AND DEPRESSION: ICD-10-CM

## 2025-03-15 NOTE — TELEPHONE ENCOUNTER
No care due was identified.  Health [facility] Embedded Care Due Messages. Reference number: [id number].   3/15/2025 9:03:23 AM CDT

## 2025-03-17 RX ORDER — ALPRAZOLAM 0.25 MG/1
0.25 TABLET ORAL 3 TIMES DAILY
Qty: 30 TABLET | Refills: 0 | OUTPATIENT
Start: 2025-03-17 | End: 2025-04-16

## 2025-03-17 RX ORDER — LISDEXAMFETAMINE DIMESYLATE 20 MG/1
20 CAPSULE ORAL EVERY MORNING
Qty: 30 CAPSULE | Refills: 0 | OUTPATIENT
Start: 2025-03-17

## 2025-03-19 ENCOUNTER — PATIENT MESSAGE (OUTPATIENT)
Dept: PRIMARY CARE CLINIC | Facility: CLINIC | Age: 45
End: 2025-03-19
Payer: COMMERCIAL

## 2025-03-19 DIAGNOSIS — F90.9 ATTENTION DEFICIT HYPERACTIVITY DISORDER (ADHD), UNSPECIFIED ADHD TYPE: ICD-10-CM

## 2025-03-19 DIAGNOSIS — F32.4 MAJOR DEPRESSIVE DISORDER, SINGLE EPISODE, IN PARTIAL REMISSION: ICD-10-CM

## 2025-03-20 RX ORDER — DULOXETIN HYDROCHLORIDE 60 MG/1
60 CAPSULE, DELAYED RELEASE ORAL NIGHTLY
Qty: 90 CAPSULE | Refills: 3 | Status: SHIPPED | OUTPATIENT
Start: 2025-03-20

## 2025-03-20 NOTE — TELEPHONE ENCOUNTER
Pt f/u on 3/15 refill request for Vyvanse, was told that a visit was needed     Pt has scheduled an office visit on 4/14 with Dr. Foster, would like to know if refill can be processed

## 2025-03-20 NOTE — TELEPHONE ENCOUNTER
No care due was identified.  [facility] Embedded Care Due Messages. Reference number: [id number].   3/19/2025 8:06:13 PM CDT

## 2025-03-20 NOTE — TELEPHONE ENCOUNTER
No care due was identified.  [facility] Embedded Care Due Messages. Reference number: [id number].   3/20/2025 3:41:48 PM CDT

## 2025-03-21 RX ORDER — LISDEXAMFETAMINE DIMESYLATE 20 MG/1
20 CAPSULE ORAL EVERY MORNING
Qty: 30 CAPSULE | Refills: 0 | Status: SHIPPED | OUTPATIENT
Start: 2025-03-21

## 2025-03-31 DIAGNOSIS — F41.9 ANXIETY AND DEPRESSION: ICD-10-CM

## 2025-03-31 DIAGNOSIS — F32.A ANXIETY AND DEPRESSION: ICD-10-CM

## 2025-03-31 RX ORDER — ALPRAZOLAM 0.25 MG/1
0.25 TABLET ORAL 3 TIMES DAILY
Qty: 30 TABLET | Refills: 0 | OUTPATIENT
Start: 2025-03-31 | End: 2025-04-30

## 2025-03-31 NOTE — TELEPHONE ENCOUNTER
No care due was identified.  [facility] Embedded Care Due Messages. Reference number: [id number].   3/31/2025 9:36:12 AM CDT

## 2025-04-04 DIAGNOSIS — F41.9 ANXIETY AND DEPRESSION: ICD-10-CM

## 2025-04-04 DIAGNOSIS — F32.A ANXIETY AND DEPRESSION: ICD-10-CM

## 2025-04-04 RX ORDER — DULOXETIN HYDROCHLORIDE 30 MG/1
30 CAPSULE, DELAYED RELEASE ORAL
Qty: 90 CAPSULE | Refills: 3 | Status: SHIPPED | OUTPATIENT
Start: 2025-04-04

## 2025-04-04 NOTE — TELEPHONE ENCOUNTER
No care due was identified.  [facility] Embedded Care Due Messages. Reference number: [id number].   4/04/2025 12:17:36 AM CDT

## 2025-04-14 ENCOUNTER — OFFICE VISIT (OUTPATIENT)
Dept: PRIMARY CARE CLINIC | Facility: CLINIC | Age: 45
End: 2025-04-14
Payer: COMMERCIAL

## 2025-04-14 ENCOUNTER — HOSPITAL ENCOUNTER (OUTPATIENT)
Dept: RADIOLOGY | Facility: HOSPITAL | Age: 45
Discharge: HOME | End: 2025-04-14
Attending: STUDENT IN AN ORGANIZED HEALTH CARE EDUCATION/TRAINING PROGRAM
Payer: COMMERCIAL

## 2025-04-14 VITALS
BODY MASS INDEX: 34.32 KG/M2 | WEIGHT: 199.94 LBS | DIASTOLIC BLOOD PRESSURE: 72 MMHG | SYSTOLIC BLOOD PRESSURE: 128 MMHG

## 2025-04-14 DIAGNOSIS — M54.31 BILATERAL SCIATICA: ICD-10-CM

## 2025-04-14 DIAGNOSIS — M54.32 BILATERAL SCIATICA: ICD-10-CM

## 2025-04-14 DIAGNOSIS — F41.9 ANXIETY AND DEPRESSION: ICD-10-CM

## 2025-04-14 DIAGNOSIS — F32.A ANXIETY AND DEPRESSION: ICD-10-CM

## 2025-04-14 DIAGNOSIS — M54.32 BILATERAL SCIATICA: Primary | ICD-10-CM

## 2025-04-14 DIAGNOSIS — R23.2 HOT FLASHES: ICD-10-CM

## 2025-04-14 DIAGNOSIS — M54.31 BILATERAL SCIATICA: Primary | ICD-10-CM

## 2025-04-14 PROCEDURE — 1160F RVW MEDS BY RX/DR IN RCRD: CPT | Mod: CPTII,S$GLB,, | Performed by: STUDENT IN AN ORGANIZED HEALTH CARE EDUCATION/TRAINING PROGRAM

## 2025-04-14 PROCEDURE — 72110 X-RAY EXAM L-2 SPINE 4/>VWS: CPT | Mod: 26,,, | Performed by: RADIOLOGY

## 2025-04-14 PROCEDURE — 3078F DIAST BP <80 MM HG: CPT | Mod: CPTII,S$GLB,, | Performed by: STUDENT IN AN ORGANIZED HEALTH CARE EDUCATION/TRAINING PROGRAM

## 2025-04-14 PROCEDURE — 72110 X-RAY EXAM L-2 SPINE 4/>VWS: CPT | Mod: TC

## 2025-04-14 PROCEDURE — 99214 OFFICE O/P EST MOD 30 MIN: CPT | Mod: S$GLB,,, | Performed by: STUDENT IN AN ORGANIZED HEALTH CARE EDUCATION/TRAINING PROGRAM

## 2025-04-14 PROCEDURE — 99999 PR PBB SHADOW E&M-EST. PATIENT-LVL III: CPT | Mod: PBBFAC,,, | Performed by: STUDENT IN AN ORGANIZED HEALTH CARE EDUCATION/TRAINING PROGRAM

## 2025-04-14 PROCEDURE — 3008F BODY MASS INDEX DOCD: CPT | Mod: CPTII,S$GLB,, | Performed by: STUDENT IN AN ORGANIZED HEALTH CARE EDUCATION/TRAINING PROGRAM

## 2025-04-14 PROCEDURE — 3074F SYST BP LT 130 MM HG: CPT | Mod: CPTII,S$GLB,, | Performed by: STUDENT IN AN ORGANIZED HEALTH CARE EDUCATION/TRAINING PROGRAM

## 2025-04-14 PROCEDURE — 1159F MED LIST DOCD IN RCRD: CPT | Mod: CPTII,S$GLB,, | Performed by: STUDENT IN AN ORGANIZED HEALTH CARE EDUCATION/TRAINING PROGRAM

## 2025-04-14 RX ORDER — ESTRADIOL 0.5 MG/1
0.5 TABLET ORAL DAILY
Qty: 90 TABLET | Refills: 0 | Status: SHIPPED | OUTPATIENT
Start: 2025-04-14

## 2025-04-14 RX ORDER — ALPRAZOLAM 0.25 MG/1
0.25 TABLET ORAL NIGHTLY PRN
Qty: 30 TABLET | Refills: 5 | Status: SHIPPED | OUTPATIENT
Start: 2025-04-14 | End: 2025-05-14

## 2025-04-14 RX ORDER — CYCLOBENZAPRINE HCL 10 MG
10 TABLET ORAL NIGHTLY PRN
Qty: 30 TABLET | Refills: 0 | Status: SHIPPED | OUTPATIENT
Start: 2025-04-14

## 2025-04-14 NOTE — PROGRESS NOTES
Subjective:       Patient ID: Lesa Leyva is a 45 y.o. female.   Chief Complaint: Follow-up    PCP: Dr. Carmen Ugalde    History of Present Illness            Hot flashes:  Estrace daily, upcoming appt with gyn    Sciatica:  Has dealt with in the past, having current flare up  +low back pain and b/l sciatica  No recent injuries  No LE weakness    Review of Systems   Constitutional:  Negative for activity change, fatigue and unexpected weight change.   HENT:  Negative for hearing loss, rhinorrhea and trouble swallowing.    Eyes:  Negative for discharge and visual disturbance.   Respiratory:  Negative for chest tightness and wheezing.    Cardiovascular:  Negative for chest pain and palpitations.   Gastrointestinal:  Negative for blood in stool, constipation, diarrhea and vomiting.   Endocrine: Negative for polydipsia and polyuria.   Genitourinary:  Negative for difficulty urinating, dysuria, hematuria and menstrual problem.   Musculoskeletal:  Positive for arthralgias. Negative for joint swelling and neck pain.   Neurological:  Negative for weakness and headaches.   Psychiatric/Behavioral:  Positive for dysphoric mood. Negative for confusion.               Objective:        Physical Exam  HENT:      Head: Normocephalic and atraumatic.      Nose: Nose normal.      Mouth/Throat:      Mouth: Mucous membranes are moist.      Pharynx: Oropharynx is clear.   Eyes:      Extraocular Movements: Extraocular movements intact.      Conjunctiva/sclera: Conjunctivae normal.      Pupils: Pupils are equal, round, and reactive to light.   Pulmonary:      Effort: Pulmonary effort is normal.   Musculoskeletal:         General: No swelling. Normal range of motion.      Cervical back: Normal range of motion.      Right lower leg: No edema.      Left lower leg: No edema.   Skin:     General: Skin is warm.      Findings: No lesion or rash.   Neurological:      General: No focal deficit present.      Mental Status: She is alert and  oriented to person, place, and time.      Motor: No weakness.   Psychiatric:         Mood and Affect: Mood normal.         Thought Content: Thought content normal.         Assessment:         Problem List Items Addressed This Visit    None  Visit Diagnoses         Bilateral sciatica    -  Primary    Relevant Medications    cyclobenzaprine (FLEXERIL) 10 MG tablet    Other Relevant Orders    X-Ray Lumbar Spine 5 View              Plan:         1. Bilateral sciatica  -     X-Ray Lumbar Spine 5 View; Future; Expected date: 04/14/2025  -     cyclobenzaprine (FLEXERIL) 10 MG tablet; Take 1 tablet (10 mg total) by mouth nightly as needed for Muscle spasms.  Dispense: 30 tablet; Refill: 0  Short course of muscle relaxers as needed for back spasm.  Continue NSAIDs as needed. Ok to do ibuprofen 600 mg every six hours as needed for pain.   Apply heating pad to lumbar back for 15 min four times daily.  Return to clinic in 2 weeks if symptoms unimproved or worsening.     2. Hot flashes  -     estradioL (ESTRACE) 0.5 MG tablet; Take 1 tablet (0.5 mg total) by mouth once daily.  Dispense: 90 tablet; Refill: 0  Stable on medications, continue regimen          Shanna Foster MD

## 2025-04-14 NOTE — TELEPHONE ENCOUNTER
No care due was identified.  [facility] Embedded Care Due Messages. Reference number: [id number].   4/14/2025 10:43:30 AM CDT

## 2025-04-16 ENCOUNTER — PATIENT MESSAGE (OUTPATIENT)
Dept: PRIMARY CARE CLINIC | Facility: CLINIC | Age: 45
End: 2025-04-16
Payer: COMMERCIAL

## 2025-04-16 DIAGNOSIS — F90.9 ATTENTION DEFICIT HYPERACTIVITY DISORDER (ADHD), UNSPECIFIED ADHD TYPE: ICD-10-CM

## 2025-04-16 RX ORDER — LISDEXAMFETAMINE DIMESYLATE 20 MG/1
20 CAPSULE ORAL EVERY MORNING
Qty: 30 CAPSULE | Refills: 0 | Status: SHIPPED | OUTPATIENT
Start: 2025-04-16

## 2025-04-16 NOTE — TELEPHONE ENCOUNTER
No care due was identified.  [facility] Embedded Care Due Messages. Reference number: [id number].   4/16/2025 9:10:56 AM CDT

## 2025-04-21 PROBLEM — E66.01 SEVERE OBESITY (BMI 35.0-39.9) WITH COMORBIDITY: Status: RESOLVED | Noted: 2023-06-08 | Resolved: 2025-04-21

## 2025-04-22 ENCOUNTER — PATIENT MESSAGE (OUTPATIENT)
Dept: PRIMARY CARE CLINIC | Facility: CLINIC | Age: 45
End: 2025-04-22
Payer: COMMERCIAL

## 2025-04-23 NOTE — TELEPHONE ENCOUNTER
LOV with Dr. Foster on 4/14/2025    Pt prescribed Flexeril, told to continue NSAID, ibuprofen, apply heating pad, and advised to RTC in 2 weeks if symptoms unimproved or worsening

## 2025-04-29 ENCOUNTER — PATIENT MESSAGE (OUTPATIENT)
Dept: PRIMARY CARE CLINIC | Facility: CLINIC | Age: 45
End: 2025-04-29
Payer: COMMERCIAL

## 2025-04-29 NOTE — TELEPHONE ENCOUNTER
LOV with Carmen Ugalde MD , 4/14/2025  NVV with Carmen Ugalde MD, 5/19/25    Patient requesting earlier appointment. Unable to make an appointment without override. Can you please assist?

## 2025-05-02 ENCOUNTER — OFFICE VISIT (OUTPATIENT)
Dept: PRIMARY CARE CLINIC | Facility: CLINIC | Age: 45
End: 2025-05-02
Payer: COMMERCIAL

## 2025-05-02 DIAGNOSIS — F32.A ANXIETY AND DEPRESSION: ICD-10-CM

## 2025-05-02 DIAGNOSIS — E66.01 SEVERE OBESITY (BMI 35.0-39.9) WITH COMORBIDITY: ICD-10-CM

## 2025-05-02 DIAGNOSIS — G43.709 CHRONIC MIGRAINE WITHOUT AURA WITHOUT STATUS MIGRAINOSUS, NOT INTRACTABLE: ICD-10-CM

## 2025-05-02 DIAGNOSIS — Z00.00 ANNUAL PHYSICAL EXAM: ICD-10-CM

## 2025-05-02 DIAGNOSIS — M54.41 CHRONIC BILATERAL LOW BACK PAIN WITH BILATERAL SCIATICA: Primary | ICD-10-CM

## 2025-05-02 DIAGNOSIS — N20.0 NEPHROLITHIASIS: ICD-10-CM

## 2025-05-02 DIAGNOSIS — F41.9 ANXIETY AND DEPRESSION: ICD-10-CM

## 2025-05-02 DIAGNOSIS — G62.9 NEUROPATHY: ICD-10-CM

## 2025-05-02 DIAGNOSIS — Z12.31 ENCOUNTER FOR SCREENING MAMMOGRAM FOR MALIGNANT NEOPLASM OF BREAST: ICD-10-CM

## 2025-05-02 DIAGNOSIS — G89.29 CHRONIC BILATERAL LOW BACK PAIN WITH BILATERAL SCIATICA: Primary | ICD-10-CM

## 2025-05-02 DIAGNOSIS — M54.42 CHRONIC BILATERAL LOW BACK PAIN WITH BILATERAL SCIATICA: Primary | ICD-10-CM

## 2025-05-02 RX ORDER — PREGABALIN 100 MG/1
200 CAPSULE ORAL DAILY
Qty: 180 CAPSULE | Refills: 3 | Status: SHIPPED | OUTPATIENT
Start: 2025-05-02

## 2025-05-02 RX ORDER — CELECOXIB 200 MG/1
200 CAPSULE ORAL 2 TIMES DAILY
Qty: 60 CAPSULE | Refills: 1 | Status: SHIPPED | OUTPATIENT
Start: 2025-05-02

## 2025-05-02 NOTE — PROGRESS NOTES
The patient location is: home  The chief complaint leading to consultation is: anxiety    Visit type: audiovisual    Face to Face time with patient: 10 minutes  10 minutes of total time spent on the encounter, which includes face to face time and non-face to face time preparing to see the patient (eg, review of tests), Obtaining and/or reviewing separately obtained history, Documenting clinical information in the electronic or other health record, Independently interpreting results (not separately reported) and communicating results to the patient/family/caregiver, or Care coordination (not separately reported).     Each patient to whom he or she provides medical services by telemedicine is:  (1) informed of the relationship between the physician and patient and the respective role of any other health care provider with respect to management of the patient; and (2) notified that he or she may decline to receive medical services by telemedicine and may withdraw from such care at any time.    [facility] Note    Chief Complaint      Chief Complaint   Patient presents with    Back Pain    Follow-up     History of Present Illness      Lesa Leyva is a 45 y.o. female who presents today for anxiety.  Patient comes to appointment alone.    Seen by Dr. Foster 4/14/2025 for lower back pain after 1 month of symptoms. LBP radiates down in to legs. Had issues with lifting legs for 3 days.  Had another episode like this last week.  Has neuropathy but has been having more numbness/tingling. Hands, fingers, knees feet are all painful. Had fever last week.  No diarrhea/constipation/dysuria/hematuria.    Takes flexeril and tylenol which do not help.  Declines PT at this time. Taking lyrica once daily.    Family hx of autoimmune disease, aunt has SLE./    Problem List Items Addressed This Visit       Anxiety and depression    Current Assessment & Plan   On cymbalta 60 mg daily.  No SI/HI. Takes xanax once per  day related to work.         Chronic migraine without aura without status migrainosus, not intractable    Overview   Onset in teens, worsening over 09/2023     Current Regimen:   Ajovy 1.5mL [id number]  Ubrelvy 100mg PRN     Tried and Failed:   Amitriptyline  Lyrica  Trazodone  Cymbalta  Naproxen   Ibuprofen   Tylenol  Flexeril   Diclofenac  Toradol   Sumatriptan    Patient has tried and failed 2 or more first line medication management options for prevention of migraine; therefore it is medically necessary that they take CGRP antagonist for prevention of migraine.     Patient has tried and failed multiple triptans for acute relief of migraine and therefore it is medically necessary that they be on CGRP antagonist for acute relief.       Due to patient concurrent use of SSRI cannot tolerate additional SSRI/SNRI for prevention of headache due to risk of serotonin syndrome     Due to known history of kidney stones patient is not a candidate for topamax due to black box warning for medication.          Current Assessment & Plan   Stable on ubrelvy PRN, this helps.         Nephrolithiasis    Current Assessment & Plan   Lithotripsy in 1/2025 for stone, has been doing well recently.         Neuropathy    Relevant Medications    pregabalin (LYRICA) 100 MG capsule     Other Visit Diagnoses         Chronic bilateral low back pain with bilateral sciatica    -  Primary    Relevant Medications    celecoxib (CELEBREX) 200 MG capsule    Other Relevant Orders    Ambulatory referral/consult to [facility] Care    RAGHAV Screen w/Reflex    Rheumatoid Factor    Sedimentation rate    C-Reactive Protein      Annual physical exam        Relevant Orders    CBC Auto Differential    Lipid Panel    Comprehensive Metabolic Panel      Severe obesity (BMI 35.0-39.9) with comorbidity        Relevant Orders    Hemoglobin A1C      Encounter for screening mammogram for malignant neoplasm of breast        Relevant Orders    Mammo Digital Screening Bilat w/  Marshal (XPD)              Health Maintenance   Topic Date Due    COVID-19 Vaccine (3 - 2024-25 season) 09/01/2024    Mammogram  05/10/2025    Influenza Vaccine (Season Ended) 09/01/2025    Hemoglobin A1c (Diabetic Prevention Screening)  03/24/2026    TETANUS VACCINE  01/25/2029    RSV Vaccine (Age 60+ and Pregnant patients) (1 - 1-dose 75+ series) 02/09/2057    Hepatitis C Screening  Completed    HIV Screening  Completed    Lipid Panel  Completed    Pneumococcal Vaccines (Age 0-49)  Aged Out       Past Medical History:   Diagnosis Date    ADHD     Anxiety     Chronic left-sided low back pain without sciatica     Cystitis     Depression     H/O: hysterectomy 05/29/2019    Hydronephrosis     Insomnia     Kidney stones     Nephrolithiasis 11/20/2018    Neuropathy of both feet     Neuropathy of hand     both hands    Painful bladder spasm     Primary osteoarthritis of left knee     tka    Pyelonephritis     Ureteral stricture, left     Urinary tract infection        Past Surgical History:   Procedure Laterality Date    BLADDER FULGURATION N/A 05/31/2018    Procedure: FULGURATION-BLADDER;  Surgeon: Angel Hernandez MD;  Location: [facility] OR;  Service: Urology;  Laterality: N/A;    BLADDER SUSPENSION  2013    bladder lift    CYSTOSCOPY W/ URETERAL STENT PLACEMENT Left 03/2015    CYSTOSCOPY W/ URETERAL STENT REMOVAL Left 05/31/2018    Procedure: CYSTOSCOPY WITH STENT REMOVAL;  Surgeon: Angel Hernandez MD;  Location: [facility] OR;  Service: Urology;  Laterality: Left;    EXTRACORPOREAL SHOCK WAVE LITHOTRIPSY Right 09/06/2018    Procedure: LITHOTRIPSY, ESWL - Right distal ureteral calculus;  Surgeon: Angel Hernandez MD;  Location: [facility] OR;  Service: Urology;  Laterality: Right;    EXTRACORPOREAL SHOCK WAVE LITHOTRIPSY Right 01/16/2020    Procedure: LITHOTRIPSY, ESWL;  Surgeon: Angel Hernandez MD;  Location: [facility] OR;  Service: Urology;  Laterality: Right;    EXTRACORPOREAL SHOCK WAVE LITHOTRIPSY Left 12/08/2022    Procedure:  LITHOTRIPSY, ESWL;  Surgeon: Angel Hernandez MD;  Location: [facility] OR;  Service: Urology;  Laterality: Left;  ESWL on left renal calculs    EXTRACORPOREAL SHOCK WAVE LITHOTRIPSY Left 02/06/2025    Procedure: LITHOTRIPSY, ESWL;  Surgeon: Angel Hernandez MD;  Location: [facility] OR;  Service: Urology;  Laterality: Left;    HYSTERECTOMY      Partial - still has ovaries    KNEE SURGERY Left     multiple arthroscopies with 1 meniscus repair    lithotripsy  04/2012    OVARIAN CYST SURGERY Right 2006    RETROGRADE PYELOGRAPHY Left 05/31/2018    Procedure: PYELOGRAM, RETROGRADE;  Surgeon: Angel Hernandez MD;  Location: [facility] OR;  Service: Urology;  Laterality: Left;    TOTAL KNEE ARTHROPLASTY Left 05/24/2021    Procedure: ARTHROPLASTY, KNEE, TOTAL monoblock;  Surgeon: Ralf Wilkinson MD;  Location: [facility] OR;  Service: Orthopedics;  Laterality: Left;  Odilon tavera/ Braydon confirmed 5/21/21 [place]       family history includes Arthritis in her mother; Breast cancer in her maternal grandmother; COPD in her maternal grandfather; Cancer in her mother; Depression in her mother; Fibromyalgia in her mother; Hypertension in her father and mother; No Known Problems in her sister, sister, and sister; Seizures in her brother, brother, and mother.    Social History     Tobacco Use    Smoking status: Never    Smokeless tobacco: Never   Substance Use Topics    Alcohol use: Yes     Comment: once every couple of months    Drug use: Never       Review of Systems   Constitutional:  Positive for weight loss. Negative for chills and fever.   Respiratory:  Negative for cough and shortness of breath.    Cardiovascular:  Negative for chest pain and palpitations.   Gastrointestinal:  Negative for abdominal pain, constipation, diarrhea, nausea and vomiting.   Genitourinary:  Negative for dysuria and hematuria.   Musculoskeletal:  Positive for back pain. Negative for falls.   Neurological:  Positive for tingling and weakness. Negative for headaches.        Outpatient  Encounter Medications as of 5/2/2025   Medication Sig Dispense Refill    ALPRAZolam (XANAX) 0.25 MG tablet Take 1 tablet (0.25 mg total) by mouth nightly as needed for Anxiety. 30 tablet 5    celecoxib (CELEBREX) 200 MG capsule Take 1 capsule (200 mg total) by mouth 2 (two) times daily. 60 capsule 1    cyclobenzaprine (FLEXERIL) 10 MG tablet Take 1 tablet (10 mg total) by mouth nightly as needed for Muscle spasms. 30 tablet 0    DULoxetine (CYMBALTA) 30 MG capsule TAKE 1 CAPSULE BY MOUTH EVERY DAY 90 capsule 3    DULoxetine (CYMBALTA) 60 MG capsule Take 1 capsule (60 mg total) by mouth every evening. 90 capsule 3    estradioL (ESTRACE) 0.5 MG tablet Take 1 tablet (0.5 mg total) by mouth once daily. 90 tablet 0    LIDOcaine (LIDODERM) 5 % Place 1 patch onto the skin once daily. Remove & Discard patch within 12 hours or as directed by MD (Patient taking differently: Place 1 patch onto the skin once daily. Not using) 5 patch 0    lisdexamfetamine (VYVANSE) 20 MG capsule Take 1 capsule (20 mg total) by mouth every morning. 30 capsule 0    ondansetron (ZOFRAN-ODT) 4 MG TbDL Take 1 tablet (4 mg total) by mouth every 6 (six) hours as needed (nausea). 10 tablet 0    pregabalin (LYRICA) 100 MG capsule Take 2 capsules (200 mg total) by mouth once daily. 180 capsule 3    tamsulosin (FLOMAX) 0.4 mg Cap TAKE 1 CAPSULE BY MOUTH EVERY DAY 90 capsule 3    traZODone (DESYREL) 100 MG tablet TAKE 1 TABLET BY MOUTH EVERY DAY IN THE EVENING 90 tablet 3    ubrogepant (UBRELVY) 100 mg tablet Take 1 tablet (100 mg total) by mouth as needed for Migraine. If symptoms persist or return, may repeat dose after 2 hours. Maximum: 200 mg per 24 hours 10 tablet 11    [DISCONTINUED] pregabalin (LYRICA) 100 MG capsule Take 1 capsule (100 mg total) by mouth once daily. (Patient taking differently: Take 100 mg by mouth every evening.) 90 capsule 3     No facility-administered encounter medications on file as of 5/2/2025.        Review of patient's  "allergies indicates:   Allergen Reactions    Peaches [peach (prunus persica)] Hives, Itching and Swelling    Tramadol Other (See Comments)     headache       Physical Exam       ]    Physical Exam  Constitutional:       General: She is not in acute distress.     Appearance: She is well-developed.   HENT:      Head: Normocephalic and atraumatic.   Pulmonary:      Effort: Pulmonary effort is normal.   Musculoskeletal:      Cervical back: Normal range of motion.   Skin:     Findings: No rash.   Neurological:      Mental Status: She is alert and oriented to person, place, and time.      Coordination: Coordination normal.   Psychiatric:         Behavior: Behavior normal.         Thought Content: Thought content normal.         Judgment: Judgment normal.          Laboratory:  CBC:  No results for input(s): "WBC", "RBC", "HGB", "HCT", "PLT", "MCV", "MCH", "MCHC" in the last 2160 hours.    CMP:  No results for input(s): "GLU", "CALCIUM", "ALBUMIN", "PROT", "NA", "K", "CO2", "CL", "BUN", "ALKPHOS", "ALT", "AST", "BILITOT" in the last 2160 hours.    Invalid input(s): "CREATININ"    URINALYSIS:  No results for input(s): "COLORU", "CLARITYU", "SPECGRAV", "PHUR", "PROTEINUA", "GLUCOSEU", "BILIRUBINCON", "BLOODU", "WBCU", "RBCU", "BACTERIA", "MUCUS", "NITRITE", "LEUKOCYTESUR", "UROBILINOGEN", "HYALINECASTS" in the last 2160 hours.   LIPIDS:  No results for input(s): "TSH", "HDL", "CHOL", "TRIG", "LDLCALC", "CHOLHDL", "NONHDLCHOL", "TOTALCHOLEST" in the last 2160 hours.    TSH:  No results for input(s): "TSH" in the last 2160 hours.    A1C:  No results for input(s): "HGBA1C" in the last 2160 hours.      Radiology:  Mammo Digital Screening Bilat w/ Marshal    Result Date: 5/10/2023  Result:  Mammo Digital Screening Bilat w/ Marshal    History:  Patient is 43 y.o. and is seen for a screening mammogram.      Films Compared:  Compared to: 10/28/2022 US Breast Left Limited, 05/02/2022 US Breast Left   Limited, 05/02/2022 Mammo Digital " Diagnostic Left with Marshal, and   04/19/2022 Mammo Digital Screening Bilat w/ Marshal     Findings:   This procedure was performed using tomosynthesis.   Computer-aided detection was utilized in the interpretation of this   examination.    The breasts have scattered areas of fibroglandular density. There is no   evidence of suspicious masses, microcalcifications or architectural   distortion.           Assessment/Plan     Lesa Leyva is a 45 y.o.female with:    1. Neuropathy  - pregabalin (LYRICA) 100 MG capsule; Take 2 capsules (200 mg total) by mouth once daily.  Dispense: 180 capsule; Refill: 3    2. Chronic bilateral low back pain with bilateral sciatica  - Ambulatory referral/consult to Spine Care; Future  - celecoxib (CELEBREX) 200 MG capsule; Take 1 capsule (200 mg total) by mouth 2 (two) times daily.  Dispense: 60 capsule; Refill: 1  - RAGHAV Screen w/Reflex; Future  - Rheumatoid Factor; Future  - Sedimentation rate; Future  - C-Reactive Protein; Future    3. Annual physical exam  - CBC Auto Differential; Future  - Lipid Panel; Future  - Comprehensive Metabolic Panel; Future    4. Severe obesity (BMI 35.0-39.9) with comorbidity  - Hemoglobin A1C; Future    5. Nephrolithiasis    6. Anxiety and depression    7. Encounter for screening mammogram for malignant neoplasm of breast  - Mammo Digital Screening Bilat w/ Marshal (XPD); Future    8. Chronic migraine without aura without status migrainosus, not intractable      -declines PT  -Continue current medications and maintain follow up with specialists.    -No follow-ups on file.       Carmen Ugalde MD  [facility]      Answers submitted by the patient for this visit:  Back Pain Questionnaire (Submitted on 5/2/2025)  Chief Complaint: Back pain  Chronicity: chronic  Onset: more than 1 month ago  Frequency: every several days  Progression since onset: rapidly worsening  Pain location: gluteal, sacro-iliac  Pain quality: stabbing  Radiates to: left foot, left  knee, left thigh, right foot, right knee, right thigh  Pain - numeric: 7/10  Pain is: the same all the time  Aggravated by: position, lying down, sitting, standing, stress, twisting  Stiffness is present: all day  bladder incontinence: No  bowel incontinence: No  leg pain: Yes  numbness: Yes  paresis: Yes  paresthesias: Yes  pelvic pain: Yes  perianal numbness: No  genital pain: No  Risk factors: menopause, history of renal stones  Pain severity: moderate  Improvement on treatment: mild

## 2025-05-06 ENCOUNTER — LAB VISIT (OUTPATIENT)
Dept: LAB | Facility: HOSPITAL | Age: 45
End: 2025-05-06
Attending: INTERNAL MEDICINE
Payer: COMMERCIAL

## 2025-05-06 ENCOUNTER — TELEPHONE (OUTPATIENT)
Dept: ADMINISTRATIVE | Facility: OTHER | Age: 45
End: 2025-05-06
Payer: COMMERCIAL

## 2025-05-06 DIAGNOSIS — M54.41 CHRONIC BILATERAL LOW BACK PAIN WITH BILATERAL SCIATICA: ICD-10-CM

## 2025-05-06 DIAGNOSIS — E66.01 SEVERE OBESITY (BMI 35.0-39.9) WITH COMORBIDITY: ICD-10-CM

## 2025-05-06 DIAGNOSIS — G89.29 CHRONIC BILATERAL LOW BACK PAIN WITH BILATERAL SCIATICA: ICD-10-CM

## 2025-05-06 DIAGNOSIS — Z00.00 ANNUAL PHYSICAL EXAM: ICD-10-CM

## 2025-05-06 DIAGNOSIS — M54.42 CHRONIC BILATERAL LOW BACK PAIN WITH BILATERAL SCIATICA: ICD-10-CM

## 2025-05-06 LAB
ABSOLUTE EOSINOPHIL (OHS): 0.2 K/UL
ABSOLUTE MONOCYTE (OHS): 0.54 K/UL (ref 0.3–1)
ABSOLUTE NEUTROPHIL COUNT (OHS): 2.72 K/UL (ref 1.8–7.7)
ALBUMIN SERPL BCP-MCNC: 4.5 G/DL (ref 3.5–5.2)
ALP SERPL-CCNC: 92 UNIT/L (ref 38–126)
ALT SERPL W/O P-5'-P-CCNC: 26 UNIT/L (ref 10–44)
ANION GAP (OHS): 9 MMOL/L (ref 8–16)
AST SERPL-CCNC: 29 UNIT/L (ref 15–46)
BASOPHILS # BLD AUTO: 0.05 K/UL
BASOPHILS NFR BLD AUTO: 1.1 %
BILIRUB SERPL-MCNC: 0.3 MG/DL (ref 0.1–1)
BUN SERPL-MCNC: 18 MG/DL (ref 7–17)
CALCIUM SERPL-MCNC: 9.5 MG/DL (ref 8.7–10.5)
CHLORIDE SERPL-SCNC: 102 MMOL/L (ref 95–110)
CHOLEST SERPL-MCNC: 233 MG/DL (ref 120–199)
CHOLEST/HDLC SERPL: 3.8 {RATIO} (ref 2–5)
CO2 SERPL-SCNC: 29 MMOL/L (ref 23–29)
CREAT SERPL-MCNC: 0.7 MG/DL (ref 0.5–1.4)
CRP SERPL-MCNC: 0.37 MG/DL
EAG (OHS): 111 MG/DL (ref 68–131)
ERYTHROCYTE [DISTWIDTH] IN BLOOD BY AUTOMATED COUNT: 12.5 % (ref 11.5–14.5)
ERYTHROCYTE [SEDIMENTATION RATE] IN BLOOD BY PHOTOMETRIC METHOD: 11 MM/HR
GFR SERPLBLD CREATININE-BSD FMLA CKD-EPI: >60 ML/MIN/1.73/M2
GLUCOSE SERPL-MCNC: 99 MG/DL (ref 70–110)
HBA1C MFR BLD: 5.5 % (ref 4–5.6)
HCT VFR BLD AUTO: 42.7 % (ref 37–48.5)
HDLC SERPL-MCNC: 61 MG/DL (ref 40–75)
HDLC SERPL: 26.2 % (ref 20–50)
HGB BLD-MCNC: 14.2 GM/DL (ref 12–16)
IMM GRANULOCYTES # BLD AUTO: 0.08 K/UL (ref 0–0.04)
IMM GRANULOCYTES NFR BLD AUTO: 1.8 % (ref 0–0.5)
LDLC SERPL CALC-MCNC: 160.4 MG/DL (ref 63–159)
LYMPHOCYTES # BLD AUTO: 0.94 K/UL (ref 1–4.8)
MCH RBC QN AUTO: 29.9 PG (ref 27–31)
MCHC RBC AUTO-ENTMCNC: 33.3 G/DL (ref 32–36)
MCV RBC AUTO: 90 FL (ref 82–98)
NONHDLC SERPL-MCNC: 172 MG/DL
NUCLEATED RBC (/100WBC) (OHS): 0 /100 WBC
PLATELET # BLD AUTO: 279 K/UL (ref 150–450)
PMV BLD AUTO: 11.5 FL (ref 9.2–12.9)
POTASSIUM SERPL-SCNC: 4.3 MMOL/L (ref 3.5–5.1)
PROT SERPL-MCNC: 7.6 GM/DL (ref 6–8.4)
RBC # BLD AUTO: 4.75 M/UL (ref 4–5.4)
RELATIVE EOSINOPHIL (OHS): 4.4 %
RELATIVE LYMPHOCYTE (OHS): 20.8 % (ref 18–48)
RELATIVE MONOCYTE (OHS): 11.9 % (ref 4–15)
RELATIVE NEUTROPHIL (OHS): 60 % (ref 38–73)
RHEUMATOID FACT SERPL-ACNC: <13 IU/ML
SODIUM SERPL-SCNC: 140 MMOL/L (ref 136–145)
TRIGL SERPL-MCNC: 58 MG/DL (ref 30–150)
WBC # BLD AUTO: 4.53 K/UL (ref 3.9–12.7)

## 2025-05-06 PROCEDURE — 84520 ASSAY OF UREA NITROGEN: CPT | Mod: PN

## 2025-05-06 PROCEDURE — 86039 ANTINUCLEAR ANTIBODIES (ANA): CPT | Mod: PN

## 2025-05-06 PROCEDURE — 86431 RHEUMATOID FACTOR QUANT: CPT | Mod: PN

## 2025-05-06 PROCEDURE — 36415 COLL VENOUS BLD VENIPUNCTURE: CPT | Mod: PN

## 2025-05-06 PROCEDURE — 86140 C-REACTIVE PROTEIN: CPT | Mod: PN

## 2025-05-06 PROCEDURE — 86225 DNA ANTIBODY NATIVE: CPT | Mod: PN

## 2025-05-06 PROCEDURE — 86235 NUCLEAR ANTIGEN ANTIBODY: CPT | Mod: 59,PN

## 2025-05-06 PROCEDURE — 80061 LIPID PANEL: CPT | Mod: PN

## 2025-05-06 PROCEDURE — 85652 RBC SED RATE AUTOMATED: CPT | Mod: PN

## 2025-05-06 PROCEDURE — 83036 HEMOGLOBIN GLYCOSYLATED A1C: CPT | Mod: PN

## 2025-05-06 PROCEDURE — 86235 NUCLEAR ANTIGEN ANTIBODY: CPT | Mod: PN

## 2025-05-06 PROCEDURE — 85025 COMPLETE CBC W/AUTO DIFF WBC: CPT | Mod: PN

## 2025-05-07 LAB
ANA (OHS): POSITIVE
ANA PATTERN 1 (OHS): ABNORMAL
ANA TITER 1 (OHS): ABNORMAL

## 2025-05-08 LAB
DSDNA ANTIBODY (OHS): NORMAL
DSDNA ANTIBODY TITER (OHS): NORMAL
SM  ANTIBODY (OHS): 0.08 RATIO
SM INTERPRETATION (OHS): NEGATIVE
SM/RNP ANTIBODY (OHS): 0.05 RATIO
SM/RNP INTERPRETATION (OHS): NEGATIVE
SSA  ANTIBODY (OHS): 0.06 RATIO (ref 0–0.99)
SSA INTERPRETATION (OHS): NEGATIVE
SSB  ANTIBODY (OHS): 0.05 RATIO
SSB INTERPRETATION (OHS): NEGATIVE

## 2025-05-09 ENCOUNTER — RESULTS FOLLOW-UP (OUTPATIENT)
Dept: PRIMARY CARE CLINIC | Facility: CLINIC | Age: 45
End: 2025-05-09

## 2025-05-09 DIAGNOSIS — R76.8 ANA POSITIVE: Primary | ICD-10-CM

## 2025-05-11 DIAGNOSIS — M54.31 BILATERAL SCIATICA: ICD-10-CM

## 2025-05-11 DIAGNOSIS — M54.32 BILATERAL SCIATICA: ICD-10-CM

## 2025-05-11 NOTE — TELEPHONE ENCOUNTER
No care due was identified.  [facility] Embedded Care Due Messages. Reference number: [id number].   5/11/2025 12:18:08 AM CDT

## 2025-05-12 RX ORDER — CYCLOBENZAPRINE HCL 10 MG
TABLET ORAL
Qty: 90 TABLET | Refills: 1 | Status: SHIPPED | OUTPATIENT
Start: 2025-05-12

## 2025-05-17 DIAGNOSIS — F90.9 ATTENTION DEFICIT HYPERACTIVITY DISORDER (ADHD), UNSPECIFIED ADHD TYPE: ICD-10-CM

## 2025-05-17 NOTE — TELEPHONE ENCOUNTER
No care due was identified.  [facility] Embedded Care Due Messages. Reference number: [id number].   5/17/2025 3:52:27 PM CDT

## 2025-05-19 RX ORDER — LISDEXAMFETAMINE DIMESYLATE 20 MG/1
20 CAPSULE ORAL EVERY MORNING
Qty: 30 CAPSULE | Refills: 0 | Status: SHIPPED | OUTPATIENT
Start: 2025-05-19

## 2025-05-19 NOTE — TELEPHONE ENCOUNTER
----- Message from Ronna Holcomb sent at 7/31/2017  9:18 AM CDT -----  Contact: [phone]/self  Pt requesting to speak with the nurse concerning a prescription for an antibiotics and pain medication.  Please advise   
Needs to talk with dr. Murdock about this since he will be doing surgery this week.  
She is aware of the message  
soft

## 2025-06-22 DIAGNOSIS — F90.9 ATTENTION DEFICIT HYPERACTIVITY DISORDER (ADHD), UNSPECIFIED ADHD TYPE: ICD-10-CM

## 2025-06-23 RX ORDER — LISDEXAMFETAMINE DIMESYLATE 20 MG/1
20 CAPSULE ORAL EVERY MORNING
Qty: 30 CAPSULE | Refills: 0 | Status: SHIPPED | OUTPATIENT
Start: 2025-06-23

## 2025-06-23 NOTE — TELEPHONE ENCOUNTER
No care due was identified.  Health [facility] Embedded Care Due Messages. Reference number: [id number].   6/22/2025 9:06:39 PM CDT

## 2025-07-07 ENCOUNTER — LAB VISIT (OUTPATIENT)
Dept: LAB | Facility: HOSPITAL | Age: 45
End: 2025-07-07
Attending: ANESTHESIOLOGY
Payer: COMMERCIAL

## 2025-07-07 DIAGNOSIS — D64.9 ANEMIA, UNSPECIFIED TYPE: Primary | ICD-10-CM

## 2025-07-07 LAB
ABSOLUTE EOSINOPHIL (OHS): 0.04 K/UL
ABSOLUTE MONOCYTE (OHS): 0.6 K/UL (ref 0.3–1)
ABSOLUTE NEUTROPHIL COUNT (OHS): 3.32 K/UL (ref 1.8–7.7)
BASOPHILS # BLD AUTO: 0.04 K/UL
BASOPHILS NFR BLD AUTO: 0.7 %
ERYTHROCYTE [DISTWIDTH] IN BLOOD BY AUTOMATED COUNT: 12.4 % (ref 11.5–14.5)
HCT VFR BLD AUTO: 42.8 % (ref 37–48.5)
HGB BLD-MCNC: 14.4 GM/DL (ref 12–16)
IMM GRANULOCYTES # BLD AUTO: 0.06 K/UL (ref 0–0.04)
IMM GRANULOCYTES NFR BLD AUTO: 1 % (ref 0–0.5)
LYMPHOCYTES # BLD AUTO: 2.04 K/UL (ref 1–4.8)
MCH RBC QN AUTO: 29.9 PG (ref 27–31)
MCHC RBC AUTO-ENTMCNC: 33.6 G/DL (ref 32–36)
MCV RBC AUTO: 89 FL (ref 82–98)
NUCLEATED RBC (/100WBC) (OHS): 0 /100 WBC
PLATELET # BLD AUTO: 254 K/UL (ref 150–450)
PMV BLD AUTO: 11.1 FL (ref 9.2–12.9)
RBC # BLD AUTO: 4.81 M/UL (ref 4–5.4)
RELATIVE EOSINOPHIL (OHS): 0.7 %
RELATIVE LYMPHOCYTE (OHS): 33.4 % (ref 18–48)
RELATIVE MONOCYTE (OHS): 9.8 % (ref 4–15)
RELATIVE NEUTROPHIL (OHS): 54.4 % (ref 38–73)
WBC # BLD AUTO: 6.1 K/UL (ref 3.9–12.7)

## 2025-07-07 PROCEDURE — 85025 COMPLETE CBC W/AUTO DIFF WBC: CPT | Mod: PN

## 2025-07-07 PROCEDURE — 36415 COLL VENOUS BLD VENIPUNCTURE: CPT | Mod: PN

## 2025-07-10 DIAGNOSIS — R23.2 HOT FLASHES: ICD-10-CM

## 2025-07-10 RX ORDER — ESTRADIOL 0.5 MG/1
0.5 TABLET ORAL
Qty: 90 TABLET | Refills: 3 | Status: SHIPPED | OUTPATIENT
Start: 2025-07-10

## 2025-07-10 NOTE — TELEPHONE ENCOUNTER
No care due was identified.  Health [facility] Embedded Care Due Messages. Reference number: [id number].   7/10/2025 12:15:05 AM CDT

## 2025-07-11 PROBLEM — R68.89 IMPAIRED TOLERANCE OF ACTIVITY: Status: ACTIVE | Noted: 2025-07-11

## 2025-07-11 PROBLEM — R29.898 IMPAIRED STRENGTH OF LOWER EXTREMITY: Status: ACTIVE | Noted: 2025-07-11

## 2025-07-11 PROBLEM — R26.89 IMPAIRED GAIT AND MOBILITY: Status: ACTIVE | Noted: 2025-07-11

## 2025-07-11 PROBLEM — M25.661 IMPAIRED RANGE OF MOTION OF RIGHT KNEE: Status: ACTIVE | Noted: 2025-07-11

## 2025-07-14 ENCOUNTER — PATIENT MESSAGE (OUTPATIENT)
Dept: PRIMARY CARE CLINIC | Facility: CLINIC | Age: 45
End: 2025-07-14
Payer: COMMERCIAL

## 2025-07-22 DIAGNOSIS — G62.9 NEUROPATHY: ICD-10-CM

## 2025-07-22 RX ORDER — PREGABALIN 100 MG/1
200 CAPSULE ORAL DAILY
Qty: 180 CAPSULE | Refills: 3 | Status: SHIPPED | OUTPATIENT
Start: 2025-07-22

## 2025-07-22 NOTE — TELEPHONE ENCOUNTER
No care due was identified.  [facility] Embedded Care Due Messages. Reference number: [id number].   7/22/2025 8:40:19 AM CDT

## 2025-08-01 ENCOUNTER — OFFICE VISIT (OUTPATIENT)
Dept: PRIMARY CARE CLINIC | Facility: CLINIC | Age: 45
End: 2025-08-01
Payer: COMMERCIAL

## 2025-08-01 ENCOUNTER — PATIENT MESSAGE (OUTPATIENT)
Dept: PRIMARY CARE CLINIC | Facility: CLINIC | Age: 45
End: 2025-08-01

## 2025-08-01 DIAGNOSIS — F90.9 ATTENTION DEFICIT HYPERACTIVITY DISORDER (ADHD), UNSPECIFIED ADHD TYPE: ICD-10-CM

## 2025-08-01 DIAGNOSIS — E66.9 OBESITY, UNSPECIFIED CLASS, UNSPECIFIED OBESITY TYPE, UNSPECIFIED WHETHER SERIOUS COMORBIDITY PRESENT: Primary | ICD-10-CM

## 2025-08-01 RX ORDER — LISDEXAMFETAMINE DIMESYLATE 20 MG/1
20 CAPSULE ORAL DAILY
Qty: 30 CAPSULE | Refills: 0 | Status: SHIPPED | OUTPATIENT
Start: 2025-09-01 | End: 2025-10-01

## 2025-08-01 RX ORDER — PHENTERMINE HYDROCHLORIDE 37.5 MG/1
37.5 TABLET ORAL
Qty: 30 TABLET | Refills: 0 | Status: SHIPPED | OUTPATIENT
Start: 2025-08-01 | End: 2025-08-31

## 2025-08-01 RX ORDER — LISDEXAMFETAMINE DIMESYLATE 20 MG/1
20 CAPSULE ORAL DAILY
Qty: 30 CAPSULE | Refills: 0 | Status: SHIPPED | OUTPATIENT
Start: 2025-10-02 | End: 2025-11-01

## 2025-08-01 RX ORDER — LISDEXAMFETAMINE DIMESYLATE 20 MG/1
20 CAPSULE ORAL DAILY
Qty: 30 CAPSULE | Refills: 0 | Status: SHIPPED | OUTPATIENT
Start: 2025-08-01 | End: 2025-08-31

## 2025-08-01 NOTE — PROGRESS NOTES
The patient location is: [place]  The chief complaint leading to consultation is: weight    Visit type: audiovisual    Face to Face time with patient: 10 minutes  10 minutes of total time spent on the encounter, which includes face to face time and non-face to face time preparing to see the patient (eg, review of tests), Obtaining and/or reviewing separately obtained history, Documenting clinical information in the electronic or other health record, Independently interpreting results (not separately reported) and communicating results to the patient/family/caregiver, or Care coordination (not separately reported).         Each patient to whom he or she provides medical services by telemedicine is:  (1) informed of the relationship between the physician and patient and the respective role of any other health care provider with respect to management of the patient; and (2) notified that he or she may decline to receive medical services by telemedicine and may withdraw from such care at any time.    [facility] Note    Chief Complaint      Chief Complaint   Patient presents with    Obesity     History of Present Illness      Lesa Leyva is a 45 y.o. female who presents today for obesity..  Patient comes to appointment alone.    Lost 20 pounds over the last 2 years. Now stalled. Interested in phentermine, mom is taking with good results.    Problem List Items Addressed This Visit       ADHD    Current Assessment & Plan   Stable on vyvanse 20 mg daily. Testing done with psychiatry in 2021.     Adderall XR 10 gave her HA.          Other Visit Diagnoses         Obesity, unspecified class, unspecified obesity type, unspecified whether serious comorbidity present    -  Primary                Health Maintenance   Topic Date Due    COVID-19 Vaccine (3 - 2024-25 season) 09/01/2024    Influenza Vaccine (1) 09/01/2025    Mammogram  05/26/2026    Hemoglobin A1c (Diabetic Prevention Screening)  05/06/2028     TETANUS VACCINE  01/25/2029    RSV Vaccine (Age 60+ and Pregnant patients) (1 - 1-dose 75+ series) 02/09/2057    Hepatitis C Screening  Completed    HIV Screening  Completed    Lipid Panel  Completed    Pneumococcal Vaccines (Age 0-49)  Aged Out       Past Medical History:   Diagnosis Date    ADHD     Anxiety     Chronic left-sided low back pain without sciatica     Cystitis     Depression     H/O: hysterectomy 05/29/2019    Hydronephrosis     Insomnia     Kidney stones     Nephrolithiasis 11/20/2018    Neuropathy of both feet     Neuropathy of hand     both hands    Painful bladder spasm     Primary osteoarthritis of left knee     tka    Pyelonephritis     Ureteral stricture, left     Urinary tract infection        Past Surgical History:   Procedure Laterality Date    BLADDER FULGURATION N/A 05/31/2018    Procedure: FULGURATION-BLADDER;  Surgeon: Angel Hernandez MD;  Location: [facility];  Service: Urology;  Laterality: N/A;    BLADDER SUSPENSION  2013    bladder lift    CYSTOSCOPY W/ URETERAL STENT PLACEMENT Left 03/2015    CYSTOSCOPY W/ URETERAL STENT REMOVAL Left 05/31/2018    Procedure: CYSTOSCOPY WITH STENT REMOVAL;  Surgeon: Angel Hernandez MD;  Location: [facility];  Service: Urology;  Laterality: Left;    EXTRACORPOREAL SHOCK WAVE LITHOTRIPSY Right 09/06/2018    Procedure: LITHOTRIPSY, ESWL - Right distal ureteral calculus;  Surgeon: Angel Hernandez MD;  Location: [facility] OR;  Service: Urology;  Laterality: Right;    EXTRACORPOREAL SHOCK WAVE LITHOTRIPSY Right 01/16/2020    Procedure: LITHOTRIPSY, ESWL;  Surgeon: Angel Hernandez MD;  Location: [facility] OR;  Service: Urology;  Laterality: Right;    EXTRACORPOREAL SHOCK WAVE LITHOTRIPSY Left 12/08/2022    Procedure: LITHOTRIPSY, ESWL;  Surgeon: Angel Hernandez MD;  Location: [facility] OR;  Service: Urology;  Laterality: Left;  ESWL on left renal calculs    EXTRACORPOREAL SHOCK WAVE LITHOTRIPSY Left 02/06/2025    Procedure: LITHOTRIPSY, ESWL;  Surgeon: Angel Hernandez  MD;  Location: [facility] OR;  Service: Urology;  Laterality: Left;    HYSTERECTOMY      Partial - still has ovaries    KNEE SURGERY Left     multiple arthroscopies with 1 meniscus repair    lithotripsy  04/2012    OVARIAN CYST SURGERY Right 2006    RETROGRADE PYELOGRAPHY Left 05/31/2018    Procedure: PYELOGRAM, RETROGRADE;  Surgeon: Angel Hernandez MD;  Location: [facility] OR;  Service: Urology;  Laterality: Left;    TOTAL KNEE ARTHROPLASTY Left 05/24/2021    Procedure: ARTHROPLASTY, KNEE, TOTAL monoblock;  Surgeon: Ralf Wilkinson MD;  Location: [facility] OR;  Service: Orthopedics;  Laterality: Left;  Odilon tavera/ Braydon confirmed 5/21/21 [place]       family history includes Arthritis in her mother; Breast cancer (age of onset: 45) in her maternal grandmother; COPD in her maternal grandfather; Cancer in her mother; Depression in her mother; Fibromyalgia in her mother; Hypertension in her father and mother; No Known Problems in her sister, sister, and sister; Seizures in her brother, brother, and mother.    Social History     Tobacco Use    Smoking status: Never    Smokeless tobacco: Never   Substance Use Topics    Alcohol use: Yes     Comment: once every couple of months    Drug use: Never       Review of Systems   Constitutional:  Negative for chills and fever.   Respiratory:  Negative for cough and shortness of breath.    Cardiovascular:  Negative for chest pain and palpitations.   Gastrointestinal:  Negative for constipation, diarrhea, nausea and vomiting.   Genitourinary:  Negative for dysuria and hematuria.   Musculoskeletal:  Negative for falls.   Neurological:  Negative for headaches.        Outpatient Encounter Medications as of 8/1/2025   Medication Sig Dispense Refill    ALPRAZolam (XANAX) 0.25 MG tablet Take 1 tablet (0.25 mg total) by mouth nightly as needed for Anxiety. 30 tablet 5    celecoxib (CELEBREX) 200 MG capsule Take 1 capsule (200 mg total) by mouth 2 (two) times daily. 60 capsule 1    cyclobenzaprine (FLEXERIL) 10 MG  tablet TAKE 1 TABLET BY MOUTH EVERY DAY NIGHTLY AS NEEDED FOR MUSCLE SPASMS 90 tablet 1    DULoxetine (CYMBALTA) 30 MG capsule TAKE 1 CAPSULE BY MOUTH EVERY DAY 90 capsule 3    DULoxetine (CYMBALTA) 60 MG capsule Take 1 capsule (60 mg total) by mouth every evening. 90 capsule 3    estradioL (ESTRACE) 0.5 MG tablet TAKE 1 TABLET BY MOUTH ONCE DAILY. 90 tablet 3    LIDOcaine (LIDODERM) 5 % Place 1 patch onto the skin once daily. Remove & Discard patch within 12 hours or as directed by MD (Patient taking differently: Place 1 patch onto the skin once daily. Not using) 5 patch 0    lisdexamfetamine (VYVANSE) 20 MG capsule Take 1 capsule (20 mg total) by mouth every morning. 30 capsule 0    ondansetron (ZOFRAN-ODT) 4 MG TbDL Take 1 tablet (4 mg total) by mouth every 6 (six) hours as needed (nausea). 10 tablet 0    pregabalin (LYRICA) 100 MG capsule Take 2 capsules (200 mg total) by mouth once daily. 180 capsule 3    tamsulosin (FLOMAX) 0.4 mg Cap TAKE 1 CAPSULE BY MOUTH EVERY DAY 90 capsule 3    traZODone (DESYREL) 100 MG tablet TAKE 1 TABLET BY MOUTH EVERY DAY IN THE EVENING 90 tablet 3    ubrogepant (UBRELVY) 100 mg tablet Take 1 tablet (100 mg total) by mouth as needed for Migraine. If symptoms persist or return, may repeat dose after 2 hours. Maximum: 200 mg per 24 hours 10 tablet 11     No facility-administered encounter medications on file as of 8/1/2025.        Review of patient's allergies indicates:   Allergen Reactions    Peaches [peach (prunus persica)] Hives, Itching and Swelling    Tramadol Other (See Comments)     headache       Physical Exam       ]    Physical Exam  Constitutional:       General: She is not in acute distress.     Appearance: She is well-developed.   HENT:      Head: Normocephalic and atraumatic.   Pulmonary:      Effort: Pulmonary effort is normal.   Musculoskeletal:      Cervical back: Normal range of motion.   Skin:     Findings: No rash.   Neurological:      Mental Status: She is alert  "and oriented to person, place, and time.      Coordination: Coordination normal.   Psychiatric:         Behavior: Behavior normal.         Thought Content: Thought content normal.         Judgment: Judgment normal.          Laboratory:  CBC:  Recent Labs   Lab Result Units 05/06/25  0802 07/07/25  0803   WBC K/uL 4.53 6.10   RBC M/uL 4.75 4.81   HGB gm/dL 14.2 14.4   HCT % 42.7 42.8   Platelet Count K/uL 279 254   MCV fL 90 89   MCH pg 29.9 29.9   MCHC g/dL 33.3 33.6       CMP:  Recent Labs   Lab Result Units 05/06/25  0802   Glucose mg/dL 99   Calcium mg/dL 9.5   Albumin g/dL 4.5   Protein Total gm/dL 7.6   Sodium mmol/L 140   Potassium mmol/L 4.3   CO2 mmol/L 29   Chloride mmol/L 102   BUN mg/dL 18*   ALP unit/L 92   ALT unit/L 26   AST unit/L 29   Bilirubin Total mg/dL 0.3       URINALYSIS:  No results for input(s): "COLORU", "CLARITYU", "SPECGRAV", "PHUR", "PROTEINUA", "GLUCOSEU", "BILIRUBINCON", "BLOODU", "WBCU", "RBCU", "BACTERIA", "MUCUS", "NITRITE", "LEUKOCYTESUR", "UROBILINOGEN", "HYALINECASTS" in the last 2160 hours.   LIPIDS:  Recent Labs   Lab Result Units 05/06/25  0802   HDL Cholesterol mg/dL 61   Cholesterol Total mg/dL 233*   Triglyceride mg/dL 58   LDL Cholesterol mg/dL 160.4*   HDL/Cholesterol Ratio % 26.2   Non HDL Cholesterol mg/dL 172   Cholesterol/HDL Ratio  3.8       TSH:  No results for input(s): "TSH" in the last 2160 hours.    A1C:  Recent Labs   Lab Result Units 05/06/25  0802   Hemoglobin A1c % 5.5            Assessment/Plan     Lesa Leyva is a 45 y.o.female with:    1. Obesity, unspecified class, unspecified obesity type, unspecified whether serious comorbidity present    2. Attention deficit hyperactivity disorder (ADHD), unspecified ADHD type      -try phentermine, counseled on SE's especially since taking vyvanse.  - reviewed, no signs of abuse  -Continue current medications and maintain follow up with specialists.    -Follow up in about 3 months (around 11/1/2025) for " Virtual Visit.       Carmen Ugalde MD  [facility]    Answers submitted by the patient for this visit:  Review of Systems Questionnaire (Submitted on 7/26/2025)  activity change: No  unexpected weight change: No  rhinorrhea: No  trouble swallowing: No  visual disturbance: No  chest tightness: No  polyuria: No  difficulty urinating: No  menstrual problem: No  joint swelling: No  arthralgias: No  confusion: No  dysphoric mood: No

## 2025-08-21 ENCOUNTER — PATIENT MESSAGE (OUTPATIENT)
Dept: PRIMARY CARE CLINIC | Facility: CLINIC | Age: 45
End: 2025-08-21
Payer: COMMERCIAL

## 2025-09-02 DIAGNOSIS — E66.9 OBESITY, UNSPECIFIED CLASS, UNSPECIFIED OBESITY TYPE, UNSPECIFIED WHETHER SERIOUS COMORBIDITY PRESENT: ICD-10-CM

## 2025-09-02 DIAGNOSIS — F90.9 ATTENTION DEFICIT HYPERACTIVITY DISORDER (ADHD), UNSPECIFIED ADHD TYPE: ICD-10-CM

## 2025-09-02 RX ORDER — PHENTERMINE HYDROCHLORIDE 37.5 MG/1
37.5 TABLET ORAL
Qty: 30 TABLET | Refills: 0 | Status: SHIPPED | OUTPATIENT
Start: 2025-09-02 | End: 2025-10-02

## 2025-09-02 RX ORDER — LISDEXAMFETAMINE DIMESYLATE 20 MG/1
20 CAPSULE ORAL DAILY
Qty: 30 CAPSULE | Refills: 0 | Status: SHIPPED | OUTPATIENT
Start: 2025-09-02 | End: 2025-10-02

## (undated) DEVICE — SCRUB 10% POVIDONE IODINE 4OZ

## (undated) DEVICE — Device

## (undated) DEVICE — TOURNIQUET SB QC DP 34X4IN

## (undated) DEVICE — SYR 10CC LUER LOCK

## (undated) DEVICE — SUT VICRYL 1 OB 36 CTX

## (undated) DEVICE — ALCOHOL 70% ISOP W/GREEN 16OZ

## (undated) DEVICE — CARD UNIV KNEE NAVGTN SW-SCL L

## (undated) DEVICE — PAD PREP 50/CA

## (undated) DEVICE — NDL 18GA X1 1/2 REG BEVEL

## (undated) DEVICE — HOOD T-5 TEAR AWAY STERILE

## (undated) DEVICE — ADHESIVE DERMABOND ADVANCED

## (undated) DEVICE — BATTERY INSTRUMENT

## (undated) DEVICE — GLOVE BIOGEL PI MICRO INDIC 8

## (undated) DEVICE — INTERPULSE SET

## (undated) DEVICE — NDL 22GA X1 1/2 REG BEVEL

## (undated) DEVICE — DRESSING AQUACEL FOAM 4 X 12

## (undated) DEVICE — CLOSURE SKIN STERI STRIP 1/2X4

## (undated) DEVICE — NDL FILTER MICRON 18G 1 1/2

## (undated) DEVICE — CONTAINER SPECIMEN STR 3 0Z

## (undated) DEVICE — SEE MEDLINE ITEM 153151

## (undated) DEVICE — ADAPTER DUAL IRRIGATION

## (undated) DEVICE — COVER OVERHEAD SURG LT BLUE

## (undated) DEVICE — DRAPE INCISE IOBAN 2 23X23IN

## (undated) DEVICE — PIN PINABALL HEADLESS
Type: IMPLANTABLE DEVICE | Site: KNEE | Status: NON-FUNCTIONAL
Removed: 2021-05-24

## (undated) DEVICE — DRESSING COVER AQUACEL AG SURG

## (undated) DEVICE — GAUZE SPONGE 4X4 12PLY

## (undated) DEVICE — SOL IRR NACL .9% 3000ML

## (undated) DEVICE — SUT CTD VICRYL 2.0

## (undated) DEVICE — COVER BACK TABLE 72X21

## (undated) DEVICE — BLADE 90X13X1.27MM

## (undated) DEVICE — SEE MEDLINE ITEM 146231

## (undated) DEVICE — GLOVE BIOGEL ORTHOPEDIC 7.5

## (undated) DEVICE — DRAPE TIBURON ORTHOPEDIC SPLIT

## (undated) DEVICE — SYR ONLY LUER LOCK 20CC

## (undated) DEVICE — SUT STRATAFIX PDS 1 CTX 18IN

## (undated) DEVICE — DRAPE CASSETTE 20 X 40

## (undated) DEVICE — MANIFOLD 4 PORT

## (undated) DEVICE — ELECTRODE REM PLYHSV RETURN 9

## (undated) DEVICE — DURAPREP SURG SCRUB 26ML